# Patient Record
Sex: FEMALE | Race: WHITE | NOT HISPANIC OR LATINO | Employment: FULL TIME | URBAN - METROPOLITAN AREA
[De-identification: names, ages, dates, MRNs, and addresses within clinical notes are randomized per-mention and may not be internally consistent; named-entity substitution may affect disease eponyms.]

---

## 2017-05-12 ENCOUNTER — LAB (OUTPATIENT)
Dept: LAB | Facility: HOSPITAL | Age: 52
End: 2017-05-12

## 2017-05-12 ENCOUNTER — TELEPHONE (OUTPATIENT)
Dept: INTERNAL MEDICINE | Facility: CLINIC | Age: 52
End: 2017-05-12

## 2017-05-12 ENCOUNTER — OFFICE VISIT (OUTPATIENT)
Dept: INTERNAL MEDICINE | Facility: CLINIC | Age: 52
End: 2017-05-12

## 2017-05-12 VITALS
HEART RATE: 71 BPM | DIASTOLIC BLOOD PRESSURE: 82 MMHG | SYSTOLIC BLOOD PRESSURE: 114 MMHG | WEIGHT: 203 LBS | OXYGEN SATURATION: 98 % | TEMPERATURE: 98.1 F

## 2017-05-12 DIAGNOSIS — K52.9 GASTROENTERITIS: ICD-10-CM

## 2017-05-12 DIAGNOSIS — K62.5 RECTAL BLEEDING: ICD-10-CM

## 2017-05-12 DIAGNOSIS — K62.5 RECTAL BLEEDING: Primary | ICD-10-CM

## 2017-05-12 LAB
ALBUMIN SERPL-MCNC: 4.1 G/DL (ref 3.5–5.2)
ALBUMIN/GLOB SERPL: 1.2 G/DL
ALP SERPL-CCNC: 66 U/L (ref 39–117)
ALT SERPL W P-5'-P-CCNC: 35 U/L (ref 1–33)
ANION GAP SERPL CALCULATED.3IONS-SCNC: 12.6 MMOL/L
AST SERPL-CCNC: 34 U/L (ref 1–32)
BASOPHILS # BLD AUTO: 0.02 10*3/MM3 (ref 0–0.2)
BASOPHILS NFR BLD AUTO: 0.3 % (ref 0–1.5)
BILIRUB SERPL-MCNC: 0.3 MG/DL (ref 0.1–1.2)
BILIRUB UR QL STRIP: NEGATIVE
BUN BLD-MCNC: 18 MG/DL (ref 6–20)
BUN/CREAT SERPL: 26.1 (ref 7–25)
CALCIUM SPEC-SCNC: 9.2 MG/DL (ref 8.6–10.5)
CHLORIDE SERPL-SCNC: 105 MMOL/L (ref 98–107)
CLARITY UR: ABNORMAL
CO2 SERPL-SCNC: 25.4 MMOL/L (ref 22–29)
COLOR UR: YELLOW
CREAT BLD-MCNC: 0.69 MG/DL (ref 0.57–1)
DEPRECATED RDW RBC AUTO: 43.1 FL (ref 37–54)
EOSINOPHIL # BLD AUTO: 0.11 10*3/MM3 (ref 0–0.7)
EOSINOPHIL NFR BLD AUTO: 1.9 % (ref 0.3–6.2)
ERYTHROCYTE [DISTWIDTH] IN BLOOD BY AUTOMATED COUNT: 12.6 % (ref 11.7–13)
GFR SERPL CREATININE-BSD FRML MDRD: 89 ML/MIN/1.73
GLOBULIN UR ELPH-MCNC: 3.3 GM/DL
GLUCOSE BLD-MCNC: 97 MG/DL (ref 65–99)
GLUCOSE UR STRIP-MCNC: NEGATIVE MG/DL
HCT VFR BLD AUTO: 42.4 % (ref 35.6–45.5)
HEMOCCULT STL QL IA: POSITIVE
HGB BLD-MCNC: 14.5 G/DL (ref 11.9–15.5)
HGB UR QL STRIP.AUTO: NEGATIVE
IMM GRANULOCYTES # BLD: 0 10*3/MM3 (ref 0–0.03)
IMM GRANULOCYTES NFR BLD: 0 % (ref 0–0.5)
KETONES UR QL STRIP: NEGATIVE
LEUKOCYTE ESTERASE UR QL STRIP.AUTO: NEGATIVE
LYMPHOCYTES # BLD AUTO: 1.88 10*3/MM3 (ref 0.9–4.8)
LYMPHOCYTES NFR BLD AUTO: 32 % (ref 19.6–45.3)
MCH RBC QN AUTO: 32.2 PG (ref 26.9–32)
MCHC RBC AUTO-ENTMCNC: 34.2 G/DL (ref 32.4–36.3)
MCV RBC AUTO: 94.2 FL (ref 80.5–98.2)
MONOCYTES # BLD AUTO: 0.53 10*3/MM3 (ref 0.2–1.2)
MONOCYTES NFR BLD AUTO: 9 % (ref 5–12)
NEUTROPHILS # BLD AUTO: 3.33 10*3/MM3 (ref 1.9–8.1)
NEUTROPHILS NFR BLD AUTO: 56.8 % (ref 42.7–76)
NITRITE UR QL STRIP: NEGATIVE
PH UR STRIP.AUTO: 6 [PH] (ref 5–8)
PLATELET # BLD AUTO: 265 10*3/MM3 (ref 140–500)
PMV BLD AUTO: 10.7 FL (ref 6–12)
POTASSIUM BLD-SCNC: 4.3 MMOL/L (ref 3.5–5.2)
PROT SERPL-MCNC: 7.4 G/DL (ref 6–8.5)
PROT UR QL STRIP: NEGATIVE
RBC # BLD AUTO: 4.5 10*6/MM3 (ref 3.9–5.2)
SODIUM BLD-SCNC: 143 MMOL/L (ref 136–145)
SP GR UR STRIP: 1.02 (ref 1–1.03)
UROBILINOGEN UR QL STRIP: ABNORMAL
WBC NRBC COR # BLD: 5.87 10*3/MM3 (ref 4.5–10.7)

## 2017-05-12 PROCEDURE — 36415 COLL VENOUS BLD VENIPUNCTURE: CPT

## 2017-05-12 PROCEDURE — 99213 OFFICE O/P EST LOW 20 MIN: CPT | Performed by: NURSE PRACTITIONER

## 2017-05-12 PROCEDURE — 85025 COMPLETE CBC W/AUTO DIFF WBC: CPT

## 2017-05-12 PROCEDURE — 80053 COMPREHEN METABOLIC PANEL: CPT

## 2017-05-12 PROCEDURE — 81003 URINALYSIS AUTO W/O SCOPE: CPT

## 2017-05-12 PROCEDURE — 82274 ASSAY TEST FOR BLOOD FECAL: CPT | Performed by: NURSE PRACTITIONER

## 2017-05-12 RX ORDER — CEFDINIR 300 MG/1
300 CAPSULE ORAL 2 TIMES DAILY
Qty: 14 CAPSULE | Refills: 0 | Status: SHIPPED | OUTPATIENT
Start: 2017-05-12 | End: 2017-05-19

## 2017-05-12 RX ORDER — METRONIDAZOLE 500 MG/1
500 TABLET ORAL 2 TIMES DAILY
Qty: 14 TABLET | Refills: 0 | Status: SHIPPED | OUTPATIENT
Start: 2017-05-12 | End: 2017-05-19

## 2017-05-12 RX ORDER — ESTRADIOL 0.5 MG/1
0.5 TABLET ORAL DAILY
COMMUNITY
End: 2018-11-14 | Stop reason: SDUPTHER

## 2017-05-15 ENCOUNTER — OFFICE VISIT (OUTPATIENT)
Dept: INTERNAL MEDICINE | Facility: CLINIC | Age: 52
End: 2017-05-15

## 2017-05-15 VITALS — SYSTOLIC BLOOD PRESSURE: 114 MMHG | TEMPERATURE: 98.1 F | DIASTOLIC BLOOD PRESSURE: 82 MMHG | WEIGHT: 203 LBS

## 2017-05-15 DIAGNOSIS — R10.32 LEFT LOWER QUADRANT PAIN: ICD-10-CM

## 2017-05-15 DIAGNOSIS — K62.5 RECTAL BLEEDING: Primary | ICD-10-CM

## 2017-05-15 DIAGNOSIS — K52.9 GASTROENTERITIS: ICD-10-CM

## 2017-05-15 DIAGNOSIS — R11.0 NAUSEA: ICD-10-CM

## 2017-05-15 LAB — HEMOCCULT STL QL IA: POSITIVE

## 2017-05-15 PROCEDURE — 99213 OFFICE O/P EST LOW 20 MIN: CPT | Performed by: NURSE PRACTITIONER

## 2017-05-15 PROCEDURE — 82274 ASSAY TEST FOR BLOOD FECAL: CPT | Performed by: NURSE PRACTITIONER

## 2017-05-15 RX ORDER — ONDANSETRON 4 MG/1
4 TABLET, FILM COATED ORAL EVERY 8 HOURS PRN
Qty: 15 TABLET | Refills: 0 | Status: SHIPPED | OUTPATIENT
Start: 2017-05-15 | End: 2018-09-19

## 2017-09-19 ENCOUNTER — OFFICE VISIT (OUTPATIENT)
Dept: INTERNAL MEDICINE | Facility: CLINIC | Age: 52
End: 2017-09-19

## 2017-09-19 VITALS
HEIGHT: 65 IN | BODY MASS INDEX: 33.66 KG/M2 | WEIGHT: 202 LBS | SYSTOLIC BLOOD PRESSURE: 128 MMHG | DIASTOLIC BLOOD PRESSURE: 80 MMHG

## 2017-09-19 DIAGNOSIS — Z76.89 SLEEP CONCERN: ICD-10-CM

## 2017-09-19 DIAGNOSIS — G40.802 OTHER EPILEPSY WITHOUT STATUS EPILEPTICUS, NOT INTRACTABLE (HCC): Primary | ICD-10-CM

## 2017-09-19 LAB
ALBUMIN SERPL-MCNC: 4.7 G/DL (ref 3.5–5.2)
ALBUMIN/GLOB SERPL: 2 G/DL
ALP SERPL-CCNC: 63 U/L (ref 39–117)
ALT SERPL W P-5'-P-CCNC: 46 U/L (ref 1–33)
ANION GAP SERPL CALCULATED.3IONS-SCNC: 13.1 MMOL/L
AST SERPL-CCNC: 39 U/L (ref 1–32)
BASOPHILS # BLD AUTO: 0.03 10*3/MM3 (ref 0–0.2)
BASOPHILS NFR BLD AUTO: 0.4 % (ref 0–2)
BILIRUB SERPL-MCNC: 0.4 MG/DL (ref 0.1–1.2)
BUN BLD-MCNC: 18 MG/DL (ref 6–20)
BUN/CREAT SERPL: 24.7 (ref 7–25)
CALCIUM SPEC-SCNC: 9.5 MG/DL (ref 8.6–10.5)
CHLORIDE SERPL-SCNC: 102 MMOL/L (ref 98–107)
CO2 SERPL-SCNC: 24.9 MMOL/L (ref 22–29)
CREAT BLD-MCNC: 0.73 MG/DL (ref 0.57–1)
DEPRECATED RDW RBC AUTO: 40.1 FL (ref 37–54)
EOSINOPHIL # BLD AUTO: 0.12 10*3/MM3 (ref 0–0.7)
EOSINOPHIL NFR BLD AUTO: 1.8 % (ref 0–5)
ERYTHROCYTE [DISTWIDTH] IN BLOOD BY AUTOMATED COUNT: 12.1 % (ref 11.5–15)
GFR SERPL CREATININE-BSD FRML MDRD: 83 ML/MIN/1.73
GLOBULIN UR ELPH-MCNC: 2.4 GM/DL
GLUCOSE BLD-MCNC: 104 MG/DL (ref 65–99)
HCT VFR BLD AUTO: 42.7 % (ref 34.1–44.9)
HGB BLD-MCNC: 14.7 G/DL (ref 11.2–15.7)
LYMPHOCYTES # BLD AUTO: 1.85 10*3/MM3 (ref 0.8–7)
LYMPHOCYTES NFR BLD AUTO: 27.5 % (ref 10–60)
MCH RBC QN AUTO: 31.7 PG (ref 26–34)
MCHC RBC AUTO-ENTMCNC: 34.4 G/DL (ref 31–37)
MCV RBC AUTO: 92.2 FL (ref 80–100)
MONOCYTES # BLD AUTO: 0.75 10*3/MM3 (ref 0–1)
MONOCYTES NFR BLD AUTO: 11.1 % (ref 0–13)
NEUTROPHILS # BLD AUTO: 3.98 10*3/MM3 (ref 1–11)
NEUTROPHILS NFR BLD AUTO: 59.2 % (ref 30–85)
PLATELET # BLD AUTO: 244 10*3/MM3 (ref 150–450)
PMV BLD AUTO: 10.3 FL (ref 6–12)
POTASSIUM BLD-SCNC: 4.1 MMOL/L (ref 3.5–5.2)
PROT SERPL-MCNC: 7.1 G/DL (ref 6–8.5)
RBC # BLD AUTO: 4.63 10*6/MM3 (ref 3.93–5.22)
SODIUM BLD-SCNC: 140 MMOL/L (ref 136–145)
TSH SERPL DL<=0.05 MIU/L-ACNC: 3.02 MIU/ML (ref 0.27–4.2)
WBC NRBC COR # BLD: 6.73 10*3/MM3 (ref 5–10)

## 2017-09-19 PROCEDURE — 36415 COLL VENOUS BLD VENIPUNCTURE: CPT | Performed by: INTERNAL MEDICINE

## 2017-09-19 PROCEDURE — 84443 ASSAY THYROID STIM HORMONE: CPT | Performed by: INTERNAL MEDICINE

## 2017-09-19 PROCEDURE — 80053 COMPREHEN METABOLIC PANEL: CPT | Performed by: INTERNAL MEDICINE

## 2017-09-19 PROCEDURE — 85025 COMPLETE CBC W/AUTO DIFF WBC: CPT | Performed by: INTERNAL MEDICINE

## 2017-09-19 PROCEDURE — 99214 OFFICE O/P EST MOD 30 MIN: CPT | Performed by: INTERNAL MEDICINE

## 2017-09-19 RX ORDER — GABAPENTIN 300 MG/1
300 CAPSULE ORAL 2 TIMES DAILY
Qty: 60 CAPSULE | Refills: 5 | Status: SHIPPED | OUTPATIENT
Start: 2017-09-19 | End: 2018-03-19 | Stop reason: SDUPTHER

## 2017-09-19 NOTE — PROGRESS NOTES
Chief Complaint   Patient presents with   • Seizures     been having episodes of falls and seizures while sleeping, some confusion        Subjective   Imani Mazariegos is a 52 y.o. female     HPI: She comes in for evaluation of seizures, episodes of falling.    In 1999, she had an episode of garbled speech.  Her evaluation at that time raised the possibility of a parietal lobe seizure.  She was treated with Lyrica then gabapentin.  She stopped taking gabapentin about 3 years ago.    In the last couple of years, she has had episodes of falling down.  After the event in 1999, she says that her right leg just has not been the same as her left.  Sometimes it drags.  She thinks that this is why she falls.  One fall recently caused her to injure her right shoulder.  She says that she cracked her clavicle.  She states that she can fall just walking across the room.  While she is asleep, she has body jerks.  She says it feels like she's having a seizure.  She had 2 episodes last night.  She feels bad today.  At times, she says that she has periods of confusion.  Sometimes she forgets where she is going while driving.  She has to pull over, inspect her surroundings, look at buildings and she is able to recall where she is and where she is going.  She has not had loss of consciousness at all.  Her symptoms are worse if she is fatigued.  She believes they were controlled while she took gabapentin.        The following portions of the patient's history were reviewed and updated as appropriate: allergies, current medications, past social history, problem list, past surgical history    Review of Systems   Constitutional: Negative for appetite change and fever.   HENT: Positive for trouble swallowing (she sometimes feels as if food sticks in her mid chest.). Negative for sinus pressure and sore throat.    Eyes: Negative for visual disturbance.   Respiratory: Positive for shortness of breath (sometimes, with stairs.). Negative for  "cough and wheezing.    Cardiovascular: Positive for palpitations (at night). Negative for chest pain and leg swelling.   Gastrointestinal: Negative for diarrhea, nausea and vomiting.   Endocrine: Negative for cold intolerance and heat intolerance.   Neurological: Negative for dizziness, syncope and headaches.           Objective     /80  Ht 65\" (165.1 cm)  Wt 202 lb (91.6 kg)  BMI 33.61 kg/m2     Physical Exam   Constitutional: She is oriented to person, place, and time. She appears well-developed and well-nourished. No distress.   Neck: Normal carotid pulses present. Carotid bruit is not present.   Cardiovascular: Normal rate, regular rhythm, S1 normal, S2 normal and intact distal pulses.  Exam reveals no gallop and no friction rub.    No murmur heard.  Pulses:       Carotid pulses are 2+ on the right side, and 2+ on the left side.  Pulmonary/Chest: Effort normal and breath sounds normal. No respiratory distress. She has no wheezes. She has no rhonchi. She has no rales. Chest wall is not dull to percussion.   Musculoskeletal: She exhibits no edema.   Neurological: She is alert and oriented to person, place, and time. She has normal strength. No cranial nerve deficit or sensory deficit. She displays a negative Romberg sign. Coordination normal.   Reflex Scores:       Patellar reflexes are 1+ on the right side and 1+ on the left side.  She finds it difficult to walk on her toes.   Skin: Skin is warm and dry.   Nursing note and vitals reviewed.      Assessment/Plan   Problem List Items Addressed This Visit        Nervous and Auditory    Epilepsy without status epilepticus, not intractable - Primary    Relevant Medications    gabapentin (NEURONTIN) 300 MG capsule    Other Relevant Orders    MRI Brain With & Without Contrast    EEG    Comprehensive Metabolic Panel (Completed)    CBC & Differential (Completed)    TSH (Completed)    CBC Auto Differential (Completed)      Other Visit Diagnoses     Sleep concern    "     Relevant Orders    Ambulatory Referral to Sleep Medicine      She will resume gabapentin.  Controlled substance agreement form signed.

## 2017-10-12 ENCOUNTER — APPOINTMENT (OUTPATIENT)
Dept: WOMENS IMAGING | Facility: HOSPITAL | Age: 52
End: 2017-10-12

## 2017-10-12 PROCEDURE — G0202 SCR MAMMO BI INCL CAD: HCPCS | Performed by: RADIOLOGY

## 2017-10-12 PROCEDURE — 77063 BREAST TOMOSYNTHESIS BI: CPT | Performed by: RADIOLOGY

## 2017-10-12 PROCEDURE — 77067 SCR MAMMO BI INCL CAD: CPT | Performed by: RADIOLOGY

## 2017-10-16 ENCOUNTER — APPOINTMENT (OUTPATIENT)
Dept: MRI IMAGING | Facility: HOSPITAL | Age: 52
End: 2017-10-16
Attending: INTERNAL MEDICINE

## 2017-10-16 ENCOUNTER — APPOINTMENT (OUTPATIENT)
Dept: NEUROLOGY | Facility: HOSPITAL | Age: 52
End: 2017-10-16
Attending: INTERNAL MEDICINE

## 2017-11-03 ENCOUNTER — TELEPHONE (OUTPATIENT)
Dept: INTERNAL MEDICINE | Facility: CLINIC | Age: 52
End: 2017-11-03

## 2017-11-03 NOTE — TELEPHONE ENCOUNTER
----- Message from Rufina Mcnamara MA sent at 11/3/2017 10:03 AM EDT -----  Pt calling for Valium to have for MRI 11/11    [facility] [address] # [phone]

## 2017-11-13 ENCOUNTER — APPOINTMENT (OUTPATIENT)
Dept: MRI IMAGING | Facility: HOSPITAL | Age: 52
End: 2017-11-13
Attending: INTERNAL MEDICINE

## 2017-11-13 ENCOUNTER — APPOINTMENT (OUTPATIENT)
Dept: NEUROLOGY | Facility: HOSPITAL | Age: 52
End: 2017-11-13
Attending: INTERNAL MEDICINE

## 2017-11-29 ENCOUNTER — APPOINTMENT (OUTPATIENT)
Dept: SLEEP MEDICINE | Facility: HOSPITAL | Age: 52
End: 2017-11-29
Attending: INTERNAL MEDICINE

## 2018-03-13 RX ORDER — GABAPENTIN 300 MG/1
CAPSULE ORAL
Qty: 60 CAPSULE | OUTPATIENT
Start: 2018-03-13

## 2018-03-19 ENCOUNTER — OFFICE VISIT (OUTPATIENT)
Dept: INTERNAL MEDICINE | Facility: CLINIC | Age: 53
End: 2018-03-19

## 2018-03-19 VITALS
HEIGHT: 65 IN | SYSTOLIC BLOOD PRESSURE: 116 MMHG | WEIGHT: 191 LBS | DIASTOLIC BLOOD PRESSURE: 64 MMHG | BODY MASS INDEX: 31.82 KG/M2

## 2018-03-19 DIAGNOSIS — E78.5 HYPERLIPIDEMIA, UNSPECIFIED HYPERLIPIDEMIA TYPE: Primary | ICD-10-CM

## 2018-03-19 PROCEDURE — 99212 OFFICE O/P EST SF 10 MIN: CPT | Performed by: INTERNAL MEDICINE

## 2018-03-19 RX ORDER — GABAPENTIN 300 MG/1
300 CAPSULE ORAL 2 TIMES DAILY
Qty: 60 CAPSULE | Refills: 5 | Status: SHIPPED | OUTPATIENT
Start: 2018-03-19 | End: 2018-09-09 | Stop reason: SDUPTHER

## 2018-03-19 RX ORDER — GABAPENTIN 300 MG/1
300 CAPSULE ORAL 2 TIMES DAILY
Qty: 60 CAPSULE | Refills: 5 | Status: SHIPPED | OUTPATIENT
Start: 2018-03-19 | End: 2018-03-19 | Stop reason: SDUPTHER

## 2018-03-19 NOTE — PROGRESS NOTES
"Chief Complaint   Patient presents with   • Follow-up     follow up from sleep study    • Hyperlipidemia       Subjective   Imani Mazariegos is a 52 y.o. female.     She has had sleep study.  No MCKENZIE.  Weight loss recommended. Heartrate dropped during the test.  No drop in oxygen sats. Limb movement was noted.       Hyperlipidemia   Pertinent negatives include no chest pain or shortness of breath.    Hyperlipidemia:     Her most recent lipid panel was done on 10/22/2010.    Total cholesterol was 197, Triglycerides 156, HDL 45, .   Current treatment: Dietary modification and exericise.    She is following Weight Watchers. She has lost 13 lbs.              The following portions of the patient's history were reviewed and updated as appropriate: allergies, current medications, past family history, past medical history, past social history, past surgical history and problem list.    Review of Systems   Constitutional: Negative for appetite change.   HENT: Negative for nosebleeds.    Eyes: Negative for blurred vision and double vision.   Respiratory: Negative for cough and shortness of breath.    Cardiovascular: Negative for chest pain, palpitations and leg swelling.   Neurological: Negative for headaches.         Current Outpatient Prescriptions:   •  conjugated estrogens (PREMARIN) 0.625 MG/GM vaginal cream, Insert 1 g into the vagina Daily., Disp: , Rfl:   •  estradiol (ESTRACE) 0.5 MG tablet, Take 0.5 mg by mouth Daily., Disp: , Rfl:   •  gabapentin (NEURONTIN) 300 MG capsule, Take 1 capsule by mouth 2 (Two) Times a Day., Disp: 60 capsule, Rfl: 5  •  ondansetron (ZOFRAN) 4 MG tablet, Take 1 tablet by mouth Every 8 (Eight) Hours As Needed for Nausea or Vomiting., Disp: 15 tablet, Rfl: 0        Objective     Vitals:    03/19/18 1456   BP: 116/64   Weight: 86.6 kg (191 lb)   Height: 165.1 cm (65\")       Physical Exam   Constitutional: She is oriented to person, place, and time. She appears well-developed and " well-nourished. No distress.   Neck: Normal carotid pulses present. Carotid bruit is not present.   Cardiovascular: Regular rhythm, S1 normal and S2 normal.  Exam reveals no gallop and no friction rub.    No murmur heard.  Pulses:       Carotid pulses are 2+ on the right side, and 2+ on the left side.  Pulmonary/Chest: Effort normal and breath sounds normal. She has no wheezes. She has no rhonchi. She has no rales. Chest wall is not dull to percussion.   Musculoskeletal: She exhibits no edema.   Neurological: She is alert and oriented to person, place, and time.   Skin: Skin is warm and dry.         Assessment/Plan   Imani was seen today for follow-up and hyperlipidemia.    Diagnoses and all orders for this visit:    Hyperlipidemia, unspecified hyperlipidemia type    Other orders  -     Discontinue: gabapentin (NEURONTIN) 300 MG capsule; Take 1 capsule by mouth 2 (Two) Times a Day.  -     gabapentin (NEURONTIN) 300 MG capsule; Take 1 capsule by mouth 2 (Two) Times a Day.      She will continue prudent diet, regular exercise.

## 2018-07-30 ENCOUNTER — TELEPHONE (OUTPATIENT)
Dept: NEUROSURGERY | Facility: CLINIC | Age: 53
End: 2018-07-30

## 2018-07-30 NOTE — TELEPHONE ENCOUNTER
Called patient and asked her if she had received her new patient packet and she has. I reminded her that we need that returned within 3 business days of her appointment She verbalized understanding and will be putting it back in the mail to return to us as soon as possible. I did remind her that if we do not receive it we will have to cancel or reschedule her appointment.

## 2018-09-11 RX ORDER — GABAPENTIN 300 MG/1
CAPSULE ORAL
Qty: 60 CAPSULE | Refills: 1 | OUTPATIENT
Start: 2018-09-11 | End: 2018-11-05 | Stop reason: SDUPTHER

## 2018-09-19 ENCOUNTER — OFFICE VISIT (OUTPATIENT)
Dept: INTERNAL MEDICINE | Facility: CLINIC | Age: 53
End: 2018-09-19

## 2018-09-19 VITALS
WEIGHT: 181 LBS | SYSTOLIC BLOOD PRESSURE: 106 MMHG | DIASTOLIC BLOOD PRESSURE: 64 MMHG | HEIGHT: 65 IN | BODY MASS INDEX: 30.16 KG/M2

## 2018-09-19 DIAGNOSIS — Z11.59 SCREENING FOR VIRAL DISEASE: ICD-10-CM

## 2018-09-19 DIAGNOSIS — Z23 NEED FOR INFLUENZA VACCINATION: ICD-10-CM

## 2018-09-19 DIAGNOSIS — E78.5 HYPERLIPIDEMIA, UNSPECIFIED HYPERLIPIDEMIA TYPE: Primary | ICD-10-CM

## 2018-09-19 LAB
CHOLEST SERPL-MCNC: 207 MG/DL (ref 0–200)
HCV AB SER DONR QL: NORMAL
HDLC SERPL-MCNC: 57 MG/DL (ref 40–60)
LDLC SERPL CALC-MCNC: 131 MG/DL (ref 0–100)
LDLC/HDLC SERPL: 2.29 {RATIO}
TRIGL SERPL-MCNC: 97 MG/DL (ref 0–150)
VLDLC SERPL-MCNC: 19.4 MG/DL (ref 5–40)

## 2018-09-19 PROCEDURE — 80061 LIPID PANEL: CPT | Performed by: INTERNAL MEDICINE

## 2018-09-19 PROCEDURE — 99212 OFFICE O/P EST SF 10 MIN: CPT | Performed by: INTERNAL MEDICINE

## 2018-09-19 PROCEDURE — 90674 CCIIV4 VAC NO PRSV 0.5 ML IM: CPT | Performed by: INTERNAL MEDICINE

## 2018-09-19 PROCEDURE — G0008 ADMIN INFLUENZA VIRUS VAC: HCPCS | Performed by: INTERNAL MEDICINE

## 2018-09-19 PROCEDURE — 86803 HEPATITIS C AB TEST: CPT | Performed by: INTERNAL MEDICINE

## 2018-09-19 PROCEDURE — 36415 COLL VENOUS BLD VENIPUNCTURE: CPT | Performed by: INTERNAL MEDICINE

## 2018-09-19 NOTE — PROGRESS NOTES
"Chief Complaint   Patient presents with   • Hyperlipidemia     6 month follow up       Subjective   Imani Mazariegos is a 53 y.o. female.     Hyperlipidemia   This is a chronic problem. She has no history of diabetes or hypothyroidism. Factors aggravating her hyperlipidemia include estrogen. Pertinent negatives include no chest pain, leg pain, myalgias or shortness of breath. Current antihyperlipidemic treatment includes diet change and exercise. There are no compliance problems.     She is following Weight Watchers and has lost a significant amount of weight.    The following portions of the patient's history were reviewed and updated as appropriate: allergies, current medications, past family history, past medical history, past social history, past surgical history and problem list.    Review of Systems   Constitutional: Negative for appetite change.   HENT: Negative for nosebleeds.    Eyes: Negative for blurred vision and double vision.   Respiratory: Negative for cough and shortness of breath.    Cardiovascular: Negative for chest pain, palpitations and leg swelling.   Musculoskeletal: Negative for myalgias.         Current Outpatient Prescriptions:   •  conjugated estrogens (PREMARIN) 0.625 MG/GM vaginal cream, Insert 1 g into the vagina Daily., Disp: , Rfl:   •  estradiol (ESTRACE) 0.5 MG tablet, Take 0.5 mg by mouth Daily., Disp: , Rfl:   •  gabapentin (NEURONTIN) 300 MG capsule, TAKE ONE CAPSULE BY MOUTH TWICE A DAY, Disp: 60 capsule, Rfl: 1        Objective     /64   Ht 165.1 cm (65\")   Wt 82.1 kg (181 lb)   BMI 30.12 kg/m²     Physical Exam   Constitutional: She is oriented to person, place, and time. She appears well-developed and well-nourished. No distress.   Neck: Normal carotid pulses present. Carotid bruit is not present.   Cardiovascular: Regular rhythm, S1 normal and S2 normal.  Exam reveals no gallop and no friction rub.    No murmur heard.  Pulses:       Carotid pulses are 2+ on the right " side, and 2+ on the left side.  Pulmonary/Chest: Effort normal and breath sounds normal. She has no wheezes. She has no rhonchi. She has no rales. Chest wall is not dull to percussion.   Musculoskeletal: She exhibits no edema.   Neurological: She is alert and oriented to person, place, and time.   Skin: Skin is warm and dry.   Nursing note and vitals reviewed.        Assessment/Plan   Imani was seen today for hyperlipidemia.    Diagnoses and all orders for this visit:    Hyperlipidemia, unspecified hyperlipidemia type  -     Lipid Panel; Future  -     Lipid Panel    Need for influenza vaccination  -     Flucelvax Quad=>4Years (PFS)    Screening for viral disease  -     Hepatitis C Antibody; Future  -     Hepatitis C Antibody      She will check with her pharmacy about the new shingles vaccine.  She believes she is up-to-date on tetanus vaccine and will check.

## 2018-10-15 ENCOUNTER — APPOINTMENT (OUTPATIENT)
Dept: WOMENS IMAGING | Facility: HOSPITAL | Age: 53
End: 2018-10-15

## 2018-10-15 PROCEDURE — 77063 BREAST TOMOSYNTHESIS BI: CPT | Performed by: RADIOLOGY

## 2018-10-15 PROCEDURE — 77067 SCR MAMMO BI INCL CAD: CPT | Performed by: RADIOLOGY

## 2018-11-05 RX ORDER — GABAPENTIN 300 MG/1
300 CAPSULE ORAL 2 TIMES DAILY
Qty: 60 CAPSULE | Refills: 3 | OUTPATIENT
Start: 2018-11-05 | End: 2018-11-09 | Stop reason: SDUPTHER

## 2018-11-09 RX ORDER — GABAPENTIN 300 MG/1
CAPSULE ORAL
Qty: 60 CAPSULE | Refills: 1 | OUTPATIENT
Start: 2018-11-09 | End: 2019-01-02 | Stop reason: SDUPTHER

## 2018-11-12 RX ORDER — GABAPENTIN 300 MG/1
CAPSULE ORAL
Qty: 60 CAPSULE | Refills: 1 | OUTPATIENT
Start: 2018-11-12

## 2018-11-15 RX ORDER — ESTRADIOL 0.5 MG/1
TABLET ORAL
Qty: 30 TABLET | Refills: 0 | Status: SHIPPED | OUTPATIENT
Start: 2018-11-15 | End: 2019-01-03 | Stop reason: SDUPTHER

## 2019-01-02 RX ORDER — ESTRADIOL 0.5 MG/1
TABLET ORAL
Qty: 30 TABLET | Refills: 0 | Status: CANCELLED | OUTPATIENT
Start: 2019-01-02

## 2019-01-03 ENCOUNTER — TELEPHONE (OUTPATIENT)
Dept: INTERNAL MEDICINE | Facility: CLINIC | Age: 54
End: 2019-01-03

## 2019-01-03 RX ORDER — GABAPENTIN 300 MG/1
300 CAPSULE ORAL 2 TIMES DAILY
Qty: 60 CAPSULE | Refills: 1 | OUTPATIENT
Start: 2019-01-03 | End: 2019-03-26 | Stop reason: SDUPTHER

## 2019-01-03 RX ORDER — ESTRADIOL 0.5 MG/1
0.5 TABLET ORAL DAILY
Qty: 90 TABLET | Refills: 0 | Status: SHIPPED | OUTPATIENT
Start: 2019-01-03 | End: 2019-03-26 | Stop reason: SDUPTHER

## 2019-01-03 NOTE — TELEPHONE ENCOUNTER
Regarding: Prescription Question  ----- Message from [email or web address], Generic sent at 1/2/2019  7:34 PM EST -----    According to pharmacy I have no refills on my Estrace because I need to schedule yearly exam. The appt for my exam is in March. Please refill 90 days refill. I will be at my appt in March.  Please call me with questions   Thank you

## 2019-01-08 ENCOUNTER — TELEPHONE (OUTPATIENT)
Dept: INTERNAL MEDICINE | Facility: CLINIC | Age: 54
End: 2019-01-08

## 2019-01-08 NOTE — TELEPHONE ENCOUNTER
----- Message from Linda Gee sent at 1/8/2019 12:36 PM EST -----  Contact: Pharmacy  [facility] calling states the prescription for gabapentin (NEURONTIN) 300 MG capsule got called into the wrong pharmacy and they cant transfer it because its a controlled. Please advise    Pharmacy:[facility]/pharmacy #[id number] - [place], [place] - [address] - [phone]  - [phone] FX

## 2019-03-26 ENCOUNTER — OFFICE VISIT (OUTPATIENT)
Dept: INTERNAL MEDICINE | Facility: CLINIC | Age: 54
End: 2019-03-26

## 2019-03-26 VITALS
SYSTOLIC BLOOD PRESSURE: 112 MMHG | HEIGHT: 65 IN | DIASTOLIC BLOOD PRESSURE: 64 MMHG | BODY MASS INDEX: 31.82 KG/M2 | WEIGHT: 191 LBS

## 2019-03-26 DIAGNOSIS — Z12.72 VAGINAL PAP SMEAR: ICD-10-CM

## 2019-03-26 DIAGNOSIS — Z00.00 ANNUAL PHYSICAL EXAM: Primary | ICD-10-CM

## 2019-03-26 DIAGNOSIS — Z12.11 ENCOUNTER FOR HEMOCCULT SCREENING: ICD-10-CM

## 2019-03-26 DIAGNOSIS — Z23 NEED FOR TDAP VACCINATION: ICD-10-CM

## 2019-03-26 LAB
ALBUMIN SERPL-MCNC: 4.4 G/DL (ref 3.5–5.2)
ALBUMIN/GLOB SERPL: 1.6 G/DL
ALP SERPL-CCNC: 61 U/L (ref 39–117)
ALT SERPL W P-5'-P-CCNC: 24 U/L (ref 1–33)
ANION GAP SERPL CALCULATED.3IONS-SCNC: 10.5 MMOL/L
AST SERPL-CCNC: 21 U/L (ref 1–32)
BASOPHILS # BLD AUTO: 0.04 10*3/MM3 (ref 0–0.2)
BASOPHILS NFR BLD AUTO: 0.7 % (ref 0–1.5)
BILIRUB SERPL-MCNC: 0.4 MG/DL (ref 0.2–1.2)
BILIRUB UR QL STRIP: NEGATIVE
BUN BLD-MCNC: 20 MG/DL (ref 6–20)
BUN/CREAT SERPL: 37.7 (ref 7–25)
CALCIUM SPEC-SCNC: 9.6 MG/DL (ref 8.6–10.5)
CHLORIDE SERPL-SCNC: 105 MMOL/L (ref 98–107)
CHOLEST SERPL-MCNC: 195 MG/DL (ref 0–200)
CLARITY UR: CLEAR
CO2 SERPL-SCNC: 24.5 MMOL/L (ref 22–29)
COLOR UR: YELLOW
CREAT BLD-MCNC: 0.53 MG/DL (ref 0.57–1)
DEPRECATED RDW RBC AUTO: 42 FL (ref 37–54)
EOSINOPHIL # BLD AUTO: 0.09 10*3/MM3 (ref 0–0.4)
EOSINOPHIL NFR BLD AUTO: 1.6 % (ref 0.3–6.2)
ERYTHROCYTE [DISTWIDTH] IN BLOOD BY AUTOMATED COUNT: 12.6 % (ref 12.3–15.4)
GFR SERPL CREATININE-BSD FRML MDRD: 120 ML/MIN/1.73
GLOBULIN UR ELPH-MCNC: 2.7 GM/DL
GLUCOSE BLD-MCNC: 100 MG/DL (ref 65–99)
GLUCOSE UR STRIP-MCNC: NEGATIVE MG/DL
HCT VFR BLD AUTO: 43.7 % (ref 34–46.6)
HDLC SERPL-MCNC: 62 MG/DL (ref 40–60)
HEMOCCULT STL QL IA: NEGATIVE
HGB BLD-MCNC: 14.7 G/DL (ref 12–15.9)
HGB UR QL STRIP.AUTO: NEGATIVE
KETONES UR QL STRIP: NEGATIVE
LDLC SERPL CALC-MCNC: 114 MG/DL (ref 0–100)
LDLC/HDLC SERPL: 1.83 {RATIO}
LEUKOCYTE ESTERASE UR QL STRIP.AUTO: NEGATIVE
LYMPHOCYTES # BLD AUTO: 1.74 10*3/MM3 (ref 0.7–3.1)
LYMPHOCYTES NFR BLD AUTO: 30.6 % (ref 19.6–45.3)
MCH RBC QN AUTO: 31.3 PG (ref 26.6–33)
MCHC RBC AUTO-ENTMCNC: 33.6 G/DL (ref 31.5–35.7)
MCV RBC AUTO: 93 FL (ref 79–97)
MONOCYTES # BLD AUTO: 0.56 10*3/MM3 (ref 0.1–0.9)
MONOCYTES NFR BLD AUTO: 9.9 % (ref 5–12)
NEUTROPHILS # BLD AUTO: 3.25 10*3/MM3 (ref 1.4–7)
NEUTROPHILS NFR BLD AUTO: 57.2 % (ref 42.7–76)
NITRITE UR QL STRIP: NEGATIVE
PH UR STRIP.AUTO: 6 [PH] (ref 5–8)
PLATELET # BLD AUTO: 256 10*3/MM3 (ref 140–450)
PMV BLD AUTO: 10.4 FL (ref 6–12)
POTASSIUM BLD-SCNC: 4.9 MMOL/L (ref 3.5–5.2)
PROT SERPL-MCNC: 7.1 G/DL (ref 6–8.5)
PROT UR QL STRIP: NEGATIVE
RBC # BLD AUTO: 4.7 10*6/MM3 (ref 3.77–5.28)
SODIUM BLD-SCNC: 140 MMOL/L (ref 136–145)
SP GR UR STRIP: 1.02 (ref 1–1.03)
TRIGL SERPL-MCNC: 97 MG/DL (ref 0–150)
TSH SERPL DL<=0.05 MIU/L-ACNC: 2.33 MIU/ML (ref 0.27–4.2)
UROBILINOGEN UR QL STRIP: NORMAL
VLDLC SERPL-MCNC: 19.4 MG/DL (ref 5–40)
WBC NRBC COR # BLD: 5.68 10*3/MM3 (ref 3.4–10.8)

## 2019-03-26 PROCEDURE — 36415 COLL VENOUS BLD VENIPUNCTURE: CPT | Performed by: INTERNAL MEDICINE

## 2019-03-26 PROCEDURE — 85025 COMPLETE CBC W/AUTO DIFF WBC: CPT | Performed by: INTERNAL MEDICINE

## 2019-03-26 PROCEDURE — 81003 URINALYSIS AUTO W/O SCOPE: CPT | Performed by: INTERNAL MEDICINE

## 2019-03-26 PROCEDURE — 90471 IMMUNIZATION ADMIN: CPT | Performed by: INTERNAL MEDICINE

## 2019-03-26 PROCEDURE — 84443 ASSAY THYROID STIM HORMONE: CPT | Performed by: INTERNAL MEDICINE

## 2019-03-26 PROCEDURE — 80061 LIPID PANEL: CPT | Performed by: INTERNAL MEDICINE

## 2019-03-26 PROCEDURE — 90715 TDAP VACCINE 7 YRS/> IM: CPT | Performed by: INTERNAL MEDICINE

## 2019-03-26 PROCEDURE — 93000 ELECTROCARDIOGRAM COMPLETE: CPT | Performed by: INTERNAL MEDICINE

## 2019-03-26 PROCEDURE — 82274 ASSAY TEST FOR BLOOD FECAL: CPT | Performed by: INTERNAL MEDICINE

## 2019-03-26 PROCEDURE — 80053 COMPREHEN METABOLIC PANEL: CPT | Performed by: INTERNAL MEDICINE

## 2019-03-26 PROCEDURE — 99396 PREV VISIT EST AGE 40-64: CPT | Performed by: INTERNAL MEDICINE

## 2019-03-26 RX ORDER — ESTRADIOL 0.5 MG/1
0.5 TABLET ORAL DAILY
Qty: 90 TABLET | Refills: 1 | Status: SHIPPED | OUTPATIENT
Start: 2019-03-26 | End: 2019-09-24 | Stop reason: SDUPTHER

## 2019-03-26 RX ORDER — GABAPENTIN 300 MG/1
300 CAPSULE ORAL 2 TIMES DAILY
Qty: 60 CAPSULE | Refills: 5 | Status: SHIPPED | OUTPATIENT
Start: 2019-03-26 | End: 2019-09-10 | Stop reason: SDUPTHER

## 2019-03-26 NOTE — PROGRESS NOTES
Chief Complaint   Patient presents with   • Annual Exam     physical with pap   • Gynecologic Exam       Subjective   Imani Mazariegos is a 53 y.o. female.     History of Present Illness     She comes in for her annual exam.  She generally feels healthy.  Her appetite is good.  She is trying to follow a prudent diet.  She was following weight watchers.  She lost some weight with that.  However, there has been some recent stress and she is no longer following weight watchers.  She is to walk regularly for exercise.  She has not been doing that recently.  She is sleeping okay.  Her energy is good.  Stress is related to her father developing dementia.  They have had to place him in assisted living.  Her father-in-law  recently.  She is up-to-date on dental exams and eye exams.  She is up-to-date on mammogram.        The following portions of the patient's history were reviewed and updated as appropriate: allergies, current medications, past family history, past medical history, past social history, past surgical history and problem list.    Review of Systems   Constitutional: Negative for appetite change, chills, fatigue, fever, unexpected weight gain and unexpected weight loss.   HENT: Negative for congestion, sinus pressure, sore throat and trouble swallowing.    Eyes: Negative for blurred vision and double vision.   Respiratory: Negative for cough and shortness of breath.    Cardiovascular: Negative for chest pain, palpitations and leg swelling.   Gastrointestinal: Negative for abdominal pain, constipation, diarrhea, nausea, vomiting and indigestion.   Endocrine: Negative for cold intolerance, heat intolerance, polydipsia and polyuria.   Genitourinary: Negative for breast discharge, dysuria, frequency and breast lump (Fibrocystic breast changes).   Musculoskeletal: Negative for arthralgias and back pain.   Skin: Negative for rash.   Neurological: Negative for dizziness and headache.   Hematological: Negative for  "adenopathy. Does not bruise/bleed easily.   Psychiatric/Behavioral: Negative for sleep disturbance and depressed mood. The patient is not nervous/anxious.          Current Outpatient Medications:   •  conjugated estrogens (PREMARIN) 0.625 MG/GM vaginal cream, Insert 0.5 mgm intravaginally twice a week, Disp: 30 g, Rfl: 5  •  estradiol (ESTRACE) 0.5 MG tablet, Take 1 tablet by mouth Daily., Disp: 90 tablet, Rfl: 1  •  gabapentin (NEURONTIN) 300 MG capsule, Take 1 capsule by mouth 2 (Two) Times a Day., Disp: 60 capsule, Rfl: 5        Objective     /64 (BP Location: Right arm, Patient Position: Sitting, Cuff Size: Adult)   Ht 165.1 cm (65\")   Wt 86.6 kg (191 lb)   BMI 31.78 kg/m²       Physical Exam   Constitutional: She is oriented to person, place, and time. She appears well-developed and well-nourished. No distress.   HENT:   Right Ear: Tympanic membrane and ear canal normal.   Left Ear: Tympanic membrane and ear canal normal.   Nose: Right sinus exhibits no maxillary sinus tenderness and no frontal sinus tenderness. Left sinus exhibits no maxillary sinus tenderness and no frontal sinus tenderness.   Mouth/Throat: Oropharynx is clear and moist.   Eyes: Conjunctivae and EOM are normal. Pupils are equal, round, and reactive to light. No scleral icterus.   Neck: Normal range of motion. Neck supple. Normal carotid pulses present. Carotid bruit is not present. No tracheal deviation present. No thyromegaly present.   Cardiovascular: Regular rhythm, S1 normal, S2 normal and intact distal pulses. Exam reveals no gallop and no friction rub.   No murmur heard.  Pulses:       Carotid pulses are 2+ on the right side, and 2+ on the left side.  Pulmonary/Chest: Effort normal and breath sounds normal. She has no wheezes. She has no rhonchi. She has no rales. Chest wall is not dull to percussion. Right breast exhibits no inverted nipple, no mass, no nipple discharge, no skin change and no tenderness. Left breast exhibits " no inverted nipple, no mass, no nipple discharge, no skin change and no tenderness.   Abdominal: Soft. Normal appearance and bowel sounds are normal. She exhibits no abdominal bruit. There is no hepatosplenomegaly. There is no tenderness. There is no rebound and no guarding. Hernia confirmed negative in the right inguinal area and confirmed negative in the left inguinal area.   Genitourinary: Rectum normal and vagina normal. Rectal exam shows guaiac negative stool. No labial fusion. There is no rash, tenderness, lesion, injury or Bartholin's cyst on the right labia. There is no rash, tenderness, lesion, injury or Bartholin's cyst on the left labia. Uterus is absent.   Cervix is absent. Right adnexum displays no mass, no tenderness and no fullness. Left adnexum displays no mass, no tenderness and no fullness.   Musculoskeletal: She exhibits no edema.   Lymphadenopathy:     She has no cervical adenopathy.        Right: No inguinal adenopathy present.        Left: No inguinal adenopathy present.   Neurological: She is alert and oriented to person, place, and time. No cranial nerve deficit. Coordination normal.   Skin: Skin is warm and dry.   Psychiatric: She has a normal mood and affect.   Nursing note and vitals reviewed.      ECG 12 Lead  Date/Time: 3/26/2019 9:45 AM  Performed by: Kelley Coon  Authorized by: Imani Dumont MD   Previous ECG: no previous ECG available  Rhythm: sinus bradycardia  Rate: bradycardic  BPM: 40  Conduction: conduction normal  ST Segments: ST segments normal  T Waves: T waves normal  QRS axis: vertical.    Clinical impression: non-specific ECG            Assessment/Plan   Imani was seen today for annual exam and gynecologic exam.    Diagnoses and all orders for this visit:    Annual physical exam  -     Comprehensive Metabolic Panel; Future  -     CBC & Differential; Future  -     Urinalysis With Microscopic If Indicated (No Culture) - Urine, Clean Catch; Future  -     Lipid Panel;  Future  -     TSH Rfx On Abnormal To Free T4; Future    Encounter for Hemoccult screening  -     POC FECAL OCCULT BLOOD BY IMMUNOASSAY    Need for Tdap vaccination  -     Tdap Vaccine Greater Than or Equal To 6yo IM    Vaginal Pap smear  -     Pap IG, HPV-hr; Future    Other orders  -     conjugated estrogens (PREMARIN) 0.625 MG/GM vaginal cream; Insert 0.5 mgm intravaginally twice a week  -     estradiol (ESTRACE) 0.5 MG tablet; Take 1 tablet by mouth Daily.  -     gabapentin (NEURONTIN) 300 MG capsule; Take 1 capsule by mouth 2 (Two) Times a Day.      Encouraged prudent diet, regular exercise.  Advised using both Premarin cream and Estrace tablets duplicated therapy.  She states that she has been doing this for about 5 years per her gynecologist.

## 2019-03-28 LAB
CHROM ANALY OVERALL INTERP-IMP: NORMAL
CONV .: NORMAL
CONV PERFORMED BY:: NORMAL
DX ICD CODE: NORMAL
HIV 1 & 2 AB SER-IMP: NORMAL
HPV I/H RISK 1 DNA CVX QL PROBE+SIG AMP: NEGATIVE
REF LAB TEST METHOD: NORMAL
STAT OF ADQ CVX/VAG CYTO-IMP: NORMAL

## 2019-06-20 ENCOUNTER — OFFICE VISIT (OUTPATIENT)
Dept: CARDIOLOGY | Facility: CLINIC | Age: 54
End: 2019-06-20

## 2019-06-20 ENCOUNTER — HOSPITAL ENCOUNTER (OUTPATIENT)
Dept: CARDIOLOGY | Facility: HOSPITAL | Age: 54
Discharge: HOME | End: 2019-06-20
Admitting: INTERNAL MEDICINE

## 2019-06-20 VITALS
HEIGHT: 66 IN | SYSTOLIC BLOOD PRESSURE: 142 MMHG | WEIGHT: 196.6 LBS | HEART RATE: 55 BPM | OXYGEN SATURATION: 98 % | DIASTOLIC BLOOD PRESSURE: 92 MMHG | BODY MASS INDEX: 31.6 KG/M2

## 2019-06-20 DIAGNOSIS — R06.09 DYSPNEA ON EXERTION: ICD-10-CM

## 2019-06-20 DIAGNOSIS — R06.09 DYSPNEA ON EXERTION: Primary | ICD-10-CM

## 2019-06-20 LAB
BH CV STRESS BP STAGE 1: NORMAL
BH CV STRESS BP STAGE 2: NORMAL
BH CV STRESS BP STAGE 3: NORMAL
BH CV STRESS DURATION MIN STAGE 1: 3
BH CV STRESS DURATION MIN STAGE 2: 3
BH CV STRESS DURATION MIN STAGE 3: 3
BH CV STRESS DURATION SEC STAGE 1: 0
BH CV STRESS DURATION SEC STAGE 2: 0
BH CV STRESS DURATION SEC STAGE 3: 0
BH CV STRESS GRADE STAGE 1: 10
BH CV STRESS GRADE STAGE 2: 12
BH CV STRESS GRADE STAGE 3: 14
BH CV STRESS HR STAGE 1: 98
BH CV STRESS HR STAGE 2: 129
BH CV STRESS HR STAGE 3: 152
BH CV STRESS METS STAGE 1: 5
BH CV STRESS METS STAGE 2: 7.5
BH CV STRESS METS STAGE 3: 10
BH CV STRESS PROTOCOL 1: NORMAL
BH CV STRESS RECOVERY BP: NORMAL MMHG
BH CV STRESS RECOVERY HR: 84 BPM
BH CV STRESS SPEED STAGE 1: 1.7
BH CV STRESS SPEED STAGE 2: 2.5
BH CV STRESS SPEED STAGE 3: 3.4
BH CV STRESS STAGE 1: 1
BH CV STRESS STAGE 2: 2
BH CV STRESS STAGE 3: 3
MAXIMAL PREDICTED HEART RATE: 164 BPM
PERCENT MAX PREDICTED HR: 92.68 %
STRESS BASELINE BP: NORMAL MMHG
STRESS BASELINE HR: 61 BPM
STRESS PERCENT HR: 109 %
STRESS POST ESTIMATED WORKLOAD: 10 METS
STRESS POST EXERCISE DUR MIN: 9 MIN
STRESS POST EXERCISE DUR SEC: 0 SEC
STRESS POST PEAK BP: NORMAL MMHG
STRESS POST PEAK HR: 152 BPM
STRESS TARGET HR: 139 BPM

## 2019-06-20 PROCEDURE — 93000 ELECTROCARDIOGRAM COMPLETE: CPT | Performed by: INTERNAL MEDICINE

## 2019-06-20 PROCEDURE — 93018 CV STRESS TEST I&R ONLY: CPT | Performed by: INTERNAL MEDICINE

## 2019-06-20 PROCEDURE — 93016 CV STRESS TEST SUPVJ ONLY: CPT | Performed by: INTERNAL MEDICINE

## 2019-06-20 PROCEDURE — 99203 OFFICE O/P NEW LOW 30 MIN: CPT | Performed by: INTERNAL MEDICINE

## 2019-06-20 PROCEDURE — 93017 CV STRESS TEST TRACING ONLY: CPT

## 2019-06-20 NOTE — PROGRESS NOTES
Date of Office Visit: 2019  Encounter Provider: Thanh Lorenzana MD  Place of Service: [facility] CARDIOLOGY  Patient Name: Imani Mazariegos  :1965  Imani Dumont MD    Chief Complaint   Patient presents with   • Establish Care     SOA, elev. HR, nausea on exertion and family hx of SSS     History of Present Illness    The patient is a 54-year-old white female without prior cardiac history who enters the office today essentially for an evaluation because of symptoms that have occurred during her physical activity at the gym.    The patient started exercising approximately 2 months ago.  During her vigorous exercise sometimes she will feel short of breath and also noted significant increase in her heart rate.  Yesterday she felt like she was going to lose consciousness but never did.    The patient realizes that she is significantly overweight.  She has not exercised much up until 2 months ago.  She does not report any problems with blood pressure, diabetes or hyperlipidemia.  Her family history is significant for coronary disease.  She does not smoke.    Past Medical History:   Diagnosis Date   • Abdominal pain, LLQ    • Abdominal pain, LUQ    • Back pain, lumbosacral    • Bradycardia    • Epilepsy without status epilepticus, not intractable (CMS/HCC)    • Hyperlipidemia    • Multiple falls    • Seizures (CMS/HCC)    • SOB (shortness of breath)    • Symptomatic states associated with artificial menopause          Past Surgical History:   Procedure Laterality Date   • APPENDECTOMY     •  SECTION     • HYSTERECTOMY      SAMMI, BSO           Current Outpatient Medications:   •  estradiol (ESTRACE) 0.5 MG tablet, Take 1 tablet by mouth Daily., Disp: 90 tablet, Rfl: 1  •  gabapentin (NEURONTIN) 300 MG capsule, Take 1 capsule by mouth 2 (Two) Times a Day., Disp: 60 capsule, Rfl: 5      Social History     Socioeconomic History   • Marital status:       "Spouse name: Not on file   • Number of children: 1   • Years of education: BSN   • Highest education level: Not on file   Occupational History   • Occupation: RN   Tobacco Use   • Smoking status: Never Smoker   • Smokeless tobacco: Never Used   • Tobacco comment: caffeine use   Substance and Sexual Activity   • Alcohol use: No   • Drug use: No   • Sexual activity: Defer   Social History Narrative    LIVES WITH SPOUSE         Review of Systems   HENT: Negative.    Eyes: Negative.    Cardiovascular: Positive for dyspnea on exertion.   Respiratory: Negative.    Endocrine: Negative.    Skin: Negative.    Musculoskeletal: Negative.    Gastrointestinal: Negative.    Neurological: Negative.    Psychiatric/Behavioral: Negative.        Procedures      ECG 12 Lead  Date/Time: 6/20/2019 11:10 AM  Performed by: Thahn Lorenzana MD  Authorized by: Thanh Lorenzana MD   Comparison: not compared with previous ECG   Rhythm: sinus rhythm  Rate: normal  Conduction: conduction normal  ST Segments: ST segments normal  QRS axis: normal                  Objective:    /92 (BP Location: Left arm, Patient Position: Sitting, Cuff Size: Adult)   Pulse 55   Ht 167.6 cm (66\")   Wt 89.2 kg (196 lb 9.6 oz)   SpO2 98%   BMI 31.73 kg/m²         Physical Exam   Constitutional: She is oriented to person, place, and time. She appears well-developed and well-nourished.   Overweight   HENT:   Head: Normocephalic.   Eyes: Pupils are equal, round, and reactive to light.   Neck: Normal range of motion. No JVD present. Carotid bruit is not present. No thyromegaly present.   Cardiovascular: Normal rate, regular rhythm, S1 normal, S2 normal, normal heart sounds and intact distal pulses. Exam reveals no gallop and no friction rub.   No murmur heard.  Pulmonary/Chest: Effort normal and breath sounds normal.   Abdominal: Soft. Bowel sounds are normal.   Musculoskeletal: She exhibits no edema.   Neurological: She is alert and oriented to " person, place, and time.   Skin: Skin is warm, dry and intact. No erythema.   Psychiatric: She has a normal mood and affect.   Vitals reviewed.          Assessment:       Diagnosis Plan   1. Dyspnea on exertion  Treadmill Stress Test     The patient exercised on the treadmill for 9 minutes.  She had normal hemodynamic response to exercise.  There were no EKG changes noted.  At this point I do not think there is any significant concern.  I think the patient may have to decrease her exertion until she builds up her tolerance.  Overall I think she has a deconditioned state but no evidence of underlying coronary disease       Plan:       I appreciate the opportunity to see this patient in consultation.

## 2019-09-11 RX ORDER — GABAPENTIN 300 MG/1
CAPSULE ORAL
Qty: 180 CAPSULE | Refills: 0 | OUTPATIENT
Start: 2019-09-11 | End: 2019-09-27 | Stop reason: SDUPTHER

## 2019-09-24 RX ORDER — ESTRADIOL 0.5 MG/1
TABLET ORAL
Qty: 90 TABLET | Refills: 1 | Status: SHIPPED | OUTPATIENT
Start: 2019-09-24 | End: 2019-09-27 | Stop reason: SDUPTHER

## 2019-09-27 ENCOUNTER — OFFICE VISIT (OUTPATIENT)
Dept: INTERNAL MEDICINE | Facility: CLINIC | Age: 54
End: 2019-09-27

## 2019-09-27 VITALS
HEART RATE: 53 BPM | HEIGHT: 66 IN | SYSTOLIC BLOOD PRESSURE: 124 MMHG | OXYGEN SATURATION: 98 % | WEIGHT: 196 LBS | DIASTOLIC BLOOD PRESSURE: 80 MMHG | BODY MASS INDEX: 31.5 KG/M2

## 2019-09-27 DIAGNOSIS — G40.802 OTHER EPILEPSY WITHOUT STATUS EPILEPTICUS, NOT INTRACTABLE (HCC): Primary | ICD-10-CM

## 2019-09-27 DIAGNOSIS — E78.5 HYPERLIPIDEMIA, UNSPECIFIED HYPERLIPIDEMIA TYPE: ICD-10-CM

## 2019-09-27 PROCEDURE — 99213 OFFICE O/P EST LOW 20 MIN: CPT | Performed by: INTERNAL MEDICINE

## 2019-09-27 RX ORDER — ESTRADIOL 0.5 MG/1
0.5 TABLET ORAL DAILY
Qty: 90 TABLET | Refills: 1 | Status: SHIPPED | OUTPATIENT
Start: 2019-09-27 | End: 2020-03-25 | Stop reason: SDUPTHER

## 2019-09-27 RX ORDER — GABAPENTIN 300 MG/1
300 CAPSULE ORAL 2 TIMES DAILY
Qty: 180 CAPSULE | Refills: 1 | Status: SHIPPED | OUTPATIENT
Start: 2019-09-27 | End: 2020-06-01

## 2019-09-27 NOTE — PROGRESS NOTES
"Chief Complaint   Patient presents with   • Hyperlipidemia     6 month follow up       Subjective   Imani Mazariegos is a 54 y.o. female.     History of Present Illness     She continues to take gabapentin for epilepsy.  She has tried to reduce dose, but seizures come back.  She wonders if she is having migraine variant instead of seizure activity.  In the past, EEGs have been negative.  However, she does remember having seizures as a child.  She has noticed that if she forgets to take 1 of her doses of gabapentin that she will have aura-like sensation.  She describes this as \"fading\".    She has mildly elevated LDL cholesterol.  HDL is good.  She is following prudent diet.  She tries to exercise regularly.    The following portions of the patient's history were reviewed and updated as appropriate: allergies, current medications, past family history, past medical history, past social history, past surgical history and problem list.    Review of Systems   Constitutional: Negative for appetite change.   HENT: Negative for nosebleeds.    Eyes: Negative for blurred vision and double vision.   Respiratory: Negative for cough and shortness of breath.    Cardiovascular: Negative for chest pain, palpitations and leg swelling.         Current Outpatient Medications:   •  estradiol (ESTRACE) 0.5 MG tablet, Take 1 tablet by mouth Daily., Disp: 90 tablet, Rfl: 1  •  gabapentin (NEURONTIN) 300 MG capsule, Take 1 capsule by mouth 2 (Two) Times a Day., Disp: 180 capsule, Rfl: 1        Objective     /80   Pulse 53   Ht 167.6 cm (66\")   Wt 88.9 kg (196 lb)   SpO2 98%   BMI 31.64 kg/m²     Physical Exam   Constitutional: She is oriented to person, place, and time. She appears well-developed and well-nourished. No distress.   Neck: Normal carotid pulses present. Carotid bruit is not present.   Cardiovascular: Regular rhythm, S1 normal and S2 normal. Exam reveals no gallop and no friction rub.   No murmur heard.  Pulses:       " Carotid pulses are 2+ on the right side, and 2+ on the left side.  Pulmonary/Chest: Effort normal and breath sounds normal. She has no wheezes. She has no rhonchi. She has no rales. Chest wall is not dull to percussion.   Musculoskeletal: She exhibits no edema.   Neurological: She is alert and oriented to person, place, and time.   Skin: Skin is warm and dry.   Nursing note and vitals reviewed.        Assessment/Plan   Imani was seen today for hyperlipidemia.    Diagnoses and all orders for this visit:    Other epilepsy without status epilepticus, not intractable (CMS/HCC)    Hyperlipidemia, unspecified hyperlipidemia type    Other orders  -     gabapentin (NEURONTIN) 300 MG capsule; Take 1 capsule by mouth 2 (Two) Times a Day.  -     estradiol (ESTRACE) 0.5 MG tablet; Take 1 tablet by mouth Daily.      She will continue gabapentin for history of seizures.  Advised her that this also helps to reduce frequency of migraines.  Encouraged prudent diet and regular exercise for hyperlipidemia.

## 2019-10-25 ENCOUNTER — APPOINTMENT (OUTPATIENT)
Dept: WOMENS IMAGING | Facility: HOSPITAL | Age: 54
End: 2019-10-25

## 2019-10-25 PROCEDURE — 77063 BREAST TOMOSYNTHESIS BI: CPT | Performed by: RADIOLOGY

## 2019-10-25 PROCEDURE — 77067 SCR MAMMO BI INCL CAD: CPT | Performed by: RADIOLOGY

## 2020-02-07 ENCOUNTER — TELEPHONE (OUTPATIENT)
Dept: INTERNAL MEDICINE | Facility: CLINIC | Age: 55
End: 2020-02-07

## 2020-02-07 ENCOUNTER — OFFICE VISIT (OUTPATIENT)
Dept: INTERNAL MEDICINE | Facility: CLINIC | Age: 55
End: 2020-02-07

## 2020-02-07 VITALS
HEART RATE: 66 BPM | OXYGEN SATURATION: 98 % | TEMPERATURE: 98 F | SYSTOLIC BLOOD PRESSURE: 144 MMHG | HEIGHT: 66 IN | BODY MASS INDEX: 31.69 KG/M2 | RESPIRATION RATE: 16 BRPM | WEIGHT: 197.2 LBS | DIASTOLIC BLOOD PRESSURE: 88 MMHG

## 2020-02-07 DIAGNOSIS — R10.13 EPIGASTRIC PAIN: ICD-10-CM

## 2020-02-07 DIAGNOSIS — R53.83 FATIGUE, UNSPECIFIED TYPE: Primary | ICD-10-CM

## 2020-02-07 DIAGNOSIS — R14.2 BELCHING: ICD-10-CM

## 2020-02-07 DIAGNOSIS — R11.0 NAUSEA: ICD-10-CM

## 2020-02-07 PROCEDURE — 99213 OFFICE O/P EST LOW 20 MIN: CPT | Performed by: NURSE PRACTITIONER

## 2020-02-07 RX ORDER — ONDANSETRON 4 MG/1
4 TABLET, FILM COATED ORAL EVERY 8 HOURS PRN
Qty: 30 TABLET | Refills: 0 | Status: SHIPPED | OUTPATIENT
Start: 2020-02-07 | End: 2020-08-03

## 2020-02-07 RX ORDER — PANTOPRAZOLE SODIUM 40 MG/1
40 TABLET, DELAYED RELEASE ORAL DAILY
Qty: 30 TABLET | Refills: 1 | Status: SHIPPED | OUTPATIENT
Start: 2020-02-07 | End: 2020-03-02

## 2020-02-07 NOTE — PROGRESS NOTES
"Subjective   Imani Mazariegos is a 54 y.o. female.   CC: Fatigue, epigastric discomfort, sour belching    Patient presents for evaluation of fatigue and epigastric discomfort/belching.  This is a 54-year-old female patient of Dr. Dumont.  She has a history of excessive ibuprofen use but not in several months.  She reports that 2 weeks ago she took 1 ibuprofen and has had symptoms of epigastric discomfort, belching and reflux ever since.  She has also been very fatigued.  She has been nauseated off and on but no vomiting.  She endorses epigastric pain that at times radiates to the other upper areas of the abdomen.  She has not had any stool abnormalities and denies any blood in the stool.  He endorses subjective fevers at night but has not measured her temperature.  Generally she has felt fatigued and weak for the past 2 weeks.  She had some sinus congestion last week but reports that this has \"mostly cleared up.\"  She denies chest pain.  She denies development of any other new issues today.       The following portions of the patient's history were reviewed and updated as appropriate: allergies, current medications, past family history, past medical history, past social history, past surgical history and problem list.    Review of Systems   Constitutional: Positive for fatigue. Negative for activity change, chills, fever, unexpected weight gain and unexpected weight loss.   HENT: Negative for congestion, hearing loss, postnasal drip, sinus pressure, sneezing, sore throat, swollen glands and tinnitus.    Eyes: Negative for photophobia, pain and visual disturbance.   Respiratory: Negative for cough, chest tightness, shortness of breath and wheezing.    Cardiovascular: Negative for chest pain, palpitations and leg swelling.   Gastrointestinal: Positive for nausea, GERD and indigestion. Negative for abdominal distention, abdominal pain, constipation, diarrhea and vomiting.   Endocrine: Negative for polydipsia, polyphagia and " "polyuria.   Genitourinary: Negative for dysuria, frequency, hematuria and urgency.   Neurological: Negative for dizziness, weakness, numbness and headache.   All other systems reviewed and are negative.      Objective    /88 (BP Location: Right arm, Patient Position: Sitting, Cuff Size: Adult)   Pulse 66   Temp 98 °F (36.7 °C) (Oral)   Resp 16   Ht 167.6 cm (66\")   Wt 89.4 kg (197 lb 3.2 oz)   SpO2 98%   BMI 31.83 kg/m²     Physical Exam   Constitutional: She is oriented to person, place, and time. She appears well-developed and well-nourished. No distress.   HENT:   Head: Normocephalic and atraumatic.   Eyes: Pupils are equal, round, and reactive to light. EOM are normal.   Neck: Normal range of motion. Neck supple.   Cardiovascular: Normal rate, regular rhythm, normal heart sounds and intact distal pulses. Exam reveals no gallop and no friction rub.   No murmur heard.  Pulmonary/Chest: Effort normal and breath sounds normal.   Lungs are CTA bilaterally   Abdominal: Soft. Normal appearance and bowel sounds are normal. She exhibits no distension and no mass. There is no hepatosplenomegaly. There is tenderness in the epigastric area. There is no rebound, no guarding, no CVA tenderness, no tenderness at McBurney's point and negative Hamilton's sign. No hernia.   Musculoskeletal: Normal range of motion.   Neurological: She is alert and oriented to person, place, and time.   Skin: Skin is warm and dry. Capillary refill takes less than 2 seconds. She is not diaphoretic.   Psychiatric: She has a normal mood and affect. Her behavior is normal. Judgment and thought content normal.   Nursing note and vitals reviewed.    Current outpatient and discharge medications have been reconciled for the patient.  Reviewed by: CASSIE Hester      Assessment/Plan   Imani was seen today for nasal congestion, nausea and fatigue.    Diagnoses and all orders for this visit:    Fatigue, unspecified type  -     CBC & " Differential  -     Comprehensive metabolic panel  -     Vitamin B12  -     Vitamin D 25 hydroxy  -     TSH  -     T4, Free  -     Urinalysis With Culture If Indicated - Urine, Clean Catch    Epigastric pain  -     ondansetron (ZOFRAN) 4 MG tablet; Take 1 tablet by mouth Every 8 (Eight) Hours As Needed for Nausea or Vomiting.  -     pantoprazole (PROTONIX) 40 MG EC tablet; Take 1 tablet by mouth Daily.    Belching  -     ondansetron (ZOFRAN) 4 MG tablet; Take 1 tablet by mouth Every 8 (Eight) Hours As Needed for Nausea or Vomiting.  -     pantoprazole (PROTONIX) 40 MG EC tablet; Take 1 tablet by mouth Daily.    Nausea  -     ondansetron (ZOFRAN) 4 MG tablet; Take 1 tablet by mouth Every 8 (Eight) Hours As Needed for Nausea or Vomiting.  -     pantoprazole (PROTONIX) 40 MG EC tablet; Take 1 tablet by mouth Daily.      -Epigastric discomfort, belching, nausea: Very likely uncontrolled GERD versus gastritis.  Avoid all NSAIDs and will provide Zofran for nausea and pantoprazole for acid suppression.  If symptoms persist or worsen despite PPI addition, we will consult gastroenterology and possible H. pylori testing.    -Fatigue: We will investigate with labs including CBC, CMP, B12, vitamin D, TSH, free T4 and urinalysis.    -We will contact patient with results of her labs and any further recommendations.  Follow-up PRN and routinely with PCP, Dr. Dumont.

## 2020-02-08 ENCOUNTER — HOSPITAL ENCOUNTER (EMERGENCY)
Facility: HOSPITAL | Age: 55
Discharge: HOME | End: 2020-02-08
Attending: EMERGENCY MEDICINE | Admitting: EMERGENCY MEDICINE

## 2020-02-08 ENCOUNTER — APPOINTMENT (OUTPATIENT)
Dept: CT IMAGING | Facility: HOSPITAL | Age: 55
End: 2020-02-08

## 2020-02-08 ENCOUNTER — APPOINTMENT (OUTPATIENT)
Dept: GENERAL RADIOLOGY | Facility: HOSPITAL | Age: 55
End: 2020-02-08

## 2020-02-08 VITALS
RESPIRATION RATE: 15 BRPM | HEIGHT: 66 IN | WEIGHT: 194 LBS | OXYGEN SATURATION: 99 % | TEMPERATURE: 97.3 F | SYSTOLIC BLOOD PRESSURE: 124 MMHG | DIASTOLIC BLOOD PRESSURE: 71 MMHG | HEART RATE: 48 BPM | BODY MASS INDEX: 31.18 KG/M2

## 2020-02-08 DIAGNOSIS — S39.012A LUMBAR STRAIN, INITIAL ENCOUNTER: Primary | ICD-10-CM

## 2020-02-08 LAB
25(OH)D3+25(OH)D2 SERPL-MCNC: 14.6 NG/ML (ref 30–100)
ALBUMIN SERPL-MCNC: 4.4 G/DL (ref 3.5–5.2)
ALBUMIN/GLOB SERPL: 2.2 G/DL
ALP SERPL-CCNC: 73 U/L (ref 39–117)
ALT SERPL-CCNC: 22 U/L (ref 1–33)
ANION GAP SERPL CALCULATED.3IONS-SCNC: 11.5 MMOL/L (ref 5–15)
APPEARANCE UR: CLEAR
AST SERPL-CCNC: 26 U/L (ref 1–32)
BACTERIA #/AREA URNS HPF: ABNORMAL /HPF
BASOPHILS # BLD AUTO: 0.03 10*3/MM3 (ref 0–0.2)
BASOPHILS # BLD AUTO: 0.04 10*3/MM3 (ref 0–0.2)
BASOPHILS NFR BLD AUTO: 0.5 % (ref 0–1.5)
BASOPHILS NFR BLD AUTO: 0.7 % (ref 0–1.5)
BILIRUB SERPL-MCNC: 0.2 MG/DL (ref 0.2–1.2)
BILIRUB UR QL STRIP: NEGATIVE
BILIRUB UR QL STRIP: NEGATIVE
BUN BLD-MCNC: 18 MG/DL (ref 6–20)
BUN SERPL-MCNC: 21 MG/DL (ref 6–20)
BUN/CREAT SERPL: 21.4 (ref 7–25)
BUN/CREAT SERPL: 23.9 (ref 7–25)
CALCIUM SERPL-MCNC: 9.1 MG/DL (ref 8.6–10.5)
CALCIUM SPEC-SCNC: 8.9 MG/DL (ref 8.6–10.5)
CHLORIDE SERPL-SCNC: 102 MMOL/L (ref 98–107)
CHLORIDE SERPL-SCNC: 106 MMOL/L (ref 98–107)
CLARITY UR: CLEAR
CO2 SERPL-SCNC: 23.5 MMOL/L (ref 22–29)
CO2 SERPL-SCNC: 25.2 MMOL/L (ref 22–29)
COLOR UR: YELLOW
COLOR UR: YELLOW
CREAT BLD-MCNC: 0.84 MG/DL (ref 0.57–1)
CREAT SERPL-MCNC: 0.88 MG/DL (ref 0.57–1)
CRYSTALS URNS MICRO: ABNORMAL
DEPRECATED RDW RBC AUTO: 42.2 FL (ref 37–54)
EOSINOPHIL # BLD AUTO: 0.07 10*3/MM3 (ref 0–0.4)
EOSINOPHIL # BLD AUTO: 0.12 10*3/MM3 (ref 0–0.4)
EOSINOPHIL NFR BLD AUTO: 1.1 % (ref 0.3–6.2)
EOSINOPHIL NFR BLD AUTO: 2 % (ref 0.3–6.2)
EPI CELLS #/AREA URNS HPF: ABNORMAL /HPF (ref 0–10)
ERYTHROCYTE [DISTWIDTH] IN BLOOD BY AUTOMATED COUNT: 12 % (ref 12.3–15.4)
ERYTHROCYTE [DISTWIDTH] IN BLOOD BY AUTOMATED COUNT: 12.4 % (ref 12.3–15.4)
FLUAV AG NPH QL: NEGATIVE
FLUBV AG NPH QL IA: NEGATIVE
GFR SERPL CREATININE-BSD FRML MDRD: 70 ML/MIN/1.73
GLOBULIN SER CALC-MCNC: 2 GM/DL
GLUCOSE BLD-MCNC: 100 MG/DL (ref 65–99)
GLUCOSE SERPL-MCNC: 83 MG/DL (ref 65–99)
GLUCOSE UR QL: NEGATIVE
GLUCOSE UR STRIP-MCNC: NEGATIVE MG/DL
HCT VFR BLD AUTO: 44 % (ref 34–46.6)
HCT VFR BLD AUTO: 44.2 % (ref 34–46.6)
HETEROPH AB SER QL LA: NEGATIVE
HGB BLD-MCNC: 14.5 G/DL (ref 12–15.9)
HGB BLD-MCNC: 14.6 G/DL (ref 12–15.9)
HGB UR QL STRIP.AUTO: NEGATIVE
HGB UR QL STRIP: NEGATIVE
IMM GRANULOCYTES # BLD AUTO: 0.01 10*3/MM3 (ref 0–0.05)
IMM GRANULOCYTES # BLD AUTO: 0.01 10*3/MM3 (ref 0–0.05)
IMM GRANULOCYTES NFR BLD AUTO: 0.2 % (ref 0–0.5)
IMM GRANULOCYTES NFR BLD AUTO: 0.2 % (ref 0–0.5)
KETONES UR QL STRIP: NEGATIVE
KETONES UR QL STRIP: NEGATIVE
LEUKOCYTE ESTERASE UR QL STRIP.AUTO: NEGATIVE
LEUKOCYTE ESTERASE UR QL STRIP: NEGATIVE
LYMPHOCYTES # BLD AUTO: 1.66 10*3/MM3 (ref 0.7–3.1)
LYMPHOCYTES # BLD AUTO: 1.73 10*3/MM3 (ref 0.7–3.1)
LYMPHOCYTES NFR BLD AUTO: 27 % (ref 19.6–45.3)
LYMPHOCYTES NFR BLD AUTO: 28.6 % (ref 19.6–45.3)
MCH RBC QN AUTO: 30.5 PG (ref 26.6–33)
MCH RBC QN AUTO: 30.8 PG (ref 26.6–33)
MCHC RBC AUTO-ENTMCNC: 32.8 G/DL (ref 31.5–35.7)
MCHC RBC AUTO-ENTMCNC: 33.2 G/DL (ref 31.5–35.7)
MCV RBC AUTO: 92.8 FL (ref 79–97)
MCV RBC AUTO: 93.1 FL (ref 79–97)
MICRO URNS: NORMAL
MICRO URNS: NORMAL
MONOCYTES # BLD AUTO: 0.64 10*3/MM3 (ref 0.1–0.9)
MONOCYTES # BLD AUTO: 0.81 10*3/MM3 (ref 0.1–0.9)
MONOCYTES NFR BLD AUTO: 10.4 % (ref 5–12)
MONOCYTES NFR BLD AUTO: 13.4 % (ref 5–12)
MUCOUS THREADS URNS QL MICRO: PRESENT /HPF
NEUTROPHILS # BLD AUTO: 3.34 10*3/MM3 (ref 1.7–7)
NEUTROPHILS # BLD AUTO: 3.72 10*3/MM3 (ref 1.7–7)
NEUTROPHILS NFR BLD AUTO: 55.3 % (ref 42.7–76)
NEUTROPHILS NFR BLD AUTO: 60.6 % (ref 42.7–76)
NITRITE UR QL STRIP: NEGATIVE
NITRITE UR QL STRIP: NEGATIVE
NRBC BLD AUTO-RTO: 0 /100 WBC (ref 0–0.2)
NRBC BLD AUTO-RTO: 0 /100 WBC (ref 0–0.2)
PH UR STRIP.AUTO: 6 [PH] (ref 5–8)
PH UR STRIP: 6 [PH] (ref 5–7.5)
PLATELET # BLD AUTO: 276 10*3/MM3 (ref 140–450)
PLATELET # BLD AUTO: 287 10*3/MM3 (ref 140–450)
PMV BLD AUTO: 9.9 FL (ref 6–12)
POTASSIUM BLD-SCNC: 4.3 MMOL/L (ref 3.5–5.2)
POTASSIUM SERPL-SCNC: 4.4 MMOL/L (ref 3.5–5.2)
PROT SERPL-MCNC: 6.4 G/DL (ref 6–8.5)
PROT UR QL STRIP: NEGATIVE
PROT UR QL STRIP: NEGATIVE
RBC # BLD AUTO: 4.74 10*6/MM3 (ref 3.77–5.28)
RBC # BLD AUTO: 4.75 10*6/MM3 (ref 3.77–5.28)
RBC #/AREA URNS HPF: ABNORMAL /HPF (ref 0–2)
SODIUM BLD-SCNC: 141 MMOL/L (ref 136–145)
SODIUM SERPL-SCNC: 140 MMOL/L (ref 136–145)
SP GR UR STRIP: 1.01 (ref 1–1.03)
SP GR UR: 1.02 (ref 1–1.03)
T4 FREE SERPL-MCNC: 1.04 NG/DL (ref 0.93–1.7)
TROPONIN T SERPL-MCNC: <0.01 NG/ML (ref 0–0.03)
TSH SERPL DL<=0.005 MIU/L-ACNC: 3.36 UIU/ML (ref 0.27–4.2)
UNIDENT CRYS URNS QL MICRO: PRESENT /LPF
URINALYSIS REFLEX: NORMAL
UROBILINOGEN UR QL STRIP: NORMAL
UROBILINOGEN UR STRIP-MCNC: 0.2 MG/DL (ref 0.2–1)
VIT B12 SERPL-MCNC: 499 PG/ML (ref 211–946)
WBC # BLD AUTO: 6.14 10*3/MM3 (ref 3.4–10.8)
WBC #/AREA URNS HPF: ABNORMAL /HPF (ref 0–5)
WBC NRBC COR # BLD: 6.04 10*3/MM3 (ref 3.4–10.8)

## 2020-02-08 PROCEDURE — 87804 INFLUENZA ASSAY W/OPTIC: CPT | Performed by: NURSE PRACTITIONER

## 2020-02-08 PROCEDURE — 86308 HETEROPHILE ANTIBODY SCREEN: CPT | Performed by: NURSE PRACTITIONER

## 2020-02-08 PROCEDURE — 96374 THER/PROPH/DIAG INJ IV PUSH: CPT

## 2020-02-08 PROCEDURE — 96375 TX/PRO/DX INJ NEW DRUG ADDON: CPT

## 2020-02-08 PROCEDURE — 96372 THER/PROPH/DIAG INJ SC/IM: CPT

## 2020-02-08 PROCEDURE — 71046 X-RAY EXAM CHEST 2 VIEWS: CPT

## 2020-02-08 PROCEDURE — 85025 COMPLETE CBC W/AUTO DIFF WBC: CPT | Performed by: NURSE PRACTITIONER

## 2020-02-08 PROCEDURE — 80048 BASIC METABOLIC PNL TOTAL CA: CPT | Performed by: NURSE PRACTITIONER

## 2020-02-08 PROCEDURE — 74176 CT ABD & PELVIS W/O CONTRAST: CPT

## 2020-02-08 PROCEDURE — 93005 ELECTROCARDIOGRAM TRACING: CPT | Performed by: NURSE PRACTITIONER

## 2020-02-08 PROCEDURE — 25010000002 KETOROLAC TROMETHAMINE PER 15 MG: Performed by: NURSE PRACTITIONER

## 2020-02-08 PROCEDURE — 84484 ASSAY OF TROPONIN QUANT: CPT | Performed by: NURSE PRACTITIONER

## 2020-02-08 PROCEDURE — 25010000002 TRIAMCINOLONE PER 10 MG: Performed by: NURSE PRACTITIONER

## 2020-02-08 PROCEDURE — 25010000002 ONDANSETRON PER 1 MG: Performed by: NURSE PRACTITIONER

## 2020-02-08 PROCEDURE — 93010 ELECTROCARDIOGRAM REPORT: CPT | Performed by: INTERNAL MEDICINE

## 2020-02-08 PROCEDURE — 99284 EMERGENCY DEPT VISIT MOD MDM: CPT

## 2020-02-08 PROCEDURE — 81003 URINALYSIS AUTO W/O SCOPE: CPT | Performed by: NURSE PRACTITIONER

## 2020-02-08 RX ORDER — ONDANSETRON 2 MG/ML
4 INJECTION INTRAMUSCULAR; INTRAVENOUS ONCE
Status: COMPLETED | OUTPATIENT
Start: 2020-02-08 | End: 2020-02-08

## 2020-02-08 RX ORDER — SODIUM CHLORIDE 0.9 % (FLUSH) 0.9 %
10 SYRINGE (ML) INJECTION AS NEEDED
Status: DISCONTINUED | OUTPATIENT
Start: 2020-02-08 | End: 2020-02-08 | Stop reason: HOSPADM

## 2020-02-08 RX ORDER — PREDNISONE 20 MG/1
40 TABLET ORAL DAILY
Qty: 10 TABLET | Refills: 0 | Status: SHIPPED | OUTPATIENT
Start: 2020-02-08 | End: 2020-02-13

## 2020-02-08 RX ORDER — DICLOFENAC SODIUM 75 MG/1
75 TABLET, DELAYED RELEASE ORAL 2 TIMES DAILY
Qty: 30 TABLET | Refills: 0 | Status: SHIPPED | OUTPATIENT
Start: 2020-02-08 | End: 2020-08-03

## 2020-02-08 RX ORDER — HYDROCODONE BITARTRATE AND ACETAMINOPHEN 7.5; 325 MG/1; MG/1
1 TABLET ORAL ONCE
Status: COMPLETED | OUTPATIENT
Start: 2020-02-08 | End: 2020-02-08

## 2020-02-08 RX ORDER — KETOROLAC TROMETHAMINE 15 MG/ML
15 INJECTION, SOLUTION INTRAMUSCULAR; INTRAVENOUS ONCE
Status: COMPLETED | OUTPATIENT
Start: 2020-02-08 | End: 2020-02-08

## 2020-02-08 RX ORDER — TRIAMCINOLONE ACETONIDE 40 MG/ML
40 INJECTION, SUSPENSION INTRA-ARTICULAR; INTRAMUSCULAR ONCE
Status: COMPLETED | OUTPATIENT
Start: 2020-02-08 | End: 2020-02-08

## 2020-02-08 RX ADMIN — TRIAMCINOLONE ACETONIDE 40 MG: 40 INJECTION, SUSPENSION INTRA-ARTICULAR; INTRAMUSCULAR at 15:21

## 2020-02-08 RX ADMIN — KETOROLAC TROMETHAMINE 15 MG: 15 INJECTION, SOLUTION INTRAMUSCULAR; INTRAVENOUS at 15:18

## 2020-02-08 RX ADMIN — HYDROCODONE BITARTRATE AND ACETAMINOPHEN 1 TABLET: 7.5; 325 TABLET ORAL at 13:43

## 2020-02-08 RX ADMIN — ONDANSETRON 4 MG: 2 INJECTION INTRAMUSCULAR; INTRAVENOUS at 13:37

## 2020-02-08 NOTE — ED PROVIDER NOTES
MD ATTESTATION NOTE    The ANGLE and I have discussed this patient's history, physical exam, and treatment plan.  I have reviewed the documentation and personally had a face to face interaction with the patient. I affirm the ANGLE documentation and agree with their findings, treatment, and plan.  The attached note describes my personal findings.      Imani Mazariegos is a 54 y.o. female with no chronic medical problems who presents with 2 weeks of multiple complaints.  Patient states all symptoms began at the same time.  Patient was having runny nose and congestion that has since resolved.  Patient has been having intermittent nausea over the 2 weeks, no emesis.  Patient also complains of worsened low back pain.  Patient has a history of sciatica that she has had for approximately 6 to 7 years.  Patient complaining of dull aching back pain on the left side, with occasional radiation to her right hip or buttock.  Patient denies any radiation of pain further down the leg.  Patient denies any incontinence of bowel or bladder, perineal anesthesia, lower extremity weakness or numbness.  Patient does endorse generalized weakness but no focal weakness.  Patient also complains of generalized fatigue.  No vomiting or diarrhea.  Patient does report tactile and subjective fevers occasionally.  No unexplained rashes.      On exam:  General: No acute distress.  Head: Normal cephalic, atraumatic  ENT: Extraocular motion intact, pupils equal and round reactive to light, moist mucous membranes  Neck: Supple, trachea midline  Cardiac, regular rate and rhythm, no murmur  Lungs: Clear to auscultation bilaterally  Abdomen: Soft, nontender, no rebound tenderness/guarding/rigidity  Back: Lumbar spine: No step-offs or deformities, no midline tenderness to palpation, left paraspinal tenderness.  No CVA tenderness.  Bilateral lower extremities: Negative straight leg raise.  5 out of 5 strength.  Sensation intact light touch.  No clonus.  Negative  Babinski sign.  Extremities: Moves all extremities well, no peripheral edema  Skin: Warm, dry    Medical Decision Making:  ED Course as of Feb 09 1611   Sat Feb 08, 2020   1306 Discussed with patient that I will order CT scan to rule out stone due to her having calcium oxylate crystals  noted in urine.  I will also chest CXR to evaluate for pneumonia since she had viral URI 1 week ago.  I also explained that I will check EKG and troponin due to her severe fatigue.  I reviewed her labs that were collected yesterday @ her PCP office visit. They are mostly unremarkable except for low vitamin D.  However, this level is not severely low.  I will let her PCP address replacement.     [EP]   1350 Spoke with Dr. Mathur, Radiologist.  He states she has multiple tiny, nonobstructing stones but  no obstruction.     [EP]   8846 Updated patient on course of work-up at this point.  Labs continue to be unremarkable as is urine.  Chest x-ray is normal as is EKG.  She has a history of the bradycardia she sees Dr. Lorenzana for this and they are monitoring it.  She is not having syncope or lightheadedness due to this.  I have encouraged her to follow-up with Dr. Lorenzana regarding the bradycardia however I feel that if she is able to exercise 3-5 times a week that she is doing okay at this point.  And there is no need for admission.  She also tells me she feels that it this is definitely her sciatica and I will discharge her on prednisone and anti-inflammatories    [EP]      ED Course User Index  [EP] Anjali Price APRN       Diagnosis  Final diagnoses:   Lumbar strain, initial encounter        Hunter Abraham MD  02/09/20 1611

## 2020-02-08 NOTE — ED PROVIDER NOTES
EMERGENCY DEPARTMENT ENCOUNTER    Room Number:  24/24  Date seen:  2/8/2020  Time seen: 1:06 PM  PCP: Imani Dumont MD  Historian: patient    HPI:  Chief complaint:low back pain, severe fatigue  Context:Imani Mazariegos is a 54 y.o. female who presents to the ED with c/o 2 weeks of LBP that is described as moderate and she has pain with sitting.  She is not taking any over-the-counter medications for this and it is persistent.  She also has no energy and nausea for 1 week.  She denies any urinary or fecal incontinence, falls, foot drag or problems ambulating. She works out 3-5 days a week but denies that she knowingly hurt her back.  She saw her primary care yesterday and had labs and urine collected.  She was started on Protonix and given Zofran for her nausea but states that it did not really help this morning.  These are available in epic and I reviewed them.  She denies chest pain, shortness of breath but states that this morning when she was on the toilet her knees buckled and she had a hard time standing up.  This is what prompted her family to make her come today for evaluation. She discusses having problems in past with sciatica, but this does not feel like sciatica to her.  She had an epidural 5 years ago for this but really has not had any problems since then.      MEDICAL RECORD REVIEW      ALLERGIES  Ciprofloxacin    PAST MEDICAL HISTORY  Active Ambulatory Problems     Diagnosis Date Noted   • Epilepsy without status epilepticus, not intractable (CMS/HCC)    • Symptomatic states associated with artificial menopause    • Abdominal pain, LLQ    • Abdominal pain, LUQ    • Back pain, lumbosacral    • Hyperlipidemia      Resolved Ambulatory Problems     Diagnosis Date Noted   • No Resolved Ambulatory Problems     Past Medical History:   Diagnosis Date   • Bradycardia    • Multiple falls    • Seizures (CMS/HCC)    • SOB (shortness of breath)        PAST SURGICAL HISTORY  Past Surgical History:   Procedure  Laterality Date   • APPENDECTOMY     •  SECTION     • HYSTERECTOMY      SAMMI, BSO       FAMILY HISTORY  Family History   Problem Relation Age of Onset   • Sick sinus syndrome Mother    • Heart failure Mother    • Pernicious anemia Mother    • Hepatitis Mother         Hep C   • Other Mother         Peripheral neuropathy   • Sick sinus syndrome Father         pacemaker   • Diabetes Maternal Grandmother    • Diabetes Paternal Grandmother    • No Known Problems Brother        SOCIAL HISTORY  Social History     Socioeconomic History   • Marital status:      Spouse name: Not on file   • Number of children: 1   • Years of education: BSN   • Highest education level: Not on file   Occupational History   • Occupation: RN   Tobacco Use   • Smoking status: Never Smoker   • Smokeless tobacco: Never Used   • Tobacco comment: caffeine use   Substance and Sexual Activity   • Alcohol use: No   • Drug use: No   • Sexual activity: Defer   Social History Narrative    LIVES WITH SPOUSE       REVIEW OF SYSTEMS  Review of Systems    All systems reviewed and negative except for those discussed in HPI.   PHYSICAL EXAM    ED Triage Vitals   Temp Heart Rate Resp BP SpO2   20 1231 20 1231 20 1231 20 1248 20 1231   97.3 °F (36.3 °C) 51 16 152/89 98 %      Temp src Heart Rate Source Patient Position BP Location FiO2 (%)   20 1231 20 1231 20 1248 20 1248 --   Tympanic Monitor Lying Right arm      Physical Exam    I have reviewed the triage vital signs and nursing notes.      GENERAL: appears slightly uncomfortable with position changes, not distressed  HENT: nares patent, mm moist, no oropharyngeal erythema, TM's normal, canal in left ear slightly irritated  EYES: no scleral icterus  NECK: no ROM limitations  CV: regular rhythm, regular rate, no MRG  RESPIRATORY: normal effort, CTAB  ABDOMEN: soft, rounded, no RUQ tenderness, no guarding or rebound  :  deferred  MUSCULOSKELETAL: no deformity  NEURO: alert, moves all extremities, follows commands  SKIN: warm, dry      PROGRESS, DATA ANALYSIS, CONSULTS AND MEDICAL DECISION MAKING  All labs have been independently reviewed by me.  All radiology studies have been reviewed by me and discussed with radiologist dictating the report.  EKG's independently viewed and interpreted by me unless stated otherwise. Discussion below represents my analysis of pertinent findings related to patient's condition, differential diagnosis, treatment plan and final disposition.     ED Course as of Feb 08 1724   Sat Feb 08, 2020   1306 Discussed with patient that I will order CT scan to rule out stone due to her having calcium oxylate crystals  noted in urine.  I will also chest CXR to evaluate for pneumonia since she had viral URI 1 week ago.  I also explained that I will check EKG and troponin due to her severe fatigue.  I reviewed her labs that were collected yesterday @ her PCP office visit. They are mostly unremarkable except for low vitamin D.  However, this level is not severely low.  I will let her PCP address replacement.     [EP]   5930 Spoke with Dr. Mathur, Radiologist.  He states she has multiple tiny, nonobstructing stones but  no obstruction.     [EP]   1516 Updated patient on course of work-up at this point.  Labs continue to be unremarkable as is urine.  Chest x-ray is normal as is EKG.  She has a history of the bradycardia she sees Dr. Lorenzana for this and they are monitoring it.  She is not having syncope or lightheadedness due to this.  I have encouraged her to follow-up with Dr. Lorenzana regarding the bradycardia however I feel that if she is able to exercise 3-5 times a week that she is doing okay at this point.  And there is no need for admission.  She also tells me she feels that it this is definitely her sciatica and I will discharge her on prednisone and anti-inflammatories    [EP]      ED Course User  "Index  [EP] Anjali Price, APRN       1318: Reviewed pt's history and workup with Dr. Abraham.  After a bedside evaluation, Dr. Abraham agrees with the plan of care.    1516: The patient's history, physical exam, and lab findings were discussed with the physician, who also performed a face to face history and physical exam.  I discussed all results and noted any abnormalities with patient.  Discussed absoute need to recheck abnormalities with their family physician.  I answered any of the patient's questions.  Discussed plan for discharge, as there is no emergent indication for admission.  Pt is agreeable and understands need for follow up and repeat testing.  Pt is aware that discharge does not mean that nothing is wrong but it indicates no emergency is present and they must continue care with their family physician.  Pt is discharged with instructions to follow up with primary care doctor to have their blood pressure rechecked.     Disposition vitals:  /71   Pulse (!) 48   Temp 97.3 °F (36.3 °C) (Tympanic)   Resp 15   Ht 167.6 cm (66\")   Wt 88 kg (194 lb)   SpO2 99%   BMI 31.31 kg/m²       DIAGNOSIS  Final diagnoses:   Lumbar strain, initial encounter       FOLLOW UP   Imani Dumont MD  [address] [address]  [phone]    Schedule an appointment as soon as possible for a visit in 1 week           Anjali Price, APRN  02/08/20 1724    "

## 2020-02-10 NOTE — PROGRESS NOTES
Please notify patient that blood count looks stable with normal hemoglobin level and no elevation in white blood cell count. Metabolic panel looks stable as well. B12 is normal but vitamin D is quite low. Please send in 50,000 units weekly of vitamin D x 8 weeks and order repeat vitamin D level for two months. Thyroid labs are normal.

## 2020-02-11 ENCOUNTER — TELEPHONE (OUTPATIENT)
Dept: INTERNAL MEDICINE | Facility: CLINIC | Age: 55
End: 2020-02-11

## 2020-02-11 RX ORDER — ERGOCALCIFEROL 1.25 MG/1
50000 CAPSULE ORAL WEEKLY
Qty: 12 CAPSULE | Refills: 1 | Status: SHIPPED | OUTPATIENT
Start: 2020-02-11 | End: 2020-08-03

## 2020-02-11 NOTE — TELEPHONE ENCOUNTER
----- Message from CASSIE Hester sent at 2/10/2020 10:25 AM EST -----  Please notify patient that blood count looks stable with normal hemoglobin level and no elevation in white blood cell count. Metabolic panel looks stable as well. B12 is normal but vitamin D is quite low. Please send in 50,000 units weekly of vitamin   D x 8 weeks and order repeat vitamin D level for two months. Thyroid labs are normal.

## 2020-02-29 DIAGNOSIS — R10.13 EPIGASTRIC PAIN: ICD-10-CM

## 2020-02-29 DIAGNOSIS — R11.0 NAUSEA: ICD-10-CM

## 2020-02-29 DIAGNOSIS — R14.2 BELCHING: ICD-10-CM

## 2020-03-02 RX ORDER — PANTOPRAZOLE SODIUM 40 MG/1
TABLET, DELAYED RELEASE ORAL
Qty: 30 TABLET | Refills: 1 | Status: SHIPPED | OUTPATIENT
Start: 2020-03-02 | End: 2020-08-03

## 2020-03-25 ENCOUNTER — TELEPHONE (OUTPATIENT)
Dept: INTERNAL MEDICINE | Facility: CLINIC | Age: 55
End: 2020-03-25

## 2020-03-25 RX ORDER — ESTRADIOL 0.5 MG/1
0.5 TABLET ORAL DAILY
Qty: 90 TABLET | Refills: 0 | Status: SHIPPED | OUTPATIENT
Start: 2020-03-25 | End: 2020-06-23

## 2020-03-25 NOTE — TELEPHONE ENCOUNTER
----- Message from Imani Mazariegos sent at 3/25/2020  1:16 PM EDT -----  Regarding: Prescription Question  Contact: [phone]  My insurance is changing pharmacies. I have to use [facility] in [facility] [phone].   I am almost out of my Estradiol. Please call a 3 months supply in.  Thank you so much

## 2020-06-01 DIAGNOSIS — G40.802 OTHER EPILEPSY WITHOUT STATUS EPILEPTICUS, NOT INTRACTABLE (HCC): Primary | ICD-10-CM

## 2020-06-01 RX ORDER — GABAPENTIN 300 MG/1
CAPSULE ORAL
Qty: 120 CAPSULE | Refills: 0 | Status: SHIPPED | OUTPATIENT
Start: 2020-06-01 | End: 2020-07-30

## 2020-06-23 RX ORDER — ESTRADIOL 0.5 MG/1
0.5 TABLET ORAL DAILY
Qty: 90 TABLET | Refills: 0 | Status: SHIPPED | OUTPATIENT
Start: 2020-06-23 | End: 2020-09-21

## 2020-07-29 DIAGNOSIS — G40.802 OTHER EPILEPSY WITHOUT STATUS EPILEPTICUS, NOT INTRACTABLE (HCC): ICD-10-CM

## 2020-07-30 RX ORDER — GABAPENTIN 300 MG/1
CAPSULE ORAL
Qty: 120 CAPSULE | Refills: 0 | Status: SHIPPED | OUTPATIENT
Start: 2020-07-30 | End: 2020-12-17

## 2020-08-03 ENCOUNTER — OFFICE VISIT (OUTPATIENT)
Dept: INTERNAL MEDICINE | Facility: CLINIC | Age: 55
End: 2020-08-03

## 2020-08-03 VITALS
WEIGHT: 200 LBS | OXYGEN SATURATION: 98 % | HEIGHT: 66 IN | HEART RATE: 55 BPM | DIASTOLIC BLOOD PRESSURE: 74 MMHG | SYSTOLIC BLOOD PRESSURE: 116 MMHG | BODY MASS INDEX: 32.14 KG/M2

## 2020-08-03 DIAGNOSIS — R25.1 TREMOR, UNSPECIFIED: Primary | ICD-10-CM

## 2020-08-03 PROCEDURE — 99212 OFFICE O/P EST SF 10 MIN: CPT | Performed by: INTERNAL MEDICINE

## 2020-08-03 NOTE — PROGRESS NOTES
"Chief Complaint   Patient presents with   • Follow-up     discuss somethings   • Tremors       Subjective   Imani Mazariegos is a 55 y.o. female.     History of Present Illness     She has been told by her employer that she needs a Tdap vaccine.  She is wondering about stopping Neurontin.  She has been taking this for internal tremors.  She took an extra dose a couple of times recently.  Therefore, she ran out before she could refill it.  She has now been off of it for about 3 days.  She does not think the internal tremors are any worse.  She always feels jumpy.  There is been no change in that.  She wakes up during the night with a shaky feeling in her body.  This has not changed.  She cut back on caffeine a long time ago on the chance that was contributing.      The following portions of the patient's history were reviewed and updated as appropriate: allergies, current medications, past family history, past medical history, past social history, past surgical history and problem list.    Review of Systems   Constitutional: Negative for appetite change and fatigue.   Cardiovascular: Negative for palpitations.         Current Outpatient Medications:   •  estradiol (ESTRACE) 0.5 MG tablet, TAKE 1 TABLET BY MOUTH DAILY, Disp: 90 tablet, Rfl: 0  •  gabapentin (NEURONTIN) 300 MG capsule, TAKE ONE CAPSULE BY MOUTH TWICE DAILY, Disp: 120 capsule, Rfl: 0        Objective     /74   Pulse 55   Ht 167.6 cm (66\")   Wt 90.7 kg (200 lb)   SpO2 98%   BMI 32.28 kg/m²     Physical Exam   Constitutional: She is oriented to person, place, and time. She appears well-developed and well-nourished. No distress.   Neck: Normal carotid pulses present. Carotid bruit is not present.   Cardiovascular: Regular rhythm, S1 normal and S2 normal. Exam reveals no gallop and no friction rub.   No murmur heard.  Pulses:       Carotid pulses are 2+ on the right side, and 2+ on the left side.  Pulmonary/Chest: Effort normal and breath sounds " normal. She has no wheezes. She has no rhonchi. She has no rales. Chest wall is not dull to percussion.   Musculoskeletal: She exhibits no edema.   Neurological: She is alert and oriented to person, place, and time.   Skin: Skin is warm and dry.   Nursing note and vitals reviewed.        Assessment/Plan   Imani was seen today for follow-up and tremors.    Diagnoses and all orders for this visit:    Tremor, unspecified    Advised she could stop Neurontin.  Advised her we administered Tdap in March 2019.           Answers for HPI/ROS submitted by the patient on 8/2/2020   What is the primary reason for your visit?: Physical

## 2020-09-21 RX ORDER — ESTRADIOL 0.5 MG/1
0.5 TABLET ORAL DAILY
Qty: 90 TABLET | Refills: 0 | Status: SHIPPED | OUTPATIENT
Start: 2020-09-21 | End: 2020-12-21

## 2020-11-10 ENCOUNTER — APPOINTMENT (OUTPATIENT)
Dept: WOMENS IMAGING | Facility: HOSPITAL | Age: 55
End: 2020-11-10

## 2020-11-10 PROCEDURE — 77067 SCR MAMMO BI INCL CAD: CPT | Performed by: RADIOLOGY

## 2020-11-10 PROCEDURE — 77063 BREAST TOMOSYNTHESIS BI: CPT | Performed by: RADIOLOGY

## 2020-11-16 DIAGNOSIS — R92.8 ABNORMAL MAMMOGRAM OF BOTH BREASTS: Primary | ICD-10-CM

## 2020-12-17 ENCOUNTER — APPOINTMENT (OUTPATIENT)
Dept: WOMENS IMAGING | Facility: HOSPITAL | Age: 55
End: 2020-12-17

## 2020-12-17 ENCOUNTER — TELEMEDICINE (OUTPATIENT)
Dept: INTERNAL MEDICINE | Facility: CLINIC | Age: 55
End: 2020-12-17

## 2020-12-17 DIAGNOSIS — R11.0 NAUSEA: ICD-10-CM

## 2020-12-17 DIAGNOSIS — K57.92 DIVERTICULITIS: Primary | ICD-10-CM

## 2020-12-17 PROCEDURE — 77066 DX MAMMO INCL CAD BI: CPT | Performed by: RADIOLOGY

## 2020-12-17 PROCEDURE — G0279 TOMOSYNTHESIS, MAMMO: HCPCS | Performed by: RADIOLOGY

## 2020-12-17 PROCEDURE — 76641 ULTRASOUND BREAST COMPLETE: CPT | Performed by: RADIOLOGY

## 2020-12-17 PROCEDURE — 77062 BREAST TOMOSYNTHESIS BI: CPT | Performed by: RADIOLOGY

## 2020-12-17 PROCEDURE — 99213 OFFICE O/P EST LOW 20 MIN: CPT | Performed by: NURSE PRACTITIONER

## 2020-12-17 RX ORDER — ONDANSETRON 4 MG/1
4 TABLET, ORALLY DISINTEGRATING ORAL EVERY 8 HOURS PRN
Qty: 20 TABLET | Refills: 0 | Status: SHIPPED | OUTPATIENT
Start: 2020-12-17 | End: 2021-10-13

## 2020-12-17 RX ORDER — SULFAMETHOXAZOLE AND TRIMETHOPRIM 800; 160 MG/1; MG/1
1 TABLET ORAL 2 TIMES DAILY
Qty: 14 TABLET | Refills: 0 | Status: SHIPPED | OUTPATIENT
Start: 2020-12-17 | End: 2020-12-24

## 2020-12-17 RX ORDER — METRONIDAZOLE 500 MG/1
500 TABLET ORAL 3 TIMES DAILY
Qty: 21 TABLET | Refills: 0 | Status: SHIPPED | OUTPATIENT
Start: 2020-12-17 | End: 2020-12-24

## 2020-12-17 NOTE — PROGRESS NOTES
Subjective     Imani Mazariegos is a 55 y.o. female.         She presents for MyChart video visit for nausea, constipation, abdominal cramping after eating, decreased appetite for about the last week.  Patient has a past medical history of diverticulitis and symptoms are usually present this exact same way.  She also reports that she has had low-grade fevers off and on for the last week of about 99 °F.  She is also having mild chills.  Denies cough, shortness of breath, loss of taste or smell.         The following portions of the patient's history were reviewed and updated as appropriate: allergies, current medications, past social history and problem list.    Review of Systems   Constitutional: Positive for fatigue. Negative for fever.   Respiratory: Negative for cough and shortness of breath.    Cardiovascular: Negative for chest pain.   Gastrointestinal: Positive for constipation and nausea. Negative for abdominal pain (abdominal cramping-generalized), blood in stool and diarrhea.   Neurological: Negative for dizziness.       Objective     There were no vitals taken for this visit.    Physical Exam  Constitutional:       General: She is not in acute distress.     Appearance: Normal appearance. She is not ill-appearing, toxic-appearing or diaphoretic.   Pulmonary:      Effort: Pulmonary effort is normal. No respiratory distress.   Skin:     General: Skin is dry.   Neurological:      Mental Status: She is alert and oriented to person, place, and time.   Psychiatric:         Mood and Affect: Mood normal.         Thought Content: Thought content normal.         Judgment: Judgment normal.         Assessment/Plan     Diagnoses and all orders for this visit:    1. Diverticulitis (Primary)  -     metroNIDAZOLE (Flagyl) 500 MG tablet; Take 1 tablet by mouth 3 (Three) Times a Day for 7 days.  Dispense: 21 tablet; Refill: 0  -     sulfamethoxazole-trimethoprim (BACTRIM DS,SEPTRA DS) 800-160 MG per tablet; Take 1 tablet by  mouth 2 (Two) Times a Day for 7 days.  Dispense: 14 tablet; Refill: 0    2. Nausea  -     ondansetron ODT (Zofran ODT) 4 MG disintegrating tablet; Place 1 tablet on the tongue Every 8 (Eight) Hours As Needed for Nausea or Vomiting.  Dispense: 20 tablet; Refill: 0    Follow bland diet.     Contact office for worsening of sx.

## 2020-12-21 RX ORDER — ESTRADIOL 0.5 MG/1
0.5 TABLET ORAL DAILY
Qty: 90 TABLET | Refills: 0 | Status: SHIPPED | OUTPATIENT
Start: 2020-12-21 | End: 2021-03-30 | Stop reason: SDUPTHER

## 2021-01-25 NOTE — PROGRESS NOTES
"Chief Complaint   Patient presents with   • Nausea   Diverticulitis          History of Present Illness  Patient was treated for diverticulitis in December 2020. Complains of nausea after meals.  Also with GERD and occasional constipation.          Result Review :      CT Abdomen Pelvis Stone Protocol (02/08/2020 13:27)  CBC & Differential (02/08/2020 13:10)  Comprehensive metabolic panel (02/07/2020 12:39)      Vital Signs:   /74   Pulse 96   Temp 97.6 °F (36.4 °C) (Temporal)   Resp 16   Ht 167.6 cm (66\")   Wt 90.4 kg (199 lb 6.4 oz)   SpO2 98%   BMI 32.18 kg/m²     Body mass index is 32.18 kg/m².     Physical Exam  Vitals signs reviewed.   Constitutional:       Appearance: Normal appearance.   Abdominal:      General: Bowel sounds are normal. There is no distension.      Palpations: Abdomen is soft. Abdomen is not rigid. There is no mass or pulsatile mass.      Tenderness: There is abdominal tenderness in the epigastric area. There is no guarding or rebound.             Assessment and Plan      Diagnoses and all orders for this visit:    1. Nausea (Primary)  Comments:  new and undiagnosed problem    2. Diverticulitis    3. Epigastric pain  Comments:  new and undiagnosed problem    4. Gastroesophageal reflux disease, unspecified whether esophagitis present  Comments:  No trouble swallowing.         Brief Summary  Will proceed with EGD to evaluate new nausea and worsening GERD and proceed with colonoscopy to follow up after diverticulitis.            Follow Up   No follow-ups on file.     There are no Patient Instructions on file for this visit.      "

## 2021-01-26 ENCOUNTER — PREP FOR SURGERY (OUTPATIENT)
Dept: SURGERY | Facility: SURGERY CENTER | Age: 56
End: 2021-01-26

## 2021-01-26 ENCOUNTER — OFFICE VISIT (OUTPATIENT)
Dept: GASTROENTEROLOGY | Facility: CLINIC | Age: 56
End: 2021-01-26

## 2021-01-26 ENCOUNTER — PREP FOR SURGERY (OUTPATIENT)
Dept: OTHER | Facility: HOSPITAL | Age: 56
End: 2021-01-26

## 2021-01-26 VITALS
RESPIRATION RATE: 16 BRPM | BODY MASS INDEX: 32.05 KG/M2 | OXYGEN SATURATION: 98 % | SYSTOLIC BLOOD PRESSURE: 116 MMHG | WEIGHT: 199.4 LBS | TEMPERATURE: 97.6 F | HEART RATE: 96 BPM | DIASTOLIC BLOOD PRESSURE: 74 MMHG | HEIGHT: 66 IN

## 2021-01-26 DIAGNOSIS — K57.92 DIVERTICULITIS: ICD-10-CM

## 2021-01-26 DIAGNOSIS — R11.0 NAUSEA: Primary | ICD-10-CM

## 2021-01-26 DIAGNOSIS — K21.9 GASTROESOPHAGEAL REFLUX DISEASE, UNSPECIFIED WHETHER ESOPHAGITIS PRESENT: Chronic | ICD-10-CM

## 2021-01-26 DIAGNOSIS — R10.13 EPIGASTRIC PAIN: ICD-10-CM

## 2021-01-26 PROCEDURE — 99204 OFFICE O/P NEW MOD 45 MIN: CPT | Performed by: INTERNAL MEDICINE

## 2021-02-04 ENCOUNTER — TELEPHONE (OUTPATIENT)
Dept: GASTROENTEROLOGY | Facility: CLINIC | Age: 56
End: 2021-02-04

## 2021-02-04 NOTE — TELEPHONE ENCOUNTER
Patient states that she is nauseated, belching, and bloating. Stomach pain in left upper area, with radiating pain to her back.  She states that when she gets up, she doesn't have any energy. She wants to know if she can move up her EGD & CLS.   Please advise.

## 2021-02-05 ENCOUNTER — LAB (OUTPATIENT)
Dept: LAB | Facility: SURGERY CENTER | Age: 56
End: 2021-02-05

## 2021-02-05 ENCOUNTER — TRANSCRIBE ORDERS (OUTPATIENT)
Dept: SURGERY | Facility: SURGERY CENTER | Age: 56
End: 2021-02-05

## 2021-02-05 DIAGNOSIS — Z01.818 OTHER SPECIFIED PRE-OPERATIVE EXAMINATION: Primary | ICD-10-CM

## 2021-02-05 DIAGNOSIS — Z01.818 OTHER SPECIFIED PRE-OPERATIVE EXAMINATION: ICD-10-CM

## 2021-02-05 LAB — SARS-COV-2 ORF1AB RESP QL NAA+PROBE: NOT DETECTED

## 2021-02-05 PROCEDURE — C9803 HOPD COVID-19 SPEC COLLECT: HCPCS

## 2021-02-05 PROCEDURE — U0004 COV-19 TEST NON-CDC HGH THRU: HCPCS | Performed by: SURGERY

## 2021-02-05 NOTE — PRE-PROCEDURE INSTRUCTIONS
Education provided the Patient on the following:    - Nothing to Eat or Drink after MN the night before the procedure  -Your required COVID Test is Scheduled on 2/5/21 Between the Hours of 4720-3894  -You will only be notified if your COVID test Result is POSITIVE  -The importance of reducing your number of contacts by self quarantining after you COVID test until the date of your Colonoscopy/ EGD    - Avoid red/purple fluids while completing their bowel prep as ordered by physician  -Contact Gastrointerologist office for any questions about specific details regarding colon prep    -You will need to have someone drive you home after your colonoscopy/egd and remain with you for 24 hours after the procedure  - The date of your Surgery, your ride is welcome to either remain in our parking lot or within 10-15 minutes of [facility]  -Please wear warm socks when you arrive for your egd/colonoscopy  -Remove all jewelry and leave any valuables before arriving the day of your procedure (all will have to be removed before leaving preop)  -You will need to arrive at 1030  on 2/8/21 for your egd/colonoscopy    -Feel free to contact us at: [phone] with any additional questions/concerns          Has the patient ever tested Positive COVID?

## 2021-02-08 ENCOUNTER — HOSPITAL ENCOUNTER (OUTPATIENT)
Facility: SURGERY CENTER | Age: 56
Setting detail: HOSPITAL OUTPATIENT SURGERY
Discharge: HOME | End: 2021-02-08
Attending: INTERNAL MEDICINE | Admitting: INTERNAL MEDICINE

## 2021-02-08 ENCOUNTER — ANESTHESIA EVENT (OUTPATIENT)
Dept: SURGERY | Facility: SURGERY CENTER | Age: 56
End: 2021-02-08

## 2021-02-08 ENCOUNTER — LAB (OUTPATIENT)
Dept: LAB | Facility: HOSPITAL | Age: 56
End: 2021-02-08

## 2021-02-08 ENCOUNTER — ANESTHESIA (OUTPATIENT)
Dept: SURGERY | Facility: SURGERY CENTER | Age: 56
End: 2021-02-08

## 2021-02-08 VITALS
SYSTOLIC BLOOD PRESSURE: 110 MMHG | BODY MASS INDEX: 31.18 KG/M2 | TEMPERATURE: 98 F | OXYGEN SATURATION: 98 % | RESPIRATION RATE: 16 BRPM | WEIGHT: 194 LBS | HEART RATE: 48 BPM | DIASTOLIC BLOOD PRESSURE: 81 MMHG | HEIGHT: 66 IN

## 2021-02-08 DIAGNOSIS — R11.0 NAUSEA: ICD-10-CM

## 2021-02-08 DIAGNOSIS — K57.12: ICD-10-CM

## 2021-02-08 PROCEDURE — 88305 TISSUE EXAM BY PATHOLOGIST: CPT | Performed by: INTERNAL MEDICINE

## 2021-02-08 PROCEDURE — 43239 EGD BIOPSY SINGLE/MULTIPLE: CPT | Performed by: INTERNAL MEDICINE

## 2021-02-08 PROCEDURE — 45385 COLONOSCOPY W/LESION REMOVAL: CPT | Performed by: INTERNAL MEDICINE

## 2021-02-08 PROCEDURE — 25010000002 ONDANSETRON PER 1 MG: Performed by: ANESTHESIOLOGY

## 2021-02-08 PROCEDURE — 25010000002 PROPOFOL 10 MG/ML EMULSION: Performed by: ANESTHESIOLOGY

## 2021-02-08 RX ORDER — LIDOCAINE HYDROCHLORIDE 20 MG/ML
INJECTION, SOLUTION INFILTRATION; PERINEURAL AS NEEDED
Status: DISCONTINUED | OUTPATIENT
Start: 2021-02-08 | End: 2021-02-08 | Stop reason: SURG

## 2021-02-08 RX ORDER — MIDAZOLAM HYDROCHLORIDE 1 MG/ML
2 INJECTION INTRAMUSCULAR; INTRAVENOUS
Status: DISCONTINUED | OUTPATIENT
Start: 2021-02-08 | End: 2021-02-08 | Stop reason: HOSPADM

## 2021-02-08 RX ORDER — SODIUM CHLORIDE 0.9 % (FLUSH) 0.9 %
10 SYRINGE (ML) INJECTION EVERY 12 HOURS SCHEDULED
Status: DISCONTINUED | OUTPATIENT
Start: 2021-02-08 | End: 2021-02-08 | Stop reason: HOSPADM

## 2021-02-08 RX ORDER — SODIUM CHLORIDE, SODIUM LACTATE, POTASSIUM CHLORIDE, CALCIUM CHLORIDE 600; 310; 30; 20 MG/100ML; MG/100ML; MG/100ML; MG/100ML
30 INJECTION, SOLUTION INTRAVENOUS CONTINUOUS PRN
Status: DISCONTINUED | OUTPATIENT
Start: 2021-02-08 | End: 2021-02-08 | Stop reason: HOSPADM

## 2021-02-08 RX ORDER — FENTANYL CITRATE 50 UG/ML
25 INJECTION, SOLUTION INTRAMUSCULAR; INTRAVENOUS
Status: DISCONTINUED | OUTPATIENT
Start: 2021-02-08 | End: 2021-02-08 | Stop reason: HOSPADM

## 2021-02-08 RX ORDER — SODIUM CHLORIDE 0.9 % (FLUSH) 0.9 %
3 SYRINGE (ML) INJECTION EVERY 12 HOURS SCHEDULED
Status: DISCONTINUED | OUTPATIENT
Start: 2021-02-08 | End: 2021-02-08 | Stop reason: HOSPADM

## 2021-02-08 RX ORDER — HYDRALAZINE HYDROCHLORIDE 20 MG/ML
10 INJECTION INTRAMUSCULAR; INTRAVENOUS
Status: DISCONTINUED | OUTPATIENT
Start: 2021-02-08 | End: 2021-02-08 | Stop reason: HOSPADM

## 2021-02-08 RX ORDER — SODIUM CHLORIDE, SODIUM LACTATE, POTASSIUM CHLORIDE, CALCIUM CHLORIDE 600; 310; 30; 20 MG/100ML; MG/100ML; MG/100ML; MG/100ML
9 INJECTION, SOLUTION INTRAVENOUS CONTINUOUS PRN
Status: DISCONTINUED | OUTPATIENT
Start: 2021-02-08 | End: 2021-02-08 | Stop reason: HOSPADM

## 2021-02-08 RX ORDER — SODIUM CHLORIDE 0.9 % (FLUSH) 0.9 %
10 SYRINGE (ML) INJECTION AS NEEDED
Status: DISCONTINUED | OUTPATIENT
Start: 2021-02-08 | End: 2021-02-08 | Stop reason: HOSPADM

## 2021-02-08 RX ORDER — ONDANSETRON 2 MG/ML
4 INJECTION INTRAMUSCULAR; INTRAVENOUS ONCE AS NEEDED
Status: DISCONTINUED | OUTPATIENT
Start: 2021-02-08 | End: 2021-02-08 | Stop reason: HOSPADM

## 2021-02-08 RX ORDER — ONDANSETRON 2 MG/ML
INJECTION INTRAMUSCULAR; INTRAVENOUS AS NEEDED
Status: DISCONTINUED | OUTPATIENT
Start: 2021-02-08 | End: 2021-02-08 | Stop reason: SURG

## 2021-02-08 RX ORDER — PROPOFOL 10 MG/ML
VIAL (ML) INTRAVENOUS AS NEEDED
Status: DISCONTINUED | OUTPATIENT
Start: 2021-02-08 | End: 2021-02-08 | Stop reason: SURG

## 2021-02-08 RX ORDER — MAGNESIUM HYDROXIDE 1200 MG/15ML
LIQUID ORAL AS NEEDED
Status: DISCONTINUED | OUTPATIENT
Start: 2021-02-08 | End: 2021-02-08 | Stop reason: HOSPADM

## 2021-02-08 RX ORDER — LABETALOL HYDROCHLORIDE 5 MG/ML
5 INJECTION, SOLUTION INTRAVENOUS
Status: DISCONTINUED | OUTPATIENT
Start: 2021-02-08 | End: 2021-02-08 | Stop reason: HOSPADM

## 2021-02-08 RX ADMIN — PROPOFOL 150 MG: 10 INJECTION, EMULSION INTRAVENOUS at 12:09

## 2021-02-08 RX ADMIN — ONDANSETRON 4 MG: 2 SOLUTION INTRAMUSCULAR; INTRAVENOUS at 12:15

## 2021-02-08 RX ADMIN — SODIUM CHLORIDE, POTASSIUM CHLORIDE, SODIUM LACTATE AND CALCIUM CHLORIDE 9 ML/HR: 600; 310; 30; 20 INJECTION, SOLUTION INTRAVENOUS at 10:54

## 2021-02-08 RX ADMIN — PROPOFOL 200 MCG/KG/MIN: 10 INJECTION, EMULSION INTRAVENOUS at 12:09

## 2021-02-08 RX ADMIN — LIDOCAINE HYDROCHLORIDE 60 MG: 20 INJECTION, SOLUTION INFILTRATION; PERINEURAL at 12:09

## 2021-02-08 NOTE — ANESTHESIA POSTPROCEDURE EVALUATION
"Patient: Imani Mazariegos    Procedure Summary     Date: 02/08/21 Room / Location: [facility]    Anesthesia Start: 1206 Anesthesia Stop: 1238    Procedures:       ESOPHAGOGASTRODUODENOSCOPY w/ biopsy (N/A )      COLONOSCOPY w/ polypectomy (N/A ) Diagnosis:       Nausea      Diverticulitis of small intestine      (Nausea [R11.0])      (Diverticulitis of small intestine [K57.12])    Surgeon: Kilo Macedo MD Provider: Yunior Bello MD    Anesthesia Type: MAC ASA Status: 3          Anesthesia Type: MAC    Vitals  No vitals data found for the desired time range.          Post Anesthesia Care and Evaluation    Patient location during evaluation: bedside  Patient participation: complete - patient participated  Level of consciousness: sleepy but conscious  Pain management: adequate  Airway patency: patent  Anesthetic complications: No anesthetic complications    Cardiovascular status: acceptable  Respiratory status: acceptable  Hydration status: acceptable    Comments: */78 (BP Location: Left arm, Patient Position: Lying)   Pulse 56   Temp 37.2 °C (98.9 °F) (Temporal)   Resp 16   Ht 167.6 cm (66\")   Wt 88 kg (194 lb)   SpO2 96%   BMI 31.31 kg/m²         "

## 2021-02-08 NOTE — ANESTHESIA PREPROCEDURE EVALUATION
Anesthesia Evaluation     history of anesthetic complications (questionable seizure-like activity on emergence):  NPO Solid Status: > 8 hours  NPO Liquid Status: > 2 hours           Airway   Mallampati: I  Neck ROM: full  no difficulty expected  Dental - normal exam     Pulmonary - normal exam   (+) shortness of breath,   (-) COPD, asthma, sleep apnea, not a smoker    PE comment: nonlabored  Cardiovascular - normal exam    Rhythm: regular  Rate: normal    (+) dysrhythmias Bradycardia, hyperlipidemia,   (-) hypertension, valvular problems/murmurs, past MI, CAD, angina      Neuro/Psych  (+) seizures (partial seizures--absence type usually; last ~1yr ago),     (-) TIA, CVA  GI/Hepatic/Renal/Endo    (+) obesity,     (-) GERD, liver disease, no renal disease, diabetes, no thyroid disorder    Musculoskeletal     (+) back pain, chronic pain,   Abdominal    Substance History      OB/GYN          Other                      Anesthesia Plan    ASA 3     MAC       Anesthetic plan, all risks, benefits, and alternatives have been provided, discussed and informed consent has been obtained with: patient.

## 2021-02-09 LAB
CYTO UR: NORMAL
LAB AP CASE REPORT: NORMAL
LAB AP CLINICAL INFORMATION: NORMAL
PATH REPORT.FINAL DX SPEC: NORMAL
PATH REPORT.GROSS SPEC: NORMAL

## 2021-02-22 ENCOUNTER — HOSPITAL ENCOUNTER (EMERGENCY)
Facility: HOSPITAL | Age: 56
Discharge: HOME | End: 2021-02-22
Attending: EMERGENCY MEDICINE | Admitting: EMERGENCY MEDICINE

## 2021-02-22 ENCOUNTER — APPOINTMENT (OUTPATIENT)
Dept: CT IMAGING | Facility: HOSPITAL | Age: 56
End: 2021-02-22

## 2021-02-22 ENCOUNTER — TELEPHONE (OUTPATIENT)
Dept: INTERNAL MEDICINE | Facility: CLINIC | Age: 56
End: 2021-02-22

## 2021-02-22 VITALS
WEIGHT: 198 LBS | HEIGHT: 66 IN | TEMPERATURE: 97.5 F | SYSTOLIC BLOOD PRESSURE: 126 MMHG | DIASTOLIC BLOOD PRESSURE: 84 MMHG | OXYGEN SATURATION: 97 % | RESPIRATION RATE: 16 BRPM | HEART RATE: 60 BPM | BODY MASS INDEX: 31.82 KG/M2

## 2021-02-22 DIAGNOSIS — R25.1 TREMORS OF NERVOUS SYSTEM: ICD-10-CM

## 2021-02-22 DIAGNOSIS — R51.9 SLEEP RELATED HEADACHES: ICD-10-CM

## 2021-02-22 DIAGNOSIS — I10 ESSENTIAL HYPERTENSION: Primary | ICD-10-CM

## 2021-02-22 LAB
ALBUMIN SERPL-MCNC: 4.3 G/DL (ref 3.5–5.2)
ALBUMIN/GLOB SERPL: 1.7 G/DL
ALP SERPL-CCNC: 65 U/L (ref 39–117)
ALT SERPL W P-5'-P-CCNC: 27 U/L (ref 1–33)
ANION GAP SERPL CALCULATED.3IONS-SCNC: 9.3 MMOL/L (ref 5–15)
AST SERPL-CCNC: 29 U/L (ref 1–32)
BASOPHILS # BLD AUTO: 0.03 10*3/MM3 (ref 0–0.2)
BASOPHILS NFR BLD AUTO: 0.5 % (ref 0–1.5)
BILIRUB SERPL-MCNC: 0.3 MG/DL (ref 0–1.2)
BUN SERPL-MCNC: 15 MG/DL (ref 6–20)
BUN/CREAT SERPL: 17.6 (ref 7–25)
CALCIUM SPEC-SCNC: 9.1 MG/DL (ref 8.6–10.5)
CHLORIDE SERPL-SCNC: 107 MMOL/L (ref 98–107)
CO2 SERPL-SCNC: 23.7 MMOL/L (ref 22–29)
CREAT SERPL-MCNC: 0.85 MG/DL (ref 0.57–1)
DEPRECATED RDW RBC AUTO: 43.9 FL (ref 37–54)
EOSINOPHIL # BLD AUTO: 0.07 10*3/MM3 (ref 0–0.4)
EOSINOPHIL NFR BLD AUTO: 1.2 % (ref 0.3–6.2)
ERYTHROCYTE [DISTWIDTH] IN BLOOD BY AUTOMATED COUNT: 12.6 % (ref 12.3–15.4)
GFR SERPL CREATININE-BSD FRML MDRD: 69 ML/MIN/1.73
GLOBULIN UR ELPH-MCNC: 2.5 GM/DL
GLUCOSE SERPL-MCNC: 101 MG/DL (ref 65–99)
HCT VFR BLD AUTO: 46.1 % (ref 34–46.6)
HGB BLD-MCNC: 15.2 G/DL (ref 12–15.9)
HOLD SPECIMEN: NORMAL
HOLD SPECIMEN: NORMAL
IMM GRANULOCYTES # BLD AUTO: 0.02 10*3/MM3 (ref 0–0.05)
IMM GRANULOCYTES NFR BLD AUTO: 0.3 % (ref 0–0.5)
LYMPHOCYTES # BLD AUTO: 1.69 10*3/MM3 (ref 0.7–3.1)
LYMPHOCYTES NFR BLD AUTO: 28.5 % (ref 19.6–45.3)
MCH RBC QN AUTO: 31.3 PG (ref 26.6–33)
MCHC RBC AUTO-ENTMCNC: 33 G/DL (ref 31.5–35.7)
MCV RBC AUTO: 95.1 FL (ref 79–97)
MONOCYTES # BLD AUTO: 0.7 10*3/MM3 (ref 0.1–0.9)
MONOCYTES NFR BLD AUTO: 11.8 % (ref 5–12)
NEUTROPHILS NFR BLD AUTO: 3.41 10*3/MM3 (ref 1.7–7)
NEUTROPHILS NFR BLD AUTO: 57.7 % (ref 42.7–76)
NRBC BLD AUTO-RTO: 0 /100 WBC (ref 0–0.2)
PLATELET # BLD AUTO: 240 10*3/MM3 (ref 140–450)
PMV BLD AUTO: 9.9 FL (ref 6–12)
POTASSIUM SERPL-SCNC: 4.2 MMOL/L (ref 3.5–5.2)
PROT SERPL-MCNC: 6.8 G/DL (ref 6–8.5)
RBC # BLD AUTO: 4.85 10*6/MM3 (ref 3.77–5.28)
SODIUM SERPL-SCNC: 140 MMOL/L (ref 136–145)
WBC # BLD AUTO: 5.92 10*3/MM3 (ref 3.4–10.8)
WHOLE BLOOD HOLD SPECIMEN: NORMAL
WHOLE BLOOD HOLD SPECIMEN: NORMAL

## 2021-02-22 PROCEDURE — 99283 EMERGENCY DEPT VISIT LOW MDM: CPT

## 2021-02-22 PROCEDURE — 85025 COMPLETE CBC W/AUTO DIFF WBC: CPT | Performed by: EMERGENCY MEDICINE

## 2021-02-22 PROCEDURE — 80053 COMPREHEN METABOLIC PANEL: CPT | Performed by: EMERGENCY MEDICINE

## 2021-02-22 PROCEDURE — 70450 CT HEAD/BRAIN W/O DYE: CPT

## 2021-02-22 NOTE — ED NOTES
"Pt called PCP about having headaches and \"tremor like\" symptoms in her head since October. States \"tremor started in my head and it goes down my body and wakes me up out of my sleep.\"     Job, CHAZ Leonardo  02/22/21 1826    "

## 2021-02-22 NOTE — ED PROVIDER NOTES
" EMERGENCY DEPARTMENT ENCOUNTER    CHIEF COMPLAINT  Chief Complaint: Tremors  History given by: Patient  History limited by: Nothing  Room Number: 23/23  PMD: Imani Dumont MD  Gastroenterologist t: Dr. Hi Meehan    HPI:  Pt is a 55 y.o. female presents complaining of 5-month history of \"tremors in her brain\" with intermittent left frontal headaches, sharp in nature lasting 10 seconds both of which occur at night, waking her in the small hours of the morning.  Patient reports nausea ongoing for several months, generalized fatigue that is worse after these episodes of shaking.  Patient reports trouble concentrating but denies new visual, speech disturbance, unilateral weakness or numbness.  Patient reports she has some chronic incontinence for years but that is unchanged recently.  Patient reports she woke yesterday morning with the tremor-like feeling in her brain and her right arm continued shaking for approximately 2 hours after waking.    Patient denies cough, fever, chest pain, abdominal pain, vomiting, changes in her urinary or bowel habits, swelling of extremities  Patient reports the symptoms became worse after discontinuing Neurontin which she had been on for years as agreed with her primary care provider who reports the patient was taking for  \"intention tremor\"    Duration: 5 months  Associated Symptoms: Intermittent headaches, shaking of her right upper extremity, nausea, generalized fatigue  Aggravating Factors: Unknown  Alleviating Factors: Unknown  Treatment before arrival: Regular meds    PAST MEDICAL HISTORY  Active Ambulatory Problems     Diagnosis Date Noted   • Symptomatic states associated with artificial menopause    • Abdominal pain, LLQ    • Abdominal pain, LUQ    • Back pain, lumbosacral    • Hyperlipidemia      Resolved Ambulatory Problems     Diagnosis Date Noted   • Epilepsy without status epilepticus, not intractable (CMS/[facility])      Past Medical History:   Diagnosis Date   • Bradycardia "    • Diverticulitis 2020   • Multiple falls    • Seizures (CMS/HCC)    • SOB (shortness of breath)        PAST SURGICAL HISTORY  Past Surgical History:   Procedure Laterality Date   • APPENDECTOMY     •  SECTION     • COLONOSCOPY      1 polyp, benign    • COLONOSCOPY N/A 2021    Procedure: COLONOSCOPY w/ polypectomy;  Surgeon: Kilo Macedo MD;  Location: [facility] MAIN OR;  Service: Gastroenterology;  Laterality: N/A;   • ENDOSCOPY N/A 2021    Procedure: ESOPHAGOGASTRODUODENOSCOPY w/ biopsy;  Surgeon: Kilo Macedo MD;  Location: [facility] MAIN OR;  Service: Gastroenterology;  Laterality: N/A;   • HYSTERECTOMY      SAMMI, BSO       FAMILY HISTORY  Family History   Problem Relation Age of Onset   • Sick sinus syndrome Mother    • Heart failure Mother    • Pernicious anemia Mother    • Hepatitis Mother         Hep C   • Other Mother         Peripheral neuropathy   • Colon polyps Mother    • Diverticulitis Mother    • Sick sinus syndrome Father         pacemaker   • Diabetes Maternal Grandmother    • Diabetes Paternal Grandmother    • No Known Problems Brother    • Stomach cancer Maternal Grandfather    • Colon cancer Neg Hx        SOCIAL HISTORY  Social History     Socioeconomic History   • Marital status:      Spouse name: Not on file   • Number of children: 1   • Years of education: BSN   • Highest education level: Not on file   Occupational History   • Occupation: RN   Tobacco Use   • Smoking status: Never Smoker   • Smokeless tobacco: Never Used   • Tobacco comment: caffeine use   Substance and Sexual Activity   • Alcohol use: No   • Drug use: No   • Sexual activity: Defer   Social History Narrative    LIVES WITH SPOUSE       ALLERGIES  Ciprofloxacin hcl    REVIEW OF SYSTEMS  Review of Systems   Constitutional: Positive for fatigue. Negative for chills and fever.   HENT: Negative for rhinorrhea and sore throat.    Eyes: Negative for visual disturbance.  "  Respiratory: Negative for cough and shortness of breath.    Cardiovascular: Negative for chest pain, palpitations and leg swelling.   Gastrointestinal: Positive for nausea. Negative for abdominal pain, diarrhea and vomiting.   Endocrine: Negative.    Genitourinary: Negative for decreased urine volume, dysuria and frequency.   Musculoskeletal: Negative for neck pain.   Skin: Negative for rash.   Neurological: Positive for tremors ( \"In my brain\" at night waking her from sleep at approximately 2 or 3 AM for the past 5 months ) and headaches ( Left frontal sharp pain lasting 10 seconds intermittent for 2 weeks). Negative for syncope.   Psychiatric/Behavioral: Positive for decreased concentration.   All other systems reviewed and are negative.      PHYSICAL EXAM  ED Triage Vitals [02/22/21 1406]   Temp Heart Rate Resp BP SpO2   96.6 °F (35.9 °C) 98 16 (!) 156/111 98 %      Temp src Heart Rate Source Patient Position BP Location FiO2 (%)   -- -- -- -- --       Physical Exam   Constitutional: She is oriented to person, place, and time.  Non-toxic appearance. She appears distressed (mild).   HENT:   Head: Normocephalic and atraumatic.   Eyes: EOM are normal.   Neck: Normal range of motion. Neck supple.   Cardiovascular: Normal rate, regular rhythm, normal heart sounds and intact distal pulses.   No murmur heard.  Pulses:       Posterior tibial pulses are 2+ on the right side and 2+ on the left side.   Pulmonary/Chest: Effort normal and breath sounds normal. No respiratory distress.   Abdominal: Soft. Bowel sounds are normal. There is no abdominal tenderness. There is no rebound and no guarding.   Musculoskeletal: Normal range of motion.         General: No edema.   Neurological: She is alert and oriented to person, place, and time.   Skin: Skin is warm and dry.   Psychiatric: Affect normal.   Nursing note and vitals reviewed.      LAB RESULTS  Lab Results (last 24 hours)     Procedure Component Value Units Date/Time    " CBC & Differential [[id number]]  (Normal) Collected: 02/22/21 1537    Specimen: Blood Updated: 02/22/21 1550    Narrative:      The following orders were created for panel order CBC & Differential.  Procedure                               Abnormality         Status                     ---------                               -----------         ------                     CBC Auto Differential[[id number]]        Normal              Final result                 Please view results for these tests on the individual orders.    Comprehensive Metabolic Panel [[id number]]  (Abnormal) Collected: 02/22/21 1537    Specimen: Blood Updated: 02/22/21 1612     Glucose 101 mg/dL      BUN 15 mg/dL      Creatinine 0.85 mg/dL      Sodium 140 mmol/L      Potassium 4.2 mmol/L      Comment: Slight hemolysis detected by analyzer. Results may be affected.        Chloride 107 mmol/L      CO2 23.7 mmol/L      Calcium 9.1 mg/dL      Total Protein 6.8 g/dL      Albumin 4.30 g/dL      ALT (SGPT) 27 U/L      AST (SGOT) 29 U/L      Comment: Slight hemolysis detected by analyzer. Results may be affected.        Alkaline Phosphatase 65 U/L      Total Bilirubin 0.3 mg/dL      eGFR Non African Amer 69 mL/min/1.73      Globulin 2.5 gm/dL      A/G Ratio 1.7 g/dL      BUN/Creatinine Ratio 17.6     Anion Gap 9.3 mmol/L     Narrative:      GFR Normal >60  Chronic Kidney Disease <60  Kidney Failure <15      CBC Auto Differential [[id number]]  (Normal) Collected: 02/22/21 1537    Specimen: Blood Updated: 02/22/21 1550     WBC 5.92 10*3/mm3      RBC 4.85 10*6/mm3      Hemoglobin 15.2 g/dL      Hematocrit 46.1 %      MCV 95.1 fL      MCH 31.3 pg      MCHC 33.0 g/dL      RDW 12.6 %      RDW-SD 43.9 fl      MPV 9.9 fL      Platelets 240 10*3/mm3      Neutrophil % 57.7 %      Lymphocyte % 28.5 %      Monocyte % 11.8 %      Eosinophil % 1.2 %      Basophil % 0.5 %      Immature Grans % 0.3 %      Neutrophils, Absolute 3.41 10*3/mm3      Lymphocytes, Absolute 1.69  10*3/mm3      Monocytes, Absolute 0.70 10*3/mm3      Eosinophils, Absolute 0.07 10*3/mm3      Basophils, Absolute 0.03 10*3/mm3      Immature Grans, Absolute 0.02 10*3/mm3      nRBC 0.0 /100 WBC           I ordered the above labs and reviewed the results    RADIOLOGY  CT Head Without Contrast   Final Result   No acute intracranial abnormality. If there is clinical concern for   acute infarction, this would be better assessed with MRI.       Discussed with Dr. Marie at 5:14 PM.       This report was finalized on 2/22/2021 5:14 PM by Dr. Anjali Felipe M.D.               I ordered the above noted radiological studies. Interpreted by radiologist. Viewed by me in PACS.       PROCEDURES  Procedures      PROGRESS AND CONSULTS  ED Course as of Feb 22 2051   Mon Feb 22, 2021   1730 Discussed with Dr. Felipe, radiology who reports patient CT head unremarkable for acute disease    [TO]      ED Course User Index  [TO] Nica Marie MD       Patient voices understanding of imaging and labs unremarkable in the ER and voices understanding of need to follow closely with Dr. Dumont and neurology specialist as needed for recheck and further testing, treatment as needed.    MEDICAL DECISION MAKING  Results were reviewed/discussed with the patient and they were also made aware of online access. Pt also made aware that some labs, such as cultures, will not be resulted during ER visit and follow up with PMD is necessary.       MDM       DIAGNOSIS  Final diagnoses:   Essential hypertension   Sleep related headaches   Tremors of nervous system       DISPOSITION  DISCHARGE    Patient discharged in stable condition.    Reviewed implications of results, diagnosis, meds, responsibility to follow up, warning signs and symptoms of possible worsening, potential complications and reasons to return to ER, including altered mental status, unilateral weakness or numbness, new visual or speech disturbance or other concerns.    Patient/Family voiced  understanding of above instructions.    Discussed plan for discharge, as there is no emergent indication for admission. Patient referred to primary care provider for BP management due to today's BP. Pt/family is agreeable and understands need for follow up and repeat testing.  Pt is aware that discharge does not mean that nothing is wrong but it indicates no emergency is present that requires admission and they must continue care with follow-up as given below or physician of their choice.     FOLLOW-UP  Kandace Gates MD  [address] [facility] [facility] [facility] [address]  [place] [address] [phone]    Schedule an appointment as soon as possible for a visit in 1 week  EVEN IF WELL    Imani Dumont MD  [address] [facility] [address]  [place] [facility] [id number] [phone]    Schedule an appointment as soon as possible for a visit in 3 days  EVEN IF WELL         Medication List      No changes were made to your prescriptions during this visit.           Latest Documented Vital Signs:  As of 20:51 EST  BP- 126/84 HR- 60 Temp- 97.5 °F (36.4 °C) (Tympanic) O2 sat- 97%    --  Patient was wearing facemask when I entered the room and throughout our encounter. Full protective equipment was worn throughout this patient encounter including a face mask, eye protection and gloves. Hand hygiene was performed before donning protective equipment and after removal when leaving the room.      Nica Marie MD  02/22/21 2051

## 2021-02-22 NOTE — DISCHARGE INSTRUCTIONS
You are advised to follow closely with Dr. Dumont in 2-3 days and Dr. Gates or neurologic specialist of your choice in 1 to 2 weeks for evaluation of persistent symptoms for recheck, final results of lab work and imaging testing, and further testing/treatment as needed.    Low-sodium diet.  Hydrate well-drink at least 8 glasses of water daily.  Meds as previously directed.  Please consider discussing with Dr. Dumont benefits versus risks of using Neurontin once again for alleviation of your symptoms.    Do not drive, operate machinery, work at heights while having episodes of uncontrolled shaking    Please return to the emergency department immediately with chest pain different than usual for you, altered mental status shortness of air, abdominal pain, persistent vomiting/fever, blood in emesis or stool, lightheadedness/fainting, problems with speech, one sided weakness/numbness, new incontinence, problems with vision,  or for worsening of symptoms or other concerns.     129

## 2021-02-23 ENCOUNTER — TELEPHONE (OUTPATIENT)
Dept: GASTROENTEROLOGY | Facility: CLINIC | Age: 56
End: 2021-02-23

## 2021-02-23 NOTE — TELEPHONE ENCOUNTER
Patient recently had a CLS and was dx with GERD with Esophagitis and was told to start OTC Prilosec and continue Zofran . Patient stated she noticed one of the side effects could be headaches. Patient is very nausea, is having experiencing headaches oon a daily ,belching , and slight abdominal cramping . Patient just doesn't feel well , has a f/u appt. 3/10/2021 with you but would like to know what to do till than ? Please advise     Ms. Mazariegos [phone]

## 2021-02-24 NOTE — TELEPHONE ENCOUNTER
Please offer her a sooner appointment if she would like. She can stop the prilosec if it is causing headaches.

## 2021-02-24 NOTE — PATIENT INSTRUCTIONS
For GERD, follow antireflux precautions.  Recommend avoiding eating within 3 to 4 hours of bedtime.  Avoid foods that can trigger symptoms which may include citrus fruits, spicy foods, tomatoes and red sauces, chocolate, coffee/tea, caffeinated or carbonated beverages, alcohol.    For diverticulosis, follow a high-fiber diet.  Consider starting a daily fiber supplement, such as Metamucil or Citrucel, available over-the-counter.    For surveillance of colon polyps, colonoscopy recommended at 5-year interval, due February 2026.    For GERD, follow antireflux precautions.  Recommend avoiding eating within 3 to 4 hours of bedtime.  Avoid foods that can trigger symptoms which may include citrus fruits, spicy foods, tomatoes and red sauces, chocolate, coffee/tea, caffeinated or carbonated beverages, alcohol.    If symptoms do not improve over the next 4 weeks, please contact the office and we will schedule gastric emptying study as discussed for further evaluation of nausea.

## 2021-02-24 NOTE — PROGRESS NOTES
"Chief Complaint   Patient presents with   • GI Problem           History of Present Illness  Patient is a 55-year-old female who presents today for follow-up.     She was seen in the office January 2021 for diverticulitis, GERD, and nausea.    EGD and colonoscopy were performed February 2021.  EGD with esophagitis.  Colonoscopy with 2 adenomatous polyps, severe diverticulosis in the sigmoid and descending colon with evidence of an impacted diverticulum.  Duodenal biopsies were unremarkable, gastric biopsies negative for H. pylori.  GE junction biopsies negative for intestinal metaplasia.    Patient presents today for follow-up.  She reports she has experienced some persistent nausea.  It is present on a daily basis.  It waxes and wanes.  It is worse when she eats.  It can be associated with abdominal bloating.  She denies early satiety.  She is also noted increased belching.  She had been taking Prilosec however it seemed to cause headache and seem to worsen her nausea, she stopped taking it but questions if there is anything else she can take.    She is also been experiencing alternating bowel habits.  Alternating between constipation and loose stools.  She tried a stool softener.  She has had no further issues with diverticulitis.  She denies any abdominal pain.    Review of Systems   Constitutional: Negative for unexpected weight change.   Gastrointestinal: Negative for abdominal pain and vomiting.         Result Review :       Tissue Pathology Exam (02/08/2021 12:12)   COLONOSCOPY (02/08/2021 12:06)   UPPER GI ENDOSCOPY (02/08/2021 12:06)   Office Visit with Kilo Macedo MD (01/26/2021)       Vital Signs:   /70   Pulse (!) 49   Temp 97.7 °F (36.5 °C)   Ht 167.6 cm (65.98\")   Wt 89.8 kg (198 lb)   SpO2 100%   BMI 31.97 kg/m²     Body mass index is 31.97 kg/m².     Physical Exam  Vitals signs reviewed.   Constitutional:       General: She is not in acute distress.     Appearance: She is " well-developed.   HENT:      Head: Normocephalic and atraumatic.   Pulmonary:      Effort: Pulmonary effort is normal. No respiratory distress.   Skin:     General: Skin is dry.      Coloration: Skin is not pale.   Neurological:      Mental Status: She is alert and oriented to person, place, and time.   Psychiatric:         Thought Content: Thought content normal.           Assessment and Plan    Diagnoses and all orders for this visit:    1. Gastroesophageal reflux disease with esophagitis without hemorrhage (Primary)    2. Diverticulosis    3. History of colon polyps    4. Nausea    Other orders  -     famotidine (PEPCID) 40 MG tablet; Take 1 tablet by mouth Daily.  Dispense: 30 tablet; Refill: 5         Discussion  EGD and colonoscopy findings reviewed at today's office visit.  We will plan on colonoscopy for surveillance at 5-year interval due to her history of polyps.  Reviewed findings of esophagitis.  As Prilosec caused side effects, will begin trial of H2RA with treatment with Pepcid on a daily basis.    Reviewed recommendation for increasing fiber and starting fiber supplement secondary to diverticulosis to prevent future episodes of diverticulitis as well as to help with her irregular bowel habits.    If symptoms do not improve over the next 4 weeks with the addition of daily acid reducer and fiber, she was instructed to contact the office and we will schedule a gastric emptying study to evaluate for gastroparesis.          Follow Up   No follow-ups on file.    Patient Instructions   For GERD, follow antireflux precautions.  Recommend avoiding eating within 3 to 4 hours of bedtime.  Avoid foods that can trigger symptoms which may include citrus fruits, spicy foods, tomatoes and red sauces, chocolate, coffee/tea, caffeinated or carbonated beverages, alcohol.    For diverticulosis, follow a high-fiber diet.  Consider starting a daily fiber supplement, such as Metamucil or Citrucel, available  over-the-counter.    For surveillance of colon polyps, colonoscopy recommended at 5-year interval, due February 2026.    For GERD, follow antireflux precautions.  Recommend avoiding eating within 3 to 4 hours of bedtime.  Avoid foods that can trigger symptoms which may include citrus fruits, spicy foods, tomatoes and red sauces, chocolate, coffee/tea, caffeinated or carbonated beverages, alcohol.    If symptoms do not improve over the next 4 weeks, please contact the office and we will schedule gastric emptying study as discussed for further evaluation of nausea.

## 2021-02-25 ENCOUNTER — OFFICE VISIT (OUTPATIENT)
Dept: GASTROENTEROLOGY | Facility: CLINIC | Age: 56
End: 2021-02-25

## 2021-02-25 VITALS
HEIGHT: 66 IN | DIASTOLIC BLOOD PRESSURE: 70 MMHG | HEART RATE: 49 BPM | WEIGHT: 198 LBS | BODY MASS INDEX: 31.82 KG/M2 | SYSTOLIC BLOOD PRESSURE: 120 MMHG | OXYGEN SATURATION: 100 % | TEMPERATURE: 97.7 F

## 2021-02-25 DIAGNOSIS — Z86.010 HISTORY OF COLON POLYPS: ICD-10-CM

## 2021-02-25 DIAGNOSIS — R11.0 NAUSEA: ICD-10-CM

## 2021-02-25 DIAGNOSIS — K57.90 DIVERTICULOSIS: ICD-10-CM

## 2021-02-25 DIAGNOSIS — K21.00 GASTROESOPHAGEAL REFLUX DISEASE WITH ESOPHAGITIS WITHOUT HEMORRHAGE: Primary | ICD-10-CM

## 2021-02-25 PROCEDURE — 99214 OFFICE O/P EST MOD 30 MIN: CPT | Performed by: NURSE PRACTITIONER

## 2021-02-25 RX ORDER — FAMOTIDINE 40 MG/1
40 TABLET, FILM COATED ORAL DAILY
Qty: 30 TABLET | Refills: 5 | Status: SHIPPED | OUTPATIENT
Start: 2021-02-25 | End: 2021-08-17

## 2021-03-08 ENCOUNTER — OFFICE VISIT (OUTPATIENT)
Dept: INTERNAL MEDICINE | Facility: CLINIC | Age: 56
End: 2021-03-08

## 2021-03-08 VITALS
BODY MASS INDEX: 31.98 KG/M2 | TEMPERATURE: 98.4 F | SYSTOLIC BLOOD PRESSURE: 122 MMHG | DIASTOLIC BLOOD PRESSURE: 80 MMHG | OXYGEN SATURATION: 97 % | HEART RATE: 68 BPM | WEIGHT: 199 LBS | HEIGHT: 66 IN

## 2021-03-08 DIAGNOSIS — R51.9 NONINTRACTABLE HEADACHE, UNSPECIFIED CHRONICITY PATTERN, UNSPECIFIED HEADACHE TYPE: Primary | ICD-10-CM

## 2021-03-08 DIAGNOSIS — K21.9 GASTROESOPHAGEAL REFLUX DISEASE, UNSPECIFIED WHETHER ESOPHAGITIS PRESENT: ICD-10-CM

## 2021-03-08 PROCEDURE — 99213 OFFICE O/P EST LOW 20 MIN: CPT | Performed by: INTERNAL MEDICINE

## 2021-03-08 NOTE — PROGRESS NOTES
"Chief Complaint   Patient presents with   • Hypertension       Subjective   Imani Mazariegos is a 55 y.o. female.     History of Present Illness     She was recently evaluated in ER for headache.  Found to have elevated blood pressure. She believes the headache was related to taking prilosec which she had recently started for GERD.  She is now taking famotidine, not having headaches.      The following portions of the patient's history were reviewed and updated as appropriate: allergies, current medications, past family history, past medical history, past social history, past surgical history and problem list.    Review of Systems   Constitutional: Negative for appetite change.   HENT: Negative for nosebleeds.    Eyes: Negative for blurred vision and double vision.   Respiratory: Negative for cough and shortness of breath.    Cardiovascular: Negative for chest pain, palpitations and leg swelling.           Current Outpatient Medications:   •  estradiol (ESTRACE) 0.5 MG tablet, TAKE 1 TABLET BY MOUTH DAILY, Disp: 90 tablet, Rfl: 0  •  famotidine (PEPCID) 40 MG tablet, Take 1 tablet by mouth Daily., Disp: 30 tablet, Rfl: 5  •  ondansetron ODT (Zofran ODT) 4 MG disintegrating tablet, Place 1 tablet on the tongue Every 8 (Eight) Hours As Needed for Nausea or Vomiting., Disp: 20 tablet, Rfl: 0  •  Psyllium (METAMUCIL PO), Take  by mouth Daily., Disp: , Rfl:   •  VITAMIN D PO, , Disp: , Rfl:         Objective     /80   Pulse 68   Temp 98.4 °F (36.9 °C)   Ht 167.6 cm (65.98\")   Wt 90.3 kg (199 lb)   SpO2 97%   BMI 32.14 kg/m²     Physical Exam  Vitals and nursing note reviewed.   Constitutional:       General: She is not in acute distress.     Appearance: She is well-developed.   Neck:      Vascular: Normal carotid pulses. No carotid bruit.   Cardiovascular:      Rate and Rhythm: Regular rhythm.      Pulses:           Carotid pulses are 2+ on the right side and 2+ on the left side.     Heart sounds: S1 normal and " S2 normal. No murmur. No friction rub. No gallop.    Pulmonary:      Effort: Pulmonary effort is normal.      Breath sounds: Normal breath sounds. No wheezing, rhonchi or rales.   Skin:     General: Skin is warm and dry.   Neurological:      Mental Status: She is alert and oriented to person, place, and time.           Assessment/Plan   Diagnoses and all orders for this visit:    1. Nonintractable headache, unspecified chronicity pattern, unspecified headache type (Primary)    2. Gastroesophageal reflux disease, unspecified whether esophagitis present      Headache may have been related to prilosec.  Systolic BP borderline elevated at 122 today. She will continue famotidine for GERD. Return for CPE, fasting lab in 6 months 30 min.

## 2021-03-24 ENCOUNTER — BULK ORDERING (OUTPATIENT)
Dept: CASE MANAGEMENT | Facility: OTHER | Age: 56
End: 2021-03-24

## 2021-03-24 DIAGNOSIS — Z23 IMMUNIZATION DUE: ICD-10-CM

## 2021-03-30 RX ORDER — ESTRADIOL 0.5 MG/1
0.5 TABLET ORAL DAILY
Qty: 90 TABLET | Refills: 1 | Status: SHIPPED | OUTPATIENT
Start: 2021-03-30 | End: 2022-10-05 | Stop reason: SDUPTHER

## 2021-06-02 NOTE — PROGRESS NOTES
"Chief Complaint   Patient presents with   • Abdominal Pain         History of Present Illness  Patient is a 56-year-old female who presents today for follow-up.  She has a history of GERD, esophagitis, colon polyps, diverticulitis.    Patient presents today with concerns about recurrent diverticulitis.  She reports last week she noted a change in her bowel habits.  She became somewhat constipated and her stool was small in caliber.  Yesterday she developed severe left lower quadrant abdominal pain and had a fever of 101.  She reports this feels similar to her prior episodes of diverticulitis however the pain is much worse than it has been in the past.  This is her third episode of diverticulitis.    She denies any blood or mucus in the stools and denies any nausea or vomiting.    Review of Systems   Constitutional: Positive for fever.   Gastrointestinal: Positive for abdominal pain. Negative for blood in stool.        Result Review :       Tissue Pathology Exam (02/08/2021 12:12)   COLONOSCOPY (02/08/2021 12:06)   UPPER GI ENDOSCOPY (02/08/2021 12:06)   Office Visit with Mandie Carlton APRN (02/25/2021)   CT Abdomen Pelvis Stone Protocol (02/08/2020 13:27)      Vital Signs:   /74   Pulse 55   Temp 97.5 °F (36.4 °C) (Temporal)   Resp 16   Ht 167.6 cm (66\")   Wt 89.9 kg (198 lb 1.6 oz)   SpO2 99%   BMI 31.97 kg/m²     Body mass index is 31.97 kg/m².     Physical Exam  Vitals reviewed.   Constitutional:       General: She is not in acute distress.     Appearance: She is well-developed.   HENT:      Head: Normocephalic and atraumatic.   Pulmonary:      Effort: Pulmonary effort is normal. No respiratory distress.   Abdominal:      General: Abdomen is flat. Bowel sounds are normal.      Palpations: Abdomen is soft.      Tenderness: There is abdominal tenderness in the left lower quadrant. There is guarding and rebound.   Skin:     General: Skin is dry.      Coloration: Skin is not pale.   Neurological:     "  Mental Status: She is alert and oriented to person, place, and time.   Psychiatric:         Thought Content: Thought content normal.           Assessment and Plan    Diagnoses and all orders for this visit:    1. Left lower quadrant abdominal pain (Primary)  -     Cancel: CT Abdomen Pelvis With Contrast; Future  -     CT Abdomen Pelvis With Contrast; Future    2. Fever, unspecified fever cause  -     Cancel: CT Abdomen Pelvis With Contrast; Future  -     CT Abdomen Pelvis With Contrast; Future    3. Diverticulitis  -     Cancel: CT Abdomen Pelvis With Contrast; Future  -     CT Abdomen Pelvis With Contrast; Future    Other orders  -     amoxicillin-clavulanate (Augmentin) 875-125 MG per tablet; Take 1 tablet by mouth 2 (Two) Times a Day for 10 days.  Dispense: 20 tablet; Refill: 0         Discussion  Patient presents today with symptoms consistent with recurrent episode of diverticulitis.  Abdominal exam with severe tenderness to left lower quadrant.  We will initiate antibiotic therapy with Augmentin and schedule CT scan to rule out any evidence of complications from the diverticulitis.  Dependent upon clinical course, if patient continues to experience recurrent diverticulitis we may need to consider referral to surgeon to discuss possible resection.          Follow Up   Return if symptoms worsen or fail to improve.    Patient Instructions   Schedule CT scan for further evaluation.    For diverticulitis, start Augmentin as prescribed.  Prescription sent to pharmacy.

## 2021-06-03 ENCOUNTER — OFFICE VISIT (OUTPATIENT)
Dept: GASTROENTEROLOGY | Facility: CLINIC | Age: 56
End: 2021-06-03

## 2021-06-03 ENCOUNTER — HOSPITAL ENCOUNTER (OUTPATIENT)
Dept: CT IMAGING | Facility: HOSPITAL | Age: 56
Discharge: HOME | End: 2021-06-03
Admitting: NURSE PRACTITIONER

## 2021-06-03 VITALS
WEIGHT: 198.1 LBS | SYSTOLIC BLOOD PRESSURE: 118 MMHG | RESPIRATION RATE: 16 BRPM | HEART RATE: 55 BPM | HEIGHT: 66 IN | TEMPERATURE: 97.5 F | OXYGEN SATURATION: 99 % | BODY MASS INDEX: 31.84 KG/M2 | DIASTOLIC BLOOD PRESSURE: 74 MMHG

## 2021-06-03 DIAGNOSIS — R10.32 LEFT LOWER QUADRANT ABDOMINAL PAIN: ICD-10-CM

## 2021-06-03 DIAGNOSIS — R10.32 LEFT LOWER QUADRANT ABDOMINAL PAIN: Primary | ICD-10-CM

## 2021-06-03 DIAGNOSIS — K57.92 DIVERTICULITIS: ICD-10-CM

## 2021-06-03 DIAGNOSIS — R50.9 FEVER, UNSPECIFIED FEVER CAUSE: ICD-10-CM

## 2021-06-03 PROCEDURE — 25010000002 IOPAMIDOL 61 % SOLUTION: Performed by: NURSE PRACTITIONER

## 2021-06-03 PROCEDURE — 0 DIATRIZOATE MEGLUMINE & SODIUM PER 1 ML: Performed by: NURSE PRACTITIONER

## 2021-06-03 PROCEDURE — 99214 OFFICE O/P EST MOD 30 MIN: CPT | Performed by: NURSE PRACTITIONER

## 2021-06-03 PROCEDURE — 74177 CT ABD & PELVIS W/CONTRAST: CPT

## 2021-06-03 RX ORDER — AMOXICILLIN AND CLAVULANATE POTASSIUM 875; 125 MG/1; MG/1
1 TABLET, FILM COATED ORAL 2 TIMES DAILY
Qty: 20 TABLET | Refills: 0 | Status: SHIPPED | OUTPATIENT
Start: 2021-06-03 | End: 2021-06-13

## 2021-06-03 RX ADMIN — DIATRIZOATE MEGLUMINE AND DIATRIZOATE SODIUM 30 ML: 660; 100 LIQUID ORAL; RECTAL at 08:40

## 2021-06-03 RX ADMIN — IOPAMIDOL 85 ML: 612 INJECTION, SOLUTION INTRAVENOUS at 09:46

## 2021-06-03 NOTE — PATIENT INSTRUCTIONS
Schedule CT scan for further evaluation.    For diverticulitis, start Augmentin as prescribed.  Prescription sent to pharmacy.

## 2021-08-17 RX ORDER — FAMOTIDINE 40 MG/1
40 TABLET, FILM COATED ORAL DAILY
Qty: 30 TABLET | Refills: 5 | Status: SHIPPED | OUTPATIENT
Start: 2021-08-17 | End: 2022-01-20

## 2021-08-30 ENCOUNTER — E-VISIT (OUTPATIENT)
Dept: FAMILY MEDICINE CLINIC | Facility: TELEHEALTH | Age: 56
End: 2021-08-30

## 2021-08-30 DIAGNOSIS — H66.002 ACUTE SUPPURATIVE OTITIS MEDIA OF LEFT EAR WITHOUT SPONTANEOUS RUPTURE OF TYMPANIC MEMBRANE, RECURRENCE NOT SPECIFIED: Primary | ICD-10-CM

## 2021-08-30 PROCEDURE — 99422 OL DIG E/M SVC 11-20 MIN: CPT | Performed by: NURSE PRACTITIONER

## 2021-08-31 ENCOUNTER — PATIENT MESSAGE (OUTPATIENT)
Dept: FAMILY MEDICINE CLINIC | Facility: TELEHEALTH | Age: 56
End: 2021-08-31

## 2021-08-31 RX ORDER — CEFDINIR 300 MG/1
300 CAPSULE ORAL 2 TIMES DAILY
Qty: 20 CAPSULE | Refills: 0 | Status: SHIPPED | OUTPATIENT
Start: 2021-08-31 | End: 2021-09-10

## 2021-08-31 RX ORDER — TAMSULOSIN HYDROCHLORIDE 0.4 MG/1
1 CAPSULE ORAL DAILY
COMMUNITY
Start: 2021-07-06

## 2021-08-31 RX ORDER — FLUTICASONE PROPIONATE 50 MCG
2 SPRAY, SUSPENSION (ML) NASAL DAILY
Qty: 18.2 ML | Refills: 0 | Status: SHIPPED | OUTPATIENT
Start: 2021-08-31 | End: 2021-10-13

## 2021-08-31 NOTE — PATIENT INSTRUCTIONS
Otitis Media, Adult    Otitis media occurs when there is inflammation and fluid in the middle ear space with signs and symptoms of an acute infection. The middle ear is a part of the ear that contains bones for hearing as well as air that helps send sounds to the brain. When infected fluid builds up in this space, it causes pressure and results in symptoms of acute otitis media. The eustachian tube connects the middle ear to the back of the nose (nasopharynx) and normally allows air into the middle ear space. If the eustachian tube becomes blocked, fluid can build up and become infected.  What are the causes?  This condition is caused by a blockage in the eustachian tube. This can be caused by an object like mucus, or by swelling (edema) of the tube. Problems that can cause a blockage include:  · A cold or other upper respiratory infection.  · Allergies.  · An irritant, such as tobacco smoke.  · Enlarged adenoids. The adenoids are areas of soft tissue located high in the back of the throat, behind the nose and the roof of the mouth. They are part of the body's defense system (immune system).  · A mass in the nasopharynx.  · Damage to the ear caused by pressure changes (barotrauma).  What are the signs or symptoms?  Symptoms of this condition include:  · Ear pain.  · Fever.  · Decreased hearing.  · Tiredness (lethargy).  · Fluid leaking from the ear, if the eardrum is ruptured or has burst.  · Ringing in the ear.  How is this diagnosed?    This condition is diagnosed with a physical exam. During the exam, your health care provider will use an instrument called an otoscope to look in your ear and check for redness, swelling, and fluid. He or she will also ask about your symptoms.  Your health care provider may also order tests, such as:  · A pneumatic otoscopy. This is a test to check the movement of the eardrum. It is done by squeezing a small amount of air into the ear.  · A tympanogram is a test that shows how well  the eardrum moves in response to air pressure in the ear canal. It provides a graph for your health care provider to review.  How is this treated?  This condition can go away on its own within 3-5 days. But if the condition is caused by a bacterial infection and does not go away on its own, or if it keeps coming back, your health care provider may:  · Prescribe antibiotic medicine to treat the infection.  · Prescribe or recommend medicines to control pain.  Follow these instructions at home:  · Take over-the-counter and prescription medicines only as told by your health care provider.  · If you were prescribed an antibiotic medicine, take it as told by your health care provider. Do not stop taking the antibiotic even if you start to feel better.  · Keep all follow-up visits as told by your health care provider. This is important.  Contact a health care provider if:  · You have bleeding from your nose.  · There is a lump on your neck.  · You are not feeling better in 5 days.  · You feel worse instead of better.  Get help right away if:  · You have severe pain that is not controlled with medicine.  · You have swelling, redness, or pain around your ear.  · You have stiffness in your neck.  · A part of your face is not moving (paralyzed).  · The bone behind your ear (mastoid) is tender when you touch it.  · You develop a severe headache.  Summary  · Otitis media is redness, soreness, and swelling of the middle ear, usually resulting in pain.  · This condition can go away on its own within 3-5 days.  · If the problem does not go away in 3-5 days, your health care provider may prescribe or recommend medicines to treat the infection or your symptoms.  · If you were prescribed an antibiotic medicine, take it as told by your health care provider.  · Follow all instructions you were given by your health care provider.  This information is not intended to replace advice given to you by your health care provider. Make sure you  discuss any questions you have with your health care provider.  Document Revised: 11/19/2020 Document Reviewed: 11/19/2020  Elsevier Patient Education © 2021 [employer]

## 2021-08-31 NOTE — PROGRESS NOTES
Imani Mazariegos    1965  [id number]    I have reviewed the e-Visit questionnaire and patient's answers, my assessment and plan are as follows:      HPI  Imani Mazariegos is a 56 y.o. with a 1-4 day history of left ear pain, facial pain and pressure, nasal congestion.  Her left ear pain is radiating into her left jaw.  She has had similar symptoms in the past which resolved with antibiotic treatment.    Review of Systems - Negative except symptoms listed in HPI      Diagnoses and all orders for this visit:    1. Acute suppurative otitis media of left ear without spontaneous rupture of tympanic membrane, recurrence not specified (Primary)  -     cefdinir (OMNICEF) 300 MG capsule; Take 1 capsule by mouth 2 (Two) Times a Day for 10 days.  Dispense: 20 capsule; Refill: 0  -     fluticasone (FLONASE) 50 MCG/ACT nasal spray; 2 sprays into the nostril(s) as directed by provider Daily.  Dispense: 18.2 mL; Refill: 0    --Cefdinir is an antibiotic for bacterial infections, take as prescribed  --Flonase is a steroid nasal spray to decrease inflammation in nasal passages, sinuses and eustachian tubes of the ears, use as prescribed  --increase fluid intake; ibuprofen for ear pain  --if no improvement in 5-7 days, will need follow-up for further evaluation    Any medications prescribed have been sent electronically to   [id number] - [place], [place] - [address] [address] [address] [address] [address] [address] - [phone]  - [phone]   [address] [facility] [address]  [facility] [address]  Phone: [phone] Fax: [phone]      Time Documentation  Counseled patient  Counseling topics: diagnosis, treatment options and return instructions  Total encounter time: counseling time more than 50% of visit: 20 minutes        CASSIE James  08/31/21  09:51 EDT

## 2021-09-28 ENCOUNTER — TELEPHONE (OUTPATIENT)
Dept: GASTROENTEROLOGY | Facility: CLINIC | Age: 56
End: 2021-09-28

## 2021-09-28 ENCOUNTER — PATIENT MESSAGE (OUTPATIENT)
Dept: GASTROENTEROLOGY | Facility: CLINIC | Age: 56
End: 2021-09-28

## 2021-09-28 NOTE — TELEPHONE ENCOUNTER
FYI:  KANG PANTOJA on office VM stating that she sent you an email regarding a diverticulitis flare.  I attempted to call patient but no answer.

## 2021-09-29 RX ORDER — AMOXICILLIN AND CLAVULANATE POTASSIUM 875; 125 MG/1; MG/1
1 TABLET, FILM COATED ORAL 2 TIMES DAILY
Qty: 20 TABLET | Refills: 0 | Status: SHIPPED | OUTPATIENT
Start: 2021-09-29 | End: 2021-10-09

## 2021-10-05 NOTE — PROGRESS NOTES
"Chief Complaint   Patient presents with   • Diverticulitis         History of Present Illness  Patient is a 56-year-old female who presents today for follow-up.  She has a history of GERD, esophagitis, colon polyps, and diverticulitis.  She contacted the office recently with concerns about recurrent diverticulitis symptoms and was started on Augmentin.    Patient presents today with concerns about persistent diverticulitis.  She has had 6 episodes over the last 3 years and 3 episodes thus far this year.  She finished a course of Augmentin around 3 or 4 days ago but did not feel as though her symptoms fully resolved.  She reports she has had ongoing symptoms over the last 4 months and does not feel as though the episodes are clearing with antibiotic therapy.  She will see some improvement with antibiotics but then have recurrent symptoms.    She denies any fever.  She is generally having a daily bowel movement.  Denies any blood in the stool or mucus in the stool.  She continues to report left lower quadrant pain that radiates to her back and down her leg.  She has had some nausea associated with this but no vomiting.    She had previously been treated with metronidazole and Bactrim which she did not tolerate, prior treatment with ciprofloxacin caused a headache.    Review of Systems   Constitutional: Positive for fatigue. Negative for fever.        Result Review :       CT Abdomen Pelvis With Contrast (06/03/2021 09:57)   Office Visit with Mandie Carlton APRN (06/03/2021)   Tissue Pathology Exam (02/08/2021 12:12)   COLONOSCOPY (02/08/2021 12:06)   UPPER GI ENDOSCOPY (02/08/2021 12:06)   Office Visit with Mandie Carlton APRN (02/25/2021)       Vital Signs:   /72   Pulse 64   Temp 97.3 °F (36.3 °C) (Temporal)   Resp 16   Ht 167.6 cm (66\")   Wt 90.9 kg (200 lb 6.4 oz)   SpO2 98%   BMI 32.35 kg/m²     Body mass index is 32.35 kg/m².     Physical Exam  Vitals reviewed.   Constitutional:       General: " She is not in acute distress.     Appearance: She is well-developed.   HENT:      Head: Normocephalic and atraumatic.   Pulmonary:      Effort: Pulmonary effort is normal. No respiratory distress.   Abdominal:      General: Abdomen is flat. Bowel sounds are normal. There is no distension.      Palpations: Abdomen is soft.      Tenderness: There is abdominal tenderness in the left lower quadrant.   Skin:     General: Skin is dry.      Coloration: Skin is not pale.   Neurological:      Mental Status: She is alert and oriented to person, place, and time.   Psychiatric:         Thought Content: Thought content normal.           Assessment and Plan    Diagnoses and all orders for this visit:    1. Diverticulitis (Primary)  -     Ambulatory Referral to General Surgery    Other orders  -     metroNIDAZOLE (FLAGYL) 500 MG tablet; Take 1 tablet by mouth 3 (Three) Times a Day for 10 days. No alcohol while taking this medication  Dispense: 30 tablet; Refill: 0  -     levoFLOXacin (Levaquin) 750 MG tablet; Take 1 tablet by mouth Daily.  Dispense: 10 tablet; Refill: 0         Discussion  Patient presents today for follow-up of diverticulitis with persistent symptoms after antibiotic therapy.  She has had 3 episodes thus far this year and does not feel as though the symptoms fully resolved in between her last 2 episodes.  We will provide treatment with levofloxacin and metronidazole to attempt to resolve the symptoms however due to recurrent diverticulitis and failure to improve with antibiotic therapy, also recommended surgical consultation to discuss possible sigmoid resection.  We will arrange for consultation.  If patient experiences worsening pain or develops a fever she was instructed notify the office and at that point we will obtain updated CT scan.          Follow Up   Return if symptoms worsen or fail to improve.    Patient Instructions   For diverticulitis, complete course of levofloxacin and metronidazole as  prescribed.  Prescription sent to pharmacy.    For recurrent diverticulitis, we will refer for surgical consultation.    Call for any new or worsening symptoms or failure to improve.

## 2021-10-13 ENCOUNTER — OFFICE VISIT (OUTPATIENT)
Dept: GASTROENTEROLOGY | Facility: CLINIC | Age: 56
End: 2021-10-13

## 2021-10-13 VITALS
WEIGHT: 200.4 LBS | BODY MASS INDEX: 32.21 KG/M2 | OXYGEN SATURATION: 98 % | HEART RATE: 64 BPM | HEIGHT: 66 IN | DIASTOLIC BLOOD PRESSURE: 72 MMHG | RESPIRATION RATE: 16 BRPM | TEMPERATURE: 97.3 F | SYSTOLIC BLOOD PRESSURE: 118 MMHG

## 2021-10-13 DIAGNOSIS — K57.92 DIVERTICULITIS: Primary | ICD-10-CM

## 2021-10-13 PROBLEM — R56.9 SEIZURE (HCC): Status: ACTIVE | Noted: 2021-10-13

## 2021-10-13 PROCEDURE — 99214 OFFICE O/P EST MOD 30 MIN: CPT | Performed by: NURSE PRACTITIONER

## 2021-10-13 RX ORDER — CONJUGATED ESTROGENS 0.62 MG/G
0.5 CREAM VAGINAL
COMMUNITY
Start: 2021-08-25 | End: 2021-11-15 | Stop reason: ALTCHOICE

## 2021-10-13 RX ORDER — METRONIDAZOLE 500 MG/1
500 TABLET ORAL 3 TIMES DAILY
Qty: 30 TABLET | Refills: 0 | OUTPATIENT
Start: 2021-10-13 | End: 2021-10-18

## 2021-10-13 RX ORDER — LEVOFLOXACIN 750 MG/1
750 TABLET ORAL DAILY
Qty: 10 TABLET | Refills: 0 | OUTPATIENT
Start: 2021-10-13 | End: 2021-10-18

## 2021-10-13 NOTE — PATIENT INSTRUCTIONS
For diverticulitis, complete course of levofloxacin and metronidazole as prescribed.  Prescription sent to pharmacy.    For recurrent diverticulitis, we will refer for surgical consultation.    Call for any new or worsening symptoms or failure to improve.

## 2021-10-18 ENCOUNTER — HOSPITAL ENCOUNTER (EMERGENCY)
Facility: HOSPITAL | Age: 56
Discharge: HOME | End: 2021-10-18
Attending: EMERGENCY MEDICINE | Admitting: EMERGENCY MEDICINE

## 2021-10-18 ENCOUNTER — APPOINTMENT (OUTPATIENT)
Dept: CT IMAGING | Facility: HOSPITAL | Age: 56
End: 2021-10-18

## 2021-10-18 VITALS
BODY MASS INDEX: 32.14 KG/M2 | OXYGEN SATURATION: 96 % | HEART RATE: 56 BPM | RESPIRATION RATE: 15 BRPM | SYSTOLIC BLOOD PRESSURE: 92 MMHG | WEIGHT: 200 LBS | DIASTOLIC BLOOD PRESSURE: 53 MMHG | TEMPERATURE: 97.5 F | HEIGHT: 66 IN

## 2021-10-18 DIAGNOSIS — Z87.19 HISTORY OF DIVERTICULITIS: ICD-10-CM

## 2021-10-18 DIAGNOSIS — K29.00 ACUTE GASTRITIS WITHOUT HEMORRHAGE, UNSPECIFIED GASTRITIS TYPE: Primary | ICD-10-CM

## 2021-10-18 LAB
ALBUMIN SERPL-MCNC: 4.4 G/DL (ref 3.5–5.2)
ALBUMIN/GLOB SERPL: 2.2 G/DL
ALP SERPL-CCNC: 68 U/L (ref 39–117)
ALT SERPL W P-5'-P-CCNC: 45 U/L (ref 1–33)
ANION GAP SERPL CALCULATED.3IONS-SCNC: 10.8 MMOL/L (ref 5–15)
AST SERPL-CCNC: 43 U/L (ref 1–32)
BASOPHILS # BLD AUTO: 0.06 10*3/MM3 (ref 0–0.2)
BASOPHILS NFR BLD AUTO: 1.1 % (ref 0–1.5)
BILIRUB SERPL-MCNC: 0.4 MG/DL (ref 0–1.2)
BILIRUB UR QL STRIP: NEGATIVE
BUN SERPL-MCNC: 13 MG/DL (ref 6–20)
BUN/CREAT SERPL: 17.6 (ref 7–25)
CALCIUM SPEC-SCNC: 9 MG/DL (ref 8.6–10.5)
CHLORIDE SERPL-SCNC: 106 MMOL/L (ref 98–107)
CLARITY UR: CLEAR
CO2 SERPL-SCNC: 20.2 MMOL/L (ref 22–29)
COLOR UR: YELLOW
CREAT SERPL-MCNC: 0.74 MG/DL (ref 0.57–1)
DEPRECATED RDW RBC AUTO: 45.6 FL (ref 37–54)
EOSINOPHIL # BLD AUTO: 0.12 10*3/MM3 (ref 0–0.4)
EOSINOPHIL NFR BLD AUTO: 2.3 % (ref 0.3–6.2)
ERYTHROCYTE [DISTWIDTH] IN BLOOD BY AUTOMATED COUNT: 12.7 % (ref 12.3–15.4)
GFR SERPL CREATININE-BSD FRML MDRD: 81 ML/MIN/1.73
GLOBULIN UR ELPH-MCNC: 2 GM/DL
GLUCOSE SERPL-MCNC: 127 MG/DL (ref 65–99)
GLUCOSE UR STRIP-MCNC: NEGATIVE MG/DL
HCT VFR BLD AUTO: 46.3 % (ref 34–46.6)
HGB BLD-MCNC: 14.6 G/DL (ref 12–15.9)
HGB UR QL STRIP.AUTO: NEGATIVE
HOLD SPECIMEN: NORMAL
HOLD SPECIMEN: NORMAL
IMM GRANULOCYTES # BLD AUTO: 0 10*3/MM3 (ref 0–0.05)
IMM GRANULOCYTES NFR BLD AUTO: 0 % (ref 0–0.5)
KETONES UR QL STRIP: NEGATIVE
LEUKOCYTE ESTERASE UR QL STRIP.AUTO: NEGATIVE
LIPASE SERPL-CCNC: 22 U/L (ref 13–60)
LYMPHOCYTES # BLD AUTO: 1.18 10*3/MM3 (ref 0.7–3.1)
LYMPHOCYTES NFR BLD AUTO: 22.5 % (ref 19.6–45.3)
MAGNESIUM SERPL-MCNC: 2 MG/DL (ref 1.6–2.6)
MCH RBC QN AUTO: 30.4 PG (ref 26.6–33)
MCHC RBC AUTO-ENTMCNC: 31.5 G/DL (ref 31.5–35.7)
MCV RBC AUTO: 96.3 FL (ref 79–97)
MONOCYTES # BLD AUTO: 0.7 10*3/MM3 (ref 0.1–0.9)
MONOCYTES NFR BLD AUTO: 13.3 % (ref 5–12)
NEUTROPHILS NFR BLD AUTO: 3.19 10*3/MM3 (ref 1.7–7)
NEUTROPHILS NFR BLD AUTO: 60.8 % (ref 42.7–76)
NITRITE UR QL STRIP: NEGATIVE
NRBC BLD AUTO-RTO: 0 /100 WBC (ref 0–0.2)
PH UR STRIP.AUTO: 5.5 [PH] (ref 5–8)
PLATELET # BLD AUTO: 246 10*3/MM3 (ref 140–450)
PMV BLD AUTO: 10.4 FL (ref 6–12)
POTASSIUM SERPL-SCNC: 4.3 MMOL/L (ref 3.5–5.2)
PROT SERPL-MCNC: 6.4 G/DL (ref 6–8.5)
PROT UR QL STRIP: NEGATIVE
RBC # BLD AUTO: 4.81 10*6/MM3 (ref 3.77–5.28)
SODIUM SERPL-SCNC: 137 MMOL/L (ref 136–145)
SP GR UR STRIP: 1.01 (ref 1–1.03)
UROBILINOGEN UR QL STRIP: NORMAL
WBC # BLD AUTO: 5.25 10*3/MM3 (ref 3.4–10.8)
WHOLE BLOOD HOLD SPECIMEN: NORMAL
WHOLE BLOOD HOLD SPECIMEN: NORMAL

## 2021-10-18 PROCEDURE — 81003 URINALYSIS AUTO W/O SCOPE: CPT | Performed by: EMERGENCY MEDICINE

## 2021-10-18 PROCEDURE — 83735 ASSAY OF MAGNESIUM: CPT | Performed by: EMERGENCY MEDICINE

## 2021-10-18 PROCEDURE — 80053 COMPREHEN METABOLIC PANEL: CPT | Performed by: EMERGENCY MEDICINE

## 2021-10-18 PROCEDURE — 25010000002 IOPAMIDOL 61 % SOLUTION: Performed by: EMERGENCY MEDICINE

## 2021-10-18 PROCEDURE — 99283 EMERGENCY DEPT VISIT LOW MDM: CPT

## 2021-10-18 PROCEDURE — 74177 CT ABD & PELVIS W/CONTRAST: CPT

## 2021-10-18 PROCEDURE — 25010000002 ONDANSETRON PER 1 MG: Performed by: EMERGENCY MEDICINE

## 2021-10-18 PROCEDURE — 96375 TX/PRO/DX INJ NEW DRUG ADDON: CPT

## 2021-10-18 PROCEDURE — 25010000002 KETOROLAC TROMETHAMINE PER 15 MG: Performed by: EMERGENCY MEDICINE

## 2021-10-18 PROCEDURE — 96374 THER/PROPH/DIAG INJ IV PUSH: CPT

## 2021-10-18 PROCEDURE — 85025 COMPLETE CBC W/AUTO DIFF WBC: CPT | Performed by: EMERGENCY MEDICINE

## 2021-10-18 PROCEDURE — 83690 ASSAY OF LIPASE: CPT | Performed by: EMERGENCY MEDICINE

## 2021-10-18 RX ORDER — MOXIFLOXACIN HYDROCHLORIDE 400 MG/1
400 TABLET ORAL DAILY
Qty: 5 TABLET | Refills: 0 | Status: SHIPPED | OUTPATIENT
Start: 2021-10-18 | End: 2021-10-23

## 2021-10-18 RX ORDER — ONDANSETRON 4 MG/1
4 TABLET, ORALLY DISINTEGRATING ORAL EVERY 8 HOURS PRN
Qty: 12 TABLET | Refills: 0 | Status: SHIPPED | OUTPATIENT
Start: 2021-10-18 | End: 2021-10-21

## 2021-10-18 RX ORDER — KETOROLAC TROMETHAMINE 15 MG/ML
15 INJECTION, SOLUTION INTRAMUSCULAR; INTRAVENOUS ONCE
Status: COMPLETED | OUTPATIENT
Start: 2021-10-18 | End: 2021-10-18

## 2021-10-18 RX ORDER — SODIUM CHLORIDE 0.9 % (FLUSH) 0.9 %
10 SYRINGE (ML) INJECTION AS NEEDED
Status: DISCONTINUED | OUTPATIENT
Start: 2021-10-18 | End: 2021-10-18 | Stop reason: HOSPADM

## 2021-10-18 RX ORDER — ONDANSETRON 2 MG/ML
4 INJECTION INTRAMUSCULAR; INTRAVENOUS ONCE
Status: COMPLETED | OUTPATIENT
Start: 2021-10-18 | End: 2021-10-18

## 2021-10-18 RX ADMIN — SODIUM CHLORIDE, POTASSIUM CHLORIDE, SODIUM LACTATE AND CALCIUM CHLORIDE 1000 ML: 600; 310; 30; 20 INJECTION, SOLUTION INTRAVENOUS at 13:32

## 2021-10-18 RX ADMIN — ONDANSETRON 4 MG: 2 INJECTION INTRAMUSCULAR; INTRAVENOUS at 13:32

## 2021-10-18 RX ADMIN — IOPAMIDOL 85 ML: 612 INJECTION, SOLUTION INTRAVENOUS at 14:27

## 2021-10-18 RX ADMIN — KETOROLAC TROMETHAMINE 15 MG: 15 INJECTION, SOLUTION INTRAMUSCULAR; INTRAVENOUS at 15:29

## 2021-10-18 NOTE — ED TRIAGE NOTES
Pt to ER via PV. Pt states LLQ that started x3 months ago that has recently radiated to the right abdomen. Pt has been getting treated for diverticulitis and states it has been getting worse. Pt also c/o body aches and chills    Patient in mask. This RN in appropriate PPE throughout the patient's entire encounter.

## 2021-10-18 NOTE — ED PROVIDER NOTES
EMERGENCY DEPARTMENT ENCOUNTER    Room Number:  26/26  Date of encounter:  10/18/2021  PCP: Imani Dumont MD  Historian: Patient      HPI:  Chief Complaint: Abdominal pain  A complete HPI/ROS/PMH/PSH/SH/FH are unobtainable due to: None    Context: Imani Mazariegos is a 56 y.o. female who presents to the ED via private vehicle for evaluation for several months of ongoing intermittent episodes of abdominal pain and diverticulitis, most recently left abdominal pain that has been constant for last 3 to 4 weeks, has moved to the right side of her upper abdomen as of today.  Pain is described as a constant burning pain with some pressure.  Has had nausea without vomiting.  Has had diarrhea for the last 3 to 4 days.  No measured fevers, has had some chills.  No chest pain, shortness of breath, cough.  Denies any abnormal urinary urgency, frequency, or dysuria.  Patient recently on a course of Augmentin, no improvement for diverticulitis, was started on Levaquin and Flagyl on Wednesday of last week.  Reports no improvement on that.  States that she had Covid in September, had an episode of diverticulitis earlier this year noted on a colonoscopy.  Appetite intact may be slightly diminished.      MEDICAL RECORD REVIEW    Colonoscopy February 8, 2021    6 mm polyp in the ascending colon removed with cold snare.  7 mm polyp in the sigmoid colon removed with cold snare.  Severe diverticulosis in the sigmoid and descending colon.  Evidence of an impacted diverticulum at that time.  No evidence of diverticulitis.    Chart review in epic shows GI note from June 2021 with suspected diverticulitis and started on Augmentin at that point.    Office visit note from August 31 of 2021 with acute suppurative otitis media of the left ear, placed on cefdinir.    GI note reviewed from October 13, 2021, with initiation of Levaquin and Flagyl    CT abdomen and pelvis Cherry 3 of 2021    IMPRESSION:  CT findings of diverticulitis within the  proximal sigmoid  colon. No extraluminal air or fluid collection is seen.     Radiation dose reduction techniques were utilized, including automated  exposure control and exposure modulation based on body size.     This report was finalized on 6/3/2021 12:56 PM by Dr. Bing Sanchez M.D.    PAST MEDICAL HISTORY  Active Ambulatory Problems     Diagnosis Date Noted   • Symptomatic states associated with artificial menopause    • Back pain, lumbosacral    • Hyperlipidemia    • Flushing 2013   • Seizure (HCC) 10/13/2021   • Vaginal atrophy 2013     Resolved Ambulatory Problems     Diagnosis Date Noted   • Epilepsy without status epilepticus, not intractable (HCC)    • Abdominal pain, LLQ    • Abdominal pain, LUQ      Past Medical History:   Diagnosis Date   • Bradycardia    • Diverticulitis 2020   • Multiple falls    • Seizures (HCC)    • SOB (shortness of breath)          PAST SURGICAL HISTORY  Past Surgical History:   Procedure Laterality Date   • APPENDECTOMY     •  SECTION     • COLONOSCOPY      1 polyp, benign    • COLONOSCOPY N/A 2021    Procedure: COLONOSCOPY w/ polypectomy;  Surgeon: Kilo Macedo MD;  Location: [facility] [place] MAIN OR;  Service: Gastroenterology;  Laterality: N/A;   • ENDOSCOPY N/A 2021    Procedure: ESOPHAGOGASTRODUODENOSCOPY w/ biopsy;  Surgeon: Kilo Macedo MD;  Location: [facility] [place] MAIN OR;  Service: Gastroenterology;  Laterality: N/A;   • HYSTERECTOMY      SAMMI, BSO   • UPPER GASTROINTESTINAL ENDOSCOPY           FAMILY HISTORY  Family History   Problem Relation Age of Onset   • Sick sinus syndrome Mother    • Heart failure Mother    • Pernicious anemia Mother    • Hepatitis Mother         Hep C   • Other Mother         Peripheral neuropathy   • Colon polyps Mother    • Diverticulitis Mother    • Sick sinus syndrome Father         pacemaker   • Diabetes Maternal Grandmother    • Diabetes Paternal Grandmother    • No Known Problems Brother     • Stomach cancer Maternal Grandfather    • Colon cancer Neg Hx          SOCIAL HISTORY  Social History     Socioeconomic History   • Marital status:    • Number of children: 1   • Years of education: BSN   Tobacco Use   • Smoking status: Never Smoker   • Smokeless tobacco: Never Used   • Tobacco comment: caffeine use   Vaping Use   • Vaping Use: Never used   Substance and Sexual Activity   • Alcohol use: No   • Drug use: No   • Sexual activity: Defer         ALLERGIES  Ciprofloxacin hcl and Omeprazole        REVIEW OF SYSTEMS  Review of Systems     All systems reviewed and negative except for those discussed in HPI.       PHYSICAL EXAM    I have reviewed the triage vital signs and nursing notes.    ED Triage Vitals   Temp Heart Rate Resp BP SpO2   10/18/21 1041 10/18/21 1041 10/18/21 1041 10/18/21 1124 10/18/21 1041   97.5 °F (36.4 °C) 65 18 136/79 98 %      Temp src Heart Rate Source Patient Position BP Location FiO2 (%)   -- -- -- -- --              Physical Exam  General: Awake, alert, appears uncomfortable, nondiaphoretic  HEENT: Mucous membranes moist, atraumatic, EOMI  Neck: Full ROM  Pulm: Symmetric chest rise, nonlabored, lungs CTAB  Cardiovascular: Regular rate and rhythm, intact distal pulses  GI: Soft, right upper quadrant tenderness to palpation, no left-sided tenderness or lower abdominal tenderness to palpation, nondistended, no rebound, no guarding, bowel sounds present  MSK: Full ROM, no deformity  Skin: Warm, dry  Neuro: Alert and oriented x 3, GCS 15, moving all extremities, no focal deficits  Psych: Calm, cooperative      N95, protective eye goggles, and gloves used during this encounter. Patient in surgical mask.      LAB RESULTS  Recent Results (from the past 24 hour(s))   Green Top (Gel)    Collection Time: 10/18/21 11:27 AM   Result Value Ref Range    Extra Tube Hold for add-ons.    Lavender Top    Collection Time: 10/18/21 11:27 AM   Result Value Ref Range    Extra Tube hold for  add-on    Gold Top - SST    Collection Time: 10/18/21 11:27 AM   Result Value Ref Range    Extra Tube Hold for add-ons.    Light Blue Top    Collection Time: 10/18/21 11:27 AM   Result Value Ref Range    Extra Tube hold for add-on    Comprehensive Metabolic Panel    Collection Time: 10/18/21 11:27 AM    Specimen: Blood   Result Value Ref Range    Glucose 127 (H) 65 - 99 mg/dL    BUN 13 6 - 20 mg/dL    Creatinine 0.74 0.57 - 1.00 mg/dL    Sodium 137 136 - 145 mmol/L    Potassium 4.3 3.5 - 5.2 mmol/L    Chloride 106 98 - 107 mmol/L    CO2 20.2 (L) 22.0 - 29.0 mmol/L    Calcium 9.0 8.6 - 10.5 mg/dL    Total Protein 6.4 6.0 - 8.5 g/dL    Albumin 4.40 3.50 - 5.20 g/dL    ALT (SGPT) 45 (H) 1 - 33 U/L    AST (SGOT) 43 (H) 1 - 32 U/L    Alkaline Phosphatase 68 39 - 117 U/L    Total Bilirubin 0.4 0.0 - 1.2 mg/dL    eGFR Non African Amer 81 >60 mL/min/1.73    Globulin 2.0 gm/dL    A/G Ratio 2.2 g/dL    BUN/Creatinine Ratio 17.6 7.0 - 25.0    Anion Gap 10.8 5.0 - 15.0 mmol/L   Lipase    Collection Time: 10/18/21 11:27 AM    Specimen: Blood   Result Value Ref Range    Lipase 22 13 - 60 U/L   Magnesium    Collection Time: 10/18/21 11:27 AM    Specimen: Blood   Result Value Ref Range    Magnesium 2.0 1.6 - 2.6 mg/dL   CBC Auto Differential    Collection Time: 10/18/21 11:27 AM    Specimen: Blood   Result Value Ref Range    WBC 5.25 3.40 - 10.80 10*3/mm3    RBC 4.81 3.77 - 5.28 10*6/mm3    Hemoglobin 14.6 12.0 - 15.9 g/dL    Hematocrit 46.3 34.0 - 46.6 %    MCV 96.3 79.0 - 97.0 fL    MCH 30.4 26.6 - 33.0 pg    MCHC 31.5 31.5 - 35.7 g/dL    RDW 12.7 12.3 - 15.4 %    RDW-SD 45.6 37.0 - 54.0 fl    MPV 10.4 6.0 - 12.0 fL    Platelets 246 140 - 450 10*3/mm3    Neutrophil % 60.8 42.7 - 76.0 %    Lymphocyte % 22.5 19.6 - 45.3 %    Monocyte % 13.3 (H) 5.0 - 12.0 %    Eosinophil % 2.3 0.3 - 6.2 %    Basophil % 1.1 0.0 - 1.5 %    Immature Grans % 0.0 0.0 - 0.5 %    Neutrophils, Absolute 3.19 1.70 - 7.00 10*3/mm3    Lymphocytes, Absolute  1.18 0.70 - 3.10 10*3/mm3    Monocytes, Absolute 0.70 0.10 - 0.90 10*3/mm3    Eosinophils, Absolute 0.12 0.00 - 0.40 10*3/mm3    Basophils, Absolute 0.06 0.00 - 0.20 10*3/mm3    Immature Grans, Absolute 0.00 0.00 - 0.05 10*3/mm3    nRBC 0.0 0.0 - 0.2 /100 WBC   Urinalysis With Microscopic If Indicated (No Culture) - Urine, Clean Catch    Collection Time: 10/18/21 12:06 PM    Specimen: Urine, Clean Catch   Result Value Ref Range    Color, UA Yellow Yellow, Straw    Appearance, UA Clear Clear    pH, UA 5.5 5.0 - 8.0    Specific Gravity, UA 1.010 1.005 - 1.030    Glucose, UA Negative Negative    Ketones, UA Negative Negative    Bilirubin, UA Negative Negative    Blood, UA Negative Negative    Protein, UA Negative Negative    Leuk Esterase, UA Negative Negative    Nitrite, UA Negative Negative    Urobilinogen, UA 0.2 E.U./dL 0.2 - 1.0 E.U./dL       Ordered the above labs and independently reviewed the results.        RADIOLOGY  CT Abdomen Pelvis With Contrast    Result Date: 10/18/2021  CT ABDOMEN AND PELVIS WITH IV CONTRAST  HISTORY: 56-year-old female with lower abdominal pain. Hysterectomy and appendectomy in the past.  TECHNIQUE: Radiation dose reduction techniques were utilized, including automated exposure control and exposure modulation based on body size. 3 mm images were obtained through the abdomen and pelvis after the administration of IV contrast. Compared with previous CT 06/03/2021.  FINDINGS: There is a moderate degree of fatty infiltration of the liver. The gallbladder, pancreas, spleen, and adrenals appear unremarkable. There are single tiny nonobstructing stones within the kidneys. There are no ureteral stones or hydronephrosis bilaterally. There has been resolution of the previously seen diverticulitis at the proximal sigmoid colon and there is no evidence for new colonic thickening. There is no free fluid. Shotty retroperitoneal nodes are stable.      1. Resolved sigmoid diverticulitis and there is  no evidence for new colitis or diverticulitis. 2. Bilateral nephrolithiasis. There are no ureteral stones or hydronephrosis bilaterally. 3. Hepatic steatosis.  Discussed with Dr. Roldan.  This report was finalized on 10/18/2021 4:26 PM by Dr. Roopa Xiong M.D.        I ordered the above noted radiological studies. Reviewed by me.  See dictation for official radiology interpretation.      PROCEDURES    Procedures      MEDICATIONS GIVEN IN ER    Medications   ondansetron (ZOFRAN) injection 4 mg (4 mg Intravenous Given 10/18/21 1332)   lactated ringers bolus 1,000 mL (0 mL Intravenous Stopped 10/18/21 1441)   iopamidol (ISOVUE-300) 61 % injection 100 mL (85 mL Intravenous Given 10/18/21 1427)   ketorolac (TORADOL) injection 15 mg (15 mg Intravenous Given 10/18/21 1529)         PROGRESS, DATA ANALYSIS, CONSULTS, AND MEDICAL DECISION MAKING    All labs have been independently reviewed by me.  All radiology studies have been reviewed by me and discussed with radiologist dictating the report.   EKG's independently viewed and interpreted by me.  Discussion below represents my analysis of pertinent findings related to patient's condition, differential diagnosis, treatment plan and final disposition.    Initial concern for diverticulitis, colitis, UTI, kidney stone, bowel obstruction, gastritis, cholelithiasis, cholecystitis, pancreatitis, renal failure, electrolyte abnormalities, among others.    ED Course as of 10/18/21 1914   Mon Oct 18, 2021   1217 WBC: 5.25 [DC]   1217 Hemoglobin: 14.6 [DC]   1252 Nitrite, UA: Negative [DC]   1252 Leukocytes, UA: Negative [DC]   1518 Discussed the CT scan with Dr. Xiong, radiologist, patient with no evidence of any colitis or diverticulitis, no obstructive uropathy, no bowel obstruction, no evidence of any acute cholecystitis or cholelithiasis, no pancreatitis.  No acute emergent or urgent findings today. [DC]   1528 Patient updated on findings today, at this point I think probably her  pains are more of gastritis or side effect from Flagyl, and the patient is desperate to stop taking the Flagyl.  I worry that if we do not get appropriate anaerobic coverage that due to her recurrent episodes of diverticulitis that she that she may have a recurrence.  I have discussed it with the ED pharmacist, single coverage with moxifloxacin would be our only other option at this point as she seems to have potentially failed Augmentin during this course.  Patient is agreeable to this plan, would recommend close GI follow-up this week. [DC]      ED Course User Index  [DC] Patrick Roldan MD       AS OF 19:14 EDT VITALS:    BP - 92/53  HR - 56  TEMP - 97.5 °F (36.4 °C)  02 SATS - 96%        DIAGNOSIS  Final diagnoses:   Acute gastritis without hemorrhage, unspecified gastritis type   History of diverticulitis         DISPOSITION  DISCHARGE    Patient discharged in stable condition.    Reviewed implications of results, diagnosis, meds, responsibility to follow up, warning signs and symptoms of possible worsening, potential complications and reasons to return to ER.    Patient/Family voiced understanding of above instructions.    Discussed plan for discharge, as there is no emergent indication for admission. Patient referred to primary care provider for BP management due to today's BP. Pt/family is agreeable and understands need for follow up and repeat testing.  Pt is aware that discharge does not mean that nothing is wrong but it indicates no emergency is present that requires admission and they must continue care with follow-up as given below or physician of their choice.     FOLLOW-UP  [facility] Emergency Department  [address] [facility] [address] [phone]    As needed, If symptoms worsen    Imani Dumont MD  [address] [id number] [phone]    Schedule an appointment as soon as possible for a visit   As needed    [facility]  GASTROENTEROLOGY  [address] [address] [phone]  Schedule an appointment as soon as possible for a visit   for recheck         Medication List      New Prescriptions    moxifloxacin 400 MG tablet  Commonly known as: Avelox  Take 1 tablet by mouth Daily for 5 days.     ondansetron ODT 4 MG disintegrating tablet  Commonly known as: ZOFRAN-ODT  Place 1 tablet on the tongue Every 8 (Eight) Hours As Needed for Nausea or Vomiting for up to 3 days.        Stop    levoFLOXacin 750 MG tablet  Commonly known as: Levaquin     metroNIDAZOLE 500 MG tablet  Commonly known as: FLAGYL           Where to Get Your Medications      These medications were sent to [id number] - [facility] [address] [address] [address] [address] [address] [address] - [phone]  - [phone] [address] [address], [facility] [address]    Phone: [phone]   · moxifloxacin 400 MG tablet  · ondansetron ODT 4 MG disintegrating tablet                    Patrick Roldan MD  10/18/21 2322

## 2021-10-18 NOTE — DISCHARGE INSTRUCTIONS
Stop taking Levaquin and Flagyl and take entire course of prescribed moxifloxacin as discussed.  Increase your dose of Pepcid from 20 mg daily to 40 mg daily.  Avoid taking Tums or other antacids while taking this antibiotic.  Continue all other current medications.  PCP and GI follow-up as discussed.  ED return for worsening symptoms as needed.

## 2021-11-13 PROBLEM — K57.92 DIVERTICULITIS: Status: ACTIVE | Noted: 2021-11-13

## 2021-11-13 NOTE — PROGRESS NOTES
SURGERY  Imani Mazariegos   1965  11/15/21    Chief Complaint: Diverticulitis    HPI    Ms. Mazariegos is a very nice 56 y.o. female referred by stephanie Carlton with recurring diverticulitis.  She describes this as first occurring 3-4 years ago and has CT's at [facility] to show evaluation that long ago.  Her pain is in the left lower quadrant, dull, associated with sweats, fever, malaise.  Her pain definitely seems related to bowel function, not lifting, that would make one think of a hernia.  She feels like it doesn't really go away, having one episode with flagyl prescribed, which she didn't tolerate and then she had an immediate repeat episode.  She has a mother, grandmother and brother all with recurring diverticulitis.    She had a CT scan Cherry 3, 2021 showing diverticulitis within the proximal sigmoid with no extraluminal air or fluid collection There was thickening of the proximal sigmoid colonic wall with a thickened diverticulum in the posterior aspect with adjacent stranding and a small amount of fluid.  Her colon does not seem very redundant at the sigmoid, with some mild redundancy in the pelvis at the superior rectum, abuts the spleen somewhat posteriorly with the middle colic coming off pretty far to the right, and a lot of redundancy in the right sided transverse but straight shot over to the spleen.  The degree of inflammation looks moderate to me.    CT in October 18, 2021 shows resolved sigmoid diverticulitis with no evidence of new colitis.  Hepatic steatosis is noted on both films.  I agree with the reading that there does not appear to be any remaining diverticulitis.  She does have bilateral nephrolithiasis with no ureteral stones.    Unfortunately, she also has incontinence, fecal, and had urinary frequency that prompted flomax.  She responds yes to spinal injury, but it doesn't appear that she has a defined neurological explanation.      She has gained weight with COVID.  She states she  would be committed to losing weight if given a program.    Past Medical History:   Diagnosis Date   • Abdominal pain, LLQ    • Abdominal pain, LUQ    • Back pain, lumbosacral    • Bradycardia    • Colon polyp    • Diverticulitis 2020   • Diverticulitis of colon Sept 13   • Diverticulosis    • Epilepsy without status epilepticus, not intractable (HCC)    • GERD (gastroesophageal reflux disease)    • Hyperlipidemia    • Kidney stones    • Multiple falls    • Seizures (HCC)    • SOB (shortness of breath)    • Stroke (HCC)     unsure if Tia   • Symptomatic states associated with artificial menopause      Past Surgical History:   Procedure Laterality Date   • APPENDECTOMY     •  SECTION     • COLONOSCOPY      1 polyp, benign    • COLONOSCOPY N/A 2021    Procedure: COLONOSCOPY w/ polypectomy;  Surgeon: Kilo Macedo MD;  Location: [facility] [place] MAIN OR;  Service: Gastroenterology;  Laterality: N/A;   • COLONOSCOPY N/A 2015    Sigmoid diverticulosis, Repeat in 10 years - Dr. Erica Boyd   • ENDOSCOPY N/A 2021    Procedure: ESOPHAGOGASTRODUODENOSCOPY w/ biopsy;  Surgeon: Kilo Macedo MD;  Location: [facility] [place] MAIN OR;  Service: Gastroenterology;  Laterality: N/A;   • HYSTERECTOMY      SAMMI, BSO   • UPPER GASTROINTESTINAL ENDOSCOPY       Family History   Problem Relation Age of Onset   • Sick sinus syndrome Mother    • Heart failure Mother    • Pernicious anemia Mother    • Hepatitis Mother         Hep C   • Other Mother         Peripheral neuropathy   • Colon polyps Mother    • Diverticulitis Mother    • Early death Mother         natural causes   • Heart disease Mother         cad   • Hyperlipidemia Mother    • Hypertension Mother    • Sick sinus syndrome Father         pacemaker   • Arthritis Father    • Cancer Father         prostate   • Heart disease Father         ascvd   • Hyperlipidemia Father    • Hypertension Father    • Parkinsonism Father    • Diabetes  "Maternal Grandmother    • Diabetes Paternal Grandmother    • No Known Problems Brother    • Stomach cancer Maternal Grandfather    • Colon cancer Neg Hx      Social History     Socioeconomic History   • Marital status:    • Number of children: 1   • Years of education: BSN   Tobacco Use   • Smoking status: Never Smoker   • Smokeless tobacco: Never Used   • Tobacco comment: caffeine use   Vaping Use   • Vaping Use: Never used   Substance and Sexual Activity   • Alcohol use: No   • Drug use: No   • Sexual activity: Yes     Partners: Male     Birth control/protection: Post-menopausal         Current Outpatient Medications:   •  estradiol (ESTRACE) 0.5 MG tablet, Take 1 tablet by mouth Daily., Disp: 90 tablet, Rfl: 1  •  famotidine (PEPCID) 40 MG tablet, TAKE 1 TABLET BY MOUTH DAILY, Disp: 30 tablet, Rfl: 5  •  Psyllium (METAMUCIL PO), Take  by mouth Every Other Day., Disp: , Rfl:   •  tamsulosin (FLOMAX) 0.4 MG capsule 24 hr capsule, Take 1 capsule by mouth Daily., Disp: , Rfl:   •  VITAMIN D PO, Take 1 tablet by mouth Daily., Disp: , Rfl:     Allergies   Allergen Reactions   • Flagyl [Metronidazole] GI Intolerance     Review of Systems      Vitals:    11/15/21 1413   Weight: 91.6 kg (202 lb)   Height: 167.6 cm (66\")       PHYSICAL EXAM:    Ht 167.6 cm (66\")   Wt 91.6 kg (202 lb)   BMI 32.60 kg/m²   Body mass index is 32.6 kg/m².    Constitutional: well developed, well nourished, appears  healthy, stated age or younger in appearance  ENMT: Hearing intact, neck withut masses  CVS: RRR, no murmur,   Respiratory: CTA, normal respiratory effort   Gastrointestinal: abdomen soft, tender in the left lower quadrant, abdominal hernia not detected, anal tone poor on exam.  Genitourinary: inguinal hernia not detected  Musculoskeletal: gait normal, muscle mass normal  Neurological: awake and alert, seems to have reasonable capacity for understanding for medical decision making  Psychiatric: appears to have reasonable " judgement, pleasant    Radiographic/Lab Findings: reviewed with the patient in the cross sectional, sagittal and coronal views    Pamphlet reviewed: colon resection including the risk of leak, colostomy, ureteral injury, SBO    IMPRESSION:  · Recurring diverticulitis.  While she doesn't have CT evidence of continuing diverticulitis, i believe that she does have that.  I have cautioned her however, that i can't be sure, and it might just be IBS.  The localized nature argues against that.  · Body mass index is 32.6 kg/m².  · Incontinence.  I find this bothersome  · Urinary frequency on flomax.    PLAN:  · Shape up for surgery.  This would reduce her risk for infection, etc as listed below, and she is interested.  · Discussed with patient increased perioperative risks associated with obesity including increased risks of DVT, infection, seromas, poor wound healing and hernias (with abdominal surgery).  · If she escalates prior to her 3 month in training, she will call.  · Discussed the risks and benefits of surgery.  · Follow up after the first of the year for scheduling.    Erica Boyd MD  .    In order to provide a more personal and interactive patient experience as well as improve efficiency, this note was started prior to the office visit.

## 2021-11-15 ENCOUNTER — OFFICE VISIT (OUTPATIENT)
Dept: SURGERY | Facility: CLINIC | Age: 56
End: 2021-11-15

## 2021-11-15 VITALS — BODY MASS INDEX: 32.47 KG/M2 | HEIGHT: 66 IN | WEIGHT: 202 LBS

## 2021-11-15 DIAGNOSIS — K57.92 DIVERTICULITIS: Primary | ICD-10-CM

## 2021-11-15 PROCEDURE — 99204 OFFICE O/P NEW MOD 45 MIN: CPT | Performed by: SURGERY

## 2021-12-21 ENCOUNTER — APPOINTMENT (OUTPATIENT)
Dept: WOMENS IMAGING | Facility: HOSPITAL | Age: 56
End: 2021-12-21

## 2022-01-20 ENCOUNTER — APPOINTMENT (OUTPATIENT)
Dept: WOMENS IMAGING | Facility: HOSPITAL | Age: 57
End: 2022-01-20

## 2022-01-20 PROCEDURE — 77063 BREAST TOMOSYNTHESIS BI: CPT | Performed by: RADIOLOGY

## 2022-01-20 PROCEDURE — 77067 SCR MAMMO BI INCL CAD: CPT | Performed by: RADIOLOGY

## 2022-01-20 RX ORDER — FAMOTIDINE 40 MG/1
40 TABLET, FILM COATED ORAL DAILY
Qty: 30 TABLET | Refills: 5 | Status: SHIPPED | OUTPATIENT
Start: 2022-01-20 | End: 2022-07-05

## 2022-01-20 NOTE — PROGRESS NOTES
SURGERY  Imani Mazariegos   1965 01/24/22    Chief Complaint: Diverticulitis    HPI    Ms. Mazariegos is a very nice 56 y.o. female here for discussion of resection for recurring diverticulitis.  She was seen late last year and referred to [facility] to lose weight for such.  We did talk about the fact that her CTs no longer show convincing evidence of diverticulitis, and some of her symptoms could be from IBS.  Since her last visit she did start the program and has lost 13.2 pounds in 7 weeks.  She is ecstatic with that, and states that it is really changed her life.  She has stopped having cravings and really is eating much more healthy without preservatives and is very excited about her weight loss and wants to continue.    Unfortunately, her symptoms have continued for diverticulitis, being left lower quadrant pain, that radiates towards the midline.  It is particularly bad today and she feels like she might have a low-grade fever.  Augmentin has been the best treatment for her in the past and she cannot tolerate Flagyl.    Her symptoms first occured 3-4 years ago with CT's at [facility] to show evaluation that long ago.  Her pain is in the left lower quadrant, dull, associated with sweats, fever, malaise.  Her pain definitely seems related to bowel function, not lifting, that would make one think of a hernia.  She feels like it doesn't really go away, having one episode with flagyl prescribed, which she didn't tolerate and then she had an immediate repeat episode.  She has a mother, grandmother and brother all with recurring diverticulitis.    She had a CT scan Cherry 3, 2021 showing diverticulitis within the proximal sigmoid with no extraluminal air or fluid collection There was thickening of the proximal sigmoid colonic wall with a thickened diverticulum in the posterior aspect with adjacent stranding and a small amount of fluid.  Her colon does not seem very redundant at the sigmoid, with some mild  redundancy in the pelvis at the superior rectum, abuts the spleen somewhat posteriorly with the middle colic coming off pretty far to the right, and a lot of redundancy in the right sided transverse but straight shot over to the spleen.  The degree of inflammation looks moderate to me.    CT in 2021 shows resolved sigmoid diverticulitis with no evidence of new colitis.  Hepatic steatosis is noted on both films.  I agree with the reading that there does not appear to be any remaining diverticulitis.  She does have bilateral nephrolithiasis with no ureteral stones.    Unfortunately, she also has incontinence, fecal, and had urinary frequency that prompted flomax.  She responds yes to spinal injury, but it doesn't appear that she has a defined neurological explanation.      Past Medical History:   Diagnosis Date   • Abdominal pain, LLQ    • Abdominal pain, LUQ    • Back pain, lumbosacral    • Bradycardia    • Colon polyp    • Colon polyps 2021    Ascending colon: tubular adenoma, sigmoid colon: tubular adenoma   • Diverticulitis 2020   • Diverticulitis of colon Sept 13   • Diverticulosis    • Epilepsy without status epilepticus, not intractable (HCC)    • GERD (gastroesophageal reflux disease)    • Hyperlipidemia    • Kidney stones    • Multiple falls    • Seizures (HCC)    • SOB (shortness of breath)    • Stroke (HCC)     unsure if Tia   • Symptomatic states associated with artificial menopause      Past Surgical History:   Procedure Laterality Date   • APPENDECTOMY N/A    •  SECTION N/A    • COLONOSCOPY N/A 2021    Procedure: COLONOSCOPY w/ polypectomy;  Surgeon: Kilo Macedo MD;  Location: [facility] OR;  Service: Gastroenterology;  Laterality: N/A;   • COLONOSCOPY N/A 2015    Sigmoid diverticulosis, Repeat in 10 years-Dr. Erica Boyd, [facility]   • COLONOSCOPY W/ POLYPECTOMY N/A     1 polyp, benign    • ENDOSCOPY N/A 2021    Procedure:  ESOPHAGOGASTRODUODENOSCOPY w/ biopsy;  Surgeon: Kilo Macedo MD;  Location: [facility] OR;  Service: Gastroenterology;  Laterality: N/A;   • TOTAL LAPAROSCOPIC HYSTERECTOMY SALPINGO OOPHORECTOMY Bilateral 2003     Family History   Problem Relation Age of Onset   • Sick sinus syndrome Mother    • Heart failure Mother    • Pernicious anemia Mother    • Hepatitis Mother         Hep C   • Other Mother         Peripheral neuropathy   • Colon polyps Mother    • Diverticulitis Mother    • Early death Mother         natural causes   • Heart disease Mother         cad   • Hyperlipidemia Mother    • Hypertension Mother    • Sick sinus syndrome Father         pacemaker   • Arthritis Father    • Cancer Father         prostate   • Heart disease Father         ascvd   • Hyperlipidemia Father    • Hypertension Father    • Parkinsonism Father    • Diabetes Maternal Grandmother    • Diabetes Paternal Grandmother    • No Known Problems Brother    • Stomach cancer Maternal Grandfather    • Colon cancer Neg Hx      Social History     Socioeconomic History   • Marital status:    • Number of children: 1   • Years of education: BSN   Tobacco Use   • Smoking status: Never Smoker   • Smokeless tobacco: Never Used   • Tobacco comment: caffeine use   Vaping Use   • Vaping Use: Never used   Substance and Sexual Activity   • Alcohol use: No   • Drug use: No   • Sexual activity: Yes     Partners: Male     Birth control/protection: Post-menopausal         Current Outpatient Medications:   •  estradiol (ESTRACE) 0.5 MG tablet, Take 1 tablet by mouth Daily., Disp: 90 tablet, Rfl: 1  •  famotidine (PEPCID) 40 MG tablet, TAKE 1 TABLET BY MOUTH DAILY, Disp: 30 tablet, Rfl: 5  •  Psyllium (METAMUCIL PO), Take  by mouth Every Other Day., Disp: , Rfl:   •  tamsulosin (FLOMAX) 0.4 MG capsule 24 hr capsule, Take 1 capsule by mouth Daily., Disp: , Rfl:   •  VITAMIN D PO, Take 1 tablet by mouth Daily., Disp: , Rfl:   •  amoxicillin-clavulanate  "(Augmentin) 875-125 MG per tablet, Take 1 tablet by mouth 2 (Two) Times a Day., Disp: 28 tablet, Rfl: 0    Allergies   Allergen Reactions   • Flagyl [Metronidazole] GI Intolerance     Review of Systems  Fever and abdominal pain    Vitals:    01/24/22 1407   Weight: 86.4 kg (190 lb 6.4 oz)   Height: 167.6 cm (66\")       PHYSICAL EXAM:    Ht 167.6 cm (66\")   Wt 86.4 kg (190 lb 6.4 oz)   BMI 30.73 kg/m²   Body mass index is 30.73 kg/m².    Constitutional: well developed, well nourished, appears  healthy, stated age or younger in appearance  ENMT: Hearing intact, neck withut masses  CVS: RRR, no murmur,   Respiratory: CTA, normal respiratory effort   Gastrointestinal: abdomen soft, tender in the left lower quadrant, abdominal hernia not detected, anal tone poor on exam previously.  Genitourinary: inguinal hernia not detected  Musculoskeletal: gait normal, muscle mass normal  Neurological: awake and alert, seems to have reasonable capacity for understanding for medical decision making  Psychiatric: appears to have reasonable judgement, pleasant    Radiographic/Lab Findings: reviewed with the patient in the cross sectional, sagittal and coronal views previously    Pamphlet reviewed: colon resection including the risk of leak, colostomy, ureteral injury, SBO    IMPRESSION:  · Recurring diverticulitis.  While she doesn't have CT evidence of continuing diverticulitis, i believe that she does have that.  I have cautioned her however, that i can't be sure, and it might just be IBS.  The localized nature argues against that and the fevers.  Body mass index is 30.73 kg/m².   14 pound weight loss with [facility] and a desire to continue  · Incontinence.  I find this bothersome and not related  · Urinary frequency on flomax.  · Acute flare of diverticulitis    PLAN:  · Augmentin Rx.  I wrote for 2 weeks but told her to just take 7 days and if she is better stop, if not take 10 days, and if not then to call " me.  · Continue [facility].  This would reduce her risk for infection, etc as listed below, and she is interested.  · Discussed with patient increased perioperative risks associated with obesity including increased risks of DVT, infection, seromas, poor wound healing and hernias (with abdominal surgery).  · If she escalates prior to her 3 month in training, she will call.  · Discussed the risks and benefits of surgery.  · Follow-up in 3 months    Erica Boyd MD  .  Addendum  Another patient had a rapid in the office and I had recommended this and Ms. Mazariegos was so positive about her experience that I asked her to speak with my other patient and she has been recommending the same program for them..  She lives in [place] and does have to travel for that.    In order to provide a more personal and interactive patient experience as well as improve efficiency, this note was started prior to the office visit.

## 2022-01-24 ENCOUNTER — OFFICE VISIT (OUTPATIENT)
Dept: SURGERY | Facility: CLINIC | Age: 57
End: 2022-01-24

## 2022-01-24 VITALS — WEIGHT: 190.4 LBS | HEIGHT: 66 IN | BODY MASS INDEX: 30.6 KG/M2

## 2022-01-24 DIAGNOSIS — K57.92 DIVERTICULITIS: Primary | ICD-10-CM

## 2022-01-24 PROCEDURE — 99214 OFFICE O/P EST MOD 30 MIN: CPT | Performed by: SURGERY

## 2022-01-24 RX ORDER — AMOXICILLIN AND CLAVULANATE POTASSIUM 875; 125 MG/1; MG/1
1 TABLET, FILM COATED ORAL 2 TIMES DAILY
Qty: 28 TABLET | Refills: 0 | Status: SHIPPED | OUTPATIENT
Start: 2022-01-24 | End: 2022-02-02

## 2022-02-02 ENCOUNTER — OFFICE VISIT (OUTPATIENT)
Dept: INTERNAL MEDICINE | Facility: CLINIC | Age: 57
End: 2022-02-02

## 2022-02-02 VITALS
HEART RATE: 68 BPM | DIASTOLIC BLOOD PRESSURE: 40 MMHG | SYSTOLIC BLOOD PRESSURE: 100 MMHG | OXYGEN SATURATION: 98 % | WEIGHT: 189 LBS | HEIGHT: 66 IN | BODY MASS INDEX: 30.37 KG/M2 | TEMPERATURE: 98.4 F

## 2022-02-02 DIAGNOSIS — K21.9 GASTROESOPHAGEAL REFLUX DISEASE, UNSPECIFIED WHETHER ESOPHAGITIS PRESENT: ICD-10-CM

## 2022-02-02 DIAGNOSIS — Z13.220 ENCOUNTER FOR LIPID SCREENING FOR CARDIOVASCULAR DISEASE: ICD-10-CM

## 2022-02-02 DIAGNOSIS — Z13.6 ENCOUNTER FOR LIPID SCREENING FOR CARDIOVASCULAR DISEASE: ICD-10-CM

## 2022-02-02 DIAGNOSIS — Z00.00 ANNUAL PHYSICAL EXAM: Primary | ICD-10-CM

## 2022-02-02 DIAGNOSIS — Z79.890 HORMONE REPLACEMENT THERAPY (HRT): ICD-10-CM

## 2022-02-02 DIAGNOSIS — K57.92 DIVERTICULITIS: ICD-10-CM

## 2022-02-02 DIAGNOSIS — Z00.00 HEALTHCARE MAINTENANCE: ICD-10-CM

## 2022-02-02 PROCEDURE — 99396 PREV VISIT EST AGE 40-64: CPT | Performed by: NURSE PRACTITIONER

## 2022-02-02 NOTE — ASSESSMENT & PLAN NOTE
Routine GYN exams are advised.  Up-to-date on mammogram and Pap smear with her GYN provider.    Regular exercise, goal of 30 minutes/day 5 days/week and physical activity is recommended along with well-balanced diet.    Regular dental and vision exams are advised.

## 2022-02-02 NOTE — ASSESSMENT & PLAN NOTE
This has been a chronic issue, improved with Augmentin recently per the direction of Dr. Boyd.  She will see Dr. Boyd again in April 2022 for a follow-up.

## 2022-02-02 NOTE — PROGRESS NOTES
"Chief Complaint  Annual Exam    Subjective          Imani Mazariegos presents to [facility]  Patient presents for an annual physical.  This is a 56-year-old female patient of Dr. Dumont formerly.  This patient is new to me.  She would like to establish care with me as well today.    She has a history of recurrent diverticulitis.  She follows with Dr. Erica Boyd, general surgery.  She is currently on the HMR diet and has lost about 15 pounds.  She states that overall this has helped with her diverticulitis symptoms.  She has discussion of possible colon resection with Dr. Boyd in the future.  The plan currently per patient is for her to follow-up with general surgery in April 2022 for reevaluation.    She has GERD and sees GI regularly.  She takes Pepcid which controls the condition well along with avoidance of trigger foods.    She follows with a gynecologist regularly for mammograms and Pap smears.    She is a never smoker and does not drink alcohol regularly.    She had some mildly elevated liver enzymes in October 2021 and would like these rechecked today.    She is on hormone replacement therapy, estradiol 0.5 mg daily.    Overall reports that she is doing well and denies development of other new issues today.      Objective   Vital Signs:   /40   Pulse 68   Temp 98.4 °F (36.9 °C) (Temporal)   Ht 167.6 cm (65.98\")   Wt 85.7 kg (189 lb)   SpO2 98%   BMI 30.52 kg/m²     Physical Exam  Vitals and nursing note reviewed.   Constitutional:       General: She is not in acute distress.     Appearance: Normal appearance. She is well-developed. She is not ill-appearing, toxic-appearing or diaphoretic.   HENT:      Head: Normocephalic and atraumatic.      Right Ear: Tympanic membrane, ear canal and external ear normal.      Left Ear: Tympanic membrane, ear canal and external ear normal.   Eyes:      Pupils: Pupils are equal, round, and reactive to light.   Neck:      Vascular: " No carotid bruit.   Cardiovascular:      Rate and Rhythm: Normal rate and regular rhythm.      Pulses: Normal pulses.      Heart sounds: Normal heart sounds.      Comments: No peripheral edema  Pulmonary:      Effort: Pulmonary effort is normal. No respiratory distress.      Breath sounds: Normal breath sounds. No stridor. No wheezing, rhonchi or rales.   Chest:      Chest wall: No tenderness.   Abdominal:      General: Bowel sounds are normal. There is no distension.      Palpations: Abdomen is soft. There is no mass.      Tenderness: There is no abdominal tenderness. There is no right CVA tenderness, left CVA tenderness, guarding or rebound.      Hernia: No hernia is present.   Musculoskeletal:         General: Normal range of motion.      Cervical back: Normal range of motion and neck supple. No rigidity or tenderness.   Lymphadenopathy:      Cervical: No cervical adenopathy.   Skin:     General: Skin is warm and dry.      Capillary Refill: Capillary refill takes less than 2 seconds.   Neurological:      General: No focal deficit present.      Mental Status: She is alert and oriented to person, place, and time. Mental status is at baseline.   Psychiatric:         Mood and Affect: Mood normal.         Behavior: Behavior normal.         Thought Content: Thought content normal.         Judgment: Judgment normal.        Result Review :   The following data was reviewed by: CASSIE Stallworth on 02/02/2022:  Common labs    Common Labsle 2/22/21 2/22/21 10/18/21 10/18/21    1537 1537 1127 1127   Glucose  101 (A)  127 (A)   BUN  15  13   Creatinine  0.85  0.74   eGFR Non African Am  69  81   Sodium  140  137   Potassium  4.2  4.3   Chloride  107  106   Calcium  9.1  9.0   Albumin  4.30  4.40   Total Bilirubin  0.3  0.4   Alkaline Phosphatase  65  68   AST (SGOT)  29  43 (A)   ALT (SGPT)  27  45 (A)   WBC 5.92  5.25    Hemoglobin 15.2  14.6    Hematocrit 46.1  46.3    Platelets 240  246    (A) Abnormal value        Comments are available for some flowsheets but are not being displayed.           Current outpatient and discharge medications have been reconciled for the patient.  Reviewed by: CASSIE Stallworth           Assessment and Plan    Diagnoses and all orders for this visit:    1. Annual physical exam (Primary)    2. Diverticulitis  Assessment & Plan:  This has been a chronic issue, improved with Augmentin recently per the direction of Dr. Boyd.  She will see Dr. Boyd again in April 2022 for a follow-up.      3. Hormone replacement therapy (HRT)  Assessment & Plan:  Stable, for now continue estradiol 0.5 mg daily.      4. Gastroesophageal reflux disease, unspecified whether esophagitis present  Assessment & Plan:  Stable with famotidine and avoidance of trigger foods, continue current therapy.      5. Healthcare maintenance  Assessment & Plan:  Routine GYN exams are advised.  Up-to-date on mammogram and Pap smear with her GYN provider.    Regular exercise, goal of 30 minutes/day 5 days/week and physical activity is recommended along with well-balanced diet.    Regular dental and vision exams are advised.    Orders:  -     CBC No Differential  -     Comprehensive metabolic panel  -     TSH Rfx On Abnormal To Free T4    6. Encounter for lipid screening for cardiovascular disease  Comments:  Lipid panel today.  Orders:  -     Lipid panel    We will contact patient with lab results and any further recommendations.  Follow-up as needed and I will see her back in 1 year for a physical.    Follow Up   Return in about 1 year (around 2/2/2023) for Annual physical.  Patient was given instructions and counseling regarding her condition or for health maintenance advice. Please see specific information pulled into the AVS if appropriate.

## 2022-02-03 LAB
ALBUMIN SERPL-MCNC: 4.7 G/DL (ref 3.8–4.9)
ALBUMIN/GLOB SERPL: 2 {RATIO} (ref 1.2–2.2)
ALP SERPL-CCNC: 67 IU/L (ref 44–121)
ALT SERPL-CCNC: 29 IU/L (ref 0–32)
AST SERPL-CCNC: 28 IU/L (ref 0–40)
BILIRUB SERPL-MCNC: 0.4 MG/DL (ref 0–1.2)
BUN SERPL-MCNC: 23 MG/DL (ref 6–24)
BUN/CREAT SERPL: 23 (ref 9–23)
CALCIUM SERPL-MCNC: 9.6 MG/DL (ref 8.7–10.2)
CHLORIDE SERPL-SCNC: 103 MMOL/L (ref 96–106)
CHOLEST SERPL-MCNC: 195 MG/DL (ref 100–199)
CO2 SERPL-SCNC: 24 MMOL/L (ref 20–29)
CREAT SERPL-MCNC: 1 MG/DL (ref 0.57–1)
ERYTHROCYTE [DISTWIDTH] IN BLOOD BY AUTOMATED COUNT: 12 % (ref 11.7–15.4)
GLOBULIN SER CALC-MCNC: 2.4 G/DL (ref 1.5–4.5)
GLUCOSE SERPL-MCNC: 99 MG/DL (ref 65–99)
HCT VFR BLD AUTO: 45.6 % (ref 34–46.6)
HDLC SERPL-MCNC: 57 MG/DL
HGB BLD-MCNC: 15.3 G/DL (ref 11.1–15.9)
LDLC SERPL CALC-MCNC: 114 MG/DL (ref 0–99)
Lab: NORMAL
MCH RBC QN AUTO: 30.9 PG (ref 26.6–33)
MCHC RBC AUTO-ENTMCNC: 33.6 G/DL (ref 31.5–35.7)
MCV RBC AUTO: 92 FL (ref 79–97)
PLATELET # BLD AUTO: 223 X10E3/UL (ref 150–450)
POTASSIUM SERPL-SCNC: 4.7 MMOL/L (ref 3.5–5.2)
PROT SERPL-MCNC: 7.1 G/DL (ref 6–8.5)
RBC # BLD AUTO: 4.95 X10E6/UL (ref 3.77–5.28)
SODIUM SERPL-SCNC: 141 MMOL/L (ref 134–144)
TRIGL SERPL-MCNC: 138 MG/DL (ref 0–149)
TSH SERPL DL<=0.005 MIU/L-ACNC: 3 UIU/ML (ref 0.45–4.5)
VLDLC SERPL CALC-MCNC: 24 MG/DL (ref 5–40)
WBC # BLD AUTO: 6 X10E3/UL (ref 3.4–10.8)

## 2022-02-03 NOTE — PROGRESS NOTES
Dar Ms. Mazariegos, labs are back. Blood count looks good with no anemia, normal white count and platelets. Normal kidney function, electrolytes and liver enzymes. Cholesterol panel showing mildly elevated LDL cholesterol but normal total cholesterol and triglycerides- continue with well balanced diet low in fats and regular exercise and we will recheck routinely. Thyroid numbers are normal. Let me know if you have any questions and have a great day,   CASSIE Stallworth

## 2022-04-20 NOTE — PROGRESS NOTES
SURGERY  Imani Mazariegos   1965 04/25/22    Chief Complaint: Diverticulitis    HPI    Ms. Mazariegos is a very nice 56 y.o. female here for discussion of resection for recurring diverticulitis.  She was seen first in late 2021, referred to shape up for surgery, and then returned with a 14 pound weight loss in January.  She opted to continue with efforts to lose weight prior to surgery.  In total since starting that she has lost 21 pounds.  Everything is better including her lab work.  Today her biggest complaint is actually that of reflux, with intolerance to tomato products into anything spicy.  She has not had any recent left lower quadrant pain.    We talked about the fact that her first episode was 4 years ago, the most recent last summer, with nothing convincing since then and that I had already told her that I thought some of her symptoms might be IBS rather than an actual infection.  This comes up today because I am concerned that she really may not need surgery.  Her fecal incontinence happily is better, that being another reason that I did not want to proceed with surgery.    Her prior history is as listed below:    We did talk about the fact that her CTs no longer show convincing evidence of diverticulitis, and some of her symptoms could be from IBS.  Unfortunately, her symptoms have continued of left lower quadrant pain radiating towards the midline.  On her follow-up visit in January I had to give her Augmentin as I felt she was having an acute flare again.     Her symptoms first occured 3-4 years ago with CT's at [facility] to show evaluation that long ago.  Her pain is in the left lower quadrant, dull, associated with sweats, fever, malaise.  Her pain definitely seems related to bowel function, not lifting, that would make one think of a hernia.  She feels like it doesn't really go away, having one episode with flagyl prescribed, which she didn't tolerate and then she had an immediate repeat episode.   She has a mother, grandmother and brother all with recurring diverticulitis.    She had a CT scan Cherry 3, 2021 showing diverticulitis within the proximal sigmoid with no extraluminal air or fluid collection There was thickening of the proximal sigmoid colonic wall with a thickened diverticulum in the posterior aspect with adjacent stranding and a small amount of fluid.  Her colon does not seem very redundant at the sigmoid, with some mild redundancy in the pelvis at the superior rectum, abuts the spleen somewhat posteriorly with the middle colic coming off pretty far to the right, and a lot of redundancy in the right sided transverse but straight shot over to the spleen.  The degree of inflammation looks moderate to me.    CT in October 18, 2021 shows resolved sigmoid diverticulitis with no evidence of new colitis.  Hepatic steatosis is noted on both films.  I agree with the reading that there does not appear to be any remaining diverticulitis.  She does have bilateral nephrolithiasis with no ureteral stones.    Unfortunately, she also has incontinence, fecal, and had urinary frequency that prompted flomax.  She responds yes to spinal injury, but it doesn't appear that she has a defined neurological explanation.      Past Medical History:   Diagnosis Date   • Abdominal pain, LLQ    • Abdominal pain, LUQ    • Back pain, lumbosacral    • Bradycardia    • Colon polyp 2015   • Colon polyps 02/09/2021    Ascending colon: tubular adenoma, sigmoid colon: tubular adenoma   • Diverticulitis 12/17/2020   • Diverticulitis of colon Sept 13   • Diverticulosis    • Epilepsy without status epilepticus, not intractable (HCC)    • GERD (gastroesophageal reflux disease)    • Hyperlipidemia    • Kidney stones    • Multiple falls    • Seizures (HCC)    • SOB (shortness of breath)    • Stroke (HCC) 1999    unsure if Tia   • Symptomatic states associated with artificial menopause      Past Surgical History:   Procedure Laterality Date    • APPENDECTOMY N/A    •  SECTION N/A    • COLONOSCOPY N/A 2021    Procedure: COLONOSCOPY w/ polypectomy;  Surgeon: Kilo Macedo MD;  [place] [facility] [place];  Service: Gastroenterology;  Laterality: N/A;   • COLONOSCOPY N/A 2015    Sigmoid diverticulosis, Repeat in 10 years-Dr. Erica Boyd, [facility]   • COLONOSCOPY W/ POLYPECTOMY N/A     1 polyp, benign    • ENDOSCOPY N/A 2021    Procedure: ESOPHAGOGASTRODUODENOSCOPY w/ biopsy;  Surgeon: Kilo Macedo MD;  [place] [facility] [place];  Service: Gastroenterology;  Laterality: N/A;   • TOTAL LAPAROSCOPIC HYSTERECTOMY SALPINGO OOPHORECTOMY Bilateral      Family History   Problem Relation Age of Onset   • Sick sinus syndrome Mother    • Heart failure Mother    • Pernicious anemia Mother    • Hepatitis Mother         Hep C   • Other Mother         Peripheral neuropathy   • Colon polyps Mother    • Diverticulitis Mother    • Early death Mother         natural causes   • Heart disease Mother         cad   • Hyperlipidemia Mother    • Hypertension Mother    • Sick sinus syndrome Father         pacemaker   • Arthritis Father    • Cancer Father         prostate   • Heart disease Father         ascvd   • Hyperlipidemia Father    • Hypertension Father    • Parkinsonism Father    • Diabetes Maternal Grandmother    • Diabetes Paternal Grandmother    • No Known Problems Brother    • Stomach cancer Maternal Grandfather    • Colon cancer Neg Hx      Social History     Socioeconomic History   • Marital status:    • Number of children: 1   • Years of education: BSN   Tobacco Use   • Smoking status: Never Smoker   • Smokeless tobacco: Never Used   • Tobacco comment: caffeine use   Vaping Use   • Vaping Use: Never used   Substance and Sexual Activity   • Alcohol use: No   • Drug use: No   • Sexual activity: Yes     Partners: Male     Birth control/protection: Post-menopausal         Current Outpatient Medications:   •  estradiol  "(ESTRACE) 0.5 MG tablet, Take 1 tablet by mouth Daily., Disp: 90 tablet, Rfl: 1  •  famotidine (PEPCID) 40 MG tablet, TAKE 1 TABLET BY MOUTH DAILY, Disp: 30 tablet, Rfl: 5  •  Psyllium (METAMUCIL PO), Take  by mouth Every Other Day., Disp: , Rfl:   •  tamsulosin (FLOMAX) 0.4 MG capsule 24 hr capsule, Take 1 capsule by mouth Daily., Disp: , Rfl:   •  VITAMIN D PO, Take 1 tablet by mouth Daily., Disp: , Rfl:     Allergies   Allergen Reactions   • Flagyl [Metronidazole] GI Intolerance     Review of Systems  Reflux    Vitals:    04/25/22 1336   Weight: 83.7 kg (184 lb 9.6 oz)   Height: 167.6 cm (65.98\")       PHYSICAL EXAM:    Ht 167.6 cm (65.98\")   Wt 83.7 kg (184 lb 9.6 oz)   BMI 29.81 kg/m²   Body mass index is 29.81 kg/m².    Constitutional: well developed, well nourished, appears  healthy, stated age or younger in appearance  ENMT: Hearing intact, neck withut masses  CVS: RRR, no murmur,   Respiratory: CTA, normal respiratory effort   Gastrointestinal: abdomen soft, tender in the left lower quadrant, abdominal hernia not detected, anal tone poor on exam previously.  Genitourinary: inguinal hernia not detected  Musculoskeletal: gait normal, muscle mass normal  Neurological: awake and alert, seems to have reasonable capacity for understanding for medical decision making  Psychiatric: appears to have reasonable judgement, pleasant    Radiographic/Lab Findings: reviewed with the patient in the cross sectional, sagittal and coronal views previously and I read reviewed today to look at the area of the diverticulitis which was present but not overly dramatic    Pamphlet reviewed: colon resection including the risk of leak, colostomy, ureteral injury, SBO    IMPRESSION:  · Diverticulitis.  Uncertain now if her symptoms throughout this last year had been diverticulitis or IBS.  There is been nothing convincing recently.  I do believe she had diverticulitis initially, but nothing that I can consider a smoking gun since " last summer.    Body mass index is 29.81 kg/m².     21 pound weight loss with shape up for surgery and a desire to continue weight loss.  She is no longer in the program.  · Incontinence.  Improved with weight loss  · Urinary frequency on flomax.  · Reflux, controlled with Pepcid    PLAN:  · Hold on consideration of a colon resection.  She is not having active symptoms and I do not think the symptoms prior to that warrant surgery alone without continuation of these.  · Continue with weight loss.  This will hopefully help even further with her incontinence as well as definitely will help with her reflux  · Follow-up if left lower quadrant pain resumes.    Erica Boyd MD    In order to provide a more personal and interactive patient experience as well as improve efficiency, this note was started prior to the office visit.  Discussion of over 30 minutes to determine if in fact we should hold on surgery.

## 2022-04-25 ENCOUNTER — OFFICE VISIT (OUTPATIENT)
Dept: SURGERY | Facility: CLINIC | Age: 57
End: 2022-04-25

## 2022-04-25 VITALS — WEIGHT: 184.6 LBS | BODY MASS INDEX: 29.67 KG/M2 | HEIGHT: 66 IN

## 2022-04-25 DIAGNOSIS — K57.92 DIVERTICULITIS: Primary | ICD-10-CM

## 2022-04-25 PROCEDURE — 99214 OFFICE O/P EST MOD 30 MIN: CPT | Performed by: SURGERY

## 2022-07-05 RX ORDER — FAMOTIDINE 40 MG/1
40 TABLET, FILM COATED ORAL DAILY
Qty: 30 TABLET | Refills: 5 | Status: SHIPPED | OUTPATIENT
Start: 2022-07-05 | End: 2022-10-12

## 2022-09-11 ENCOUNTER — APPOINTMENT (OUTPATIENT)
Dept: CT IMAGING | Facility: HOSPITAL | Age: 57
End: 2022-09-11

## 2022-09-11 ENCOUNTER — HOSPITAL ENCOUNTER (EMERGENCY)
Facility: HOSPITAL | Age: 57
Discharge: HOME | End: 2022-09-11
Attending: EMERGENCY MEDICINE | Admitting: EMERGENCY MEDICINE

## 2022-09-11 ENCOUNTER — APPOINTMENT (OUTPATIENT)
Dept: GENERAL RADIOLOGY | Facility: HOSPITAL | Age: 57
End: 2022-09-11

## 2022-09-11 VITALS
SYSTOLIC BLOOD PRESSURE: 105 MMHG | DIASTOLIC BLOOD PRESSURE: 65 MMHG | HEART RATE: 61 BPM | TEMPERATURE: 97.6 F | OXYGEN SATURATION: 98 % | RESPIRATION RATE: 16 BRPM

## 2022-09-11 DIAGNOSIS — R19.7 INTERMITTENT DIARRHEA: ICD-10-CM

## 2022-09-11 DIAGNOSIS — R10.10 UPPER ABDOMINAL PAIN: ICD-10-CM

## 2022-09-11 DIAGNOSIS — R11.2 NAUSEA AND VOMITING, UNSPECIFIED VOMITING TYPE: Primary | ICD-10-CM

## 2022-09-11 DIAGNOSIS — R50.9 FEVER, UNSPECIFIED FEVER CAUSE: ICD-10-CM

## 2022-09-11 LAB
ALBUMIN SERPL-MCNC: 4.6 G/DL (ref 3.5–5.2)
ALBUMIN/GLOB SERPL: 1.9 G/DL
ALP SERPL-CCNC: 75 U/L (ref 39–117)
ALT SERPL W P-5'-P-CCNC: 33 U/L (ref 1–33)
ANION GAP SERPL CALCULATED.3IONS-SCNC: 5.3 MMOL/L (ref 5–15)
AST SERPL-CCNC: 32 U/L (ref 1–32)
BASOPHILS # BLD AUTO: 0.01 10*3/MM3 (ref 0–0.2)
BASOPHILS NFR BLD AUTO: 0.2 % (ref 0–1.5)
BILIRUB SERPL-MCNC: 0.6 MG/DL (ref 0–1.2)
BILIRUB UR QL STRIP: NEGATIVE
BUN SERPL-MCNC: 11 MG/DL (ref 6–20)
BUN/CREAT SERPL: 13.8 (ref 7–25)
CALCIUM SPEC-SCNC: 9 MG/DL (ref 8.6–10.5)
CHLORIDE SERPL-SCNC: 104 MMOL/L (ref 98–107)
CLARITY UR: ABNORMAL
CO2 SERPL-SCNC: 28.7 MMOL/L (ref 22–29)
COLOR UR: YELLOW
CREAT SERPL-MCNC: 0.8 MG/DL (ref 0.57–1)
D-LACTATE SERPL-SCNC: 1.1 MMOL/L (ref 0.5–2)
DEPRECATED RDW RBC AUTO: 44.4 FL (ref 37–54)
EGFRCR SERPLBLD CKD-EPI 2021: 85 ML/MIN/1.73
EOSINOPHIL # BLD AUTO: 0.01 10*3/MM3 (ref 0–0.4)
EOSINOPHIL NFR BLD AUTO: 0.2 % (ref 0.3–6.2)
ERYTHROCYTE [DISTWIDTH] IN BLOOD BY AUTOMATED COUNT: 12.4 % (ref 12.3–15.4)
GLOBULIN UR ELPH-MCNC: 2.4 GM/DL
GLUCOSE SERPL-MCNC: 124 MG/DL (ref 65–99)
GLUCOSE UR STRIP-MCNC: NEGATIVE MG/DL
HCT VFR BLD AUTO: 48.9 % (ref 34–46.6)
HGB BLD-MCNC: 15.8 G/DL (ref 12–15.9)
HGB UR QL STRIP.AUTO: NEGATIVE
IMM GRANULOCYTES # BLD AUTO: 0.02 10*3/MM3 (ref 0–0.05)
IMM GRANULOCYTES NFR BLD AUTO: 0.4 % (ref 0–0.5)
KETONES UR QL STRIP: ABNORMAL
LEUKOCYTE ESTERASE UR QL STRIP.AUTO: NEGATIVE
LIPASE SERPL-CCNC: 16 U/L (ref 13–60)
LYMPHOCYTES # BLD AUTO: 0.55 10*3/MM3 (ref 0.7–3.1)
LYMPHOCYTES NFR BLD AUTO: 10.4 % (ref 19.6–45.3)
MCH RBC QN AUTO: 31 PG (ref 26.6–33)
MCHC RBC AUTO-ENTMCNC: 32.3 G/DL (ref 31.5–35.7)
MCV RBC AUTO: 95.9 FL (ref 79–97)
MONOCYTES # BLD AUTO: 0.61 10*3/MM3 (ref 0.1–0.9)
MONOCYTES NFR BLD AUTO: 11.6 % (ref 5–12)
NEUTROPHILS NFR BLD AUTO: 4.08 10*3/MM3 (ref 1.7–7)
NEUTROPHILS NFR BLD AUTO: 77.2 % (ref 42.7–76)
NITRITE UR QL STRIP: NEGATIVE
NRBC BLD AUTO-RTO: 0 /100 WBC (ref 0–0.2)
PH UR STRIP.AUTO: 6 [PH] (ref 5–8)
PLATELET # BLD AUTO: 201 10*3/MM3 (ref 140–450)
PMV BLD AUTO: 9.7 FL (ref 6–12)
POTASSIUM SERPL-SCNC: 3.8 MMOL/L (ref 3.5–5.2)
PROCALCITONIN SERPL-MCNC: 0.08 NG/ML (ref 0–0.25)
PROT SERPL-MCNC: 7 G/DL (ref 6–8.5)
PROT UR QL STRIP: NEGATIVE
QT INTERVAL: 449 MS
RBC # BLD AUTO: 5.1 10*6/MM3 (ref 3.77–5.28)
SODIUM SERPL-SCNC: 138 MMOL/L (ref 136–145)
SP GR UR STRIP: 1.02 (ref 1–1.03)
TROPONIN T SERPL-MCNC: <0.01 NG/ML (ref 0–0.03)
UROBILINOGEN UR QL STRIP: ABNORMAL
WBC NRBC COR # BLD: 5.28 10*3/MM3 (ref 3.4–10.8)

## 2022-09-11 PROCEDURE — 96361 HYDRATE IV INFUSION ADD-ON: CPT

## 2022-09-11 PROCEDURE — 83605 ASSAY OF LACTIC ACID: CPT | Performed by: PHYSICIAN ASSISTANT

## 2022-09-11 PROCEDURE — 84484 ASSAY OF TROPONIN QUANT: CPT | Performed by: PHYSICIAN ASSISTANT

## 2022-09-11 PROCEDURE — 93010 ELECTROCARDIOGRAM REPORT: CPT | Performed by: INTERNAL MEDICINE

## 2022-09-11 PROCEDURE — 96374 THER/PROPH/DIAG INJ IV PUSH: CPT

## 2022-09-11 PROCEDURE — 84145 PROCALCITONIN (PCT): CPT | Performed by: PHYSICIAN ASSISTANT

## 2022-09-11 PROCEDURE — 80053 COMPREHEN METABOLIC PANEL: CPT | Performed by: NURSE PRACTITIONER

## 2022-09-11 PROCEDURE — 25010000002 IOPAMIDOL 61 % SOLUTION: Performed by: EMERGENCY MEDICINE

## 2022-09-11 PROCEDURE — 71046 X-RAY EXAM CHEST 2 VIEWS: CPT

## 2022-09-11 PROCEDURE — 83690 ASSAY OF LIPASE: CPT | Performed by: NURSE PRACTITIONER

## 2022-09-11 PROCEDURE — 74177 CT ABD & PELVIS W/CONTRAST: CPT

## 2022-09-11 PROCEDURE — 81003 URINALYSIS AUTO W/O SCOPE: CPT | Performed by: NURSE PRACTITIONER

## 2022-09-11 PROCEDURE — 25010000002 ONDANSETRON PER 1 MG: Performed by: EMERGENCY MEDICINE

## 2022-09-11 PROCEDURE — 99283 EMERGENCY DEPT VISIT LOW MDM: CPT

## 2022-09-11 PROCEDURE — 87040 BLOOD CULTURE FOR BACTERIA: CPT | Performed by: PHYSICIAN ASSISTANT

## 2022-09-11 PROCEDURE — 93005 ELECTROCARDIOGRAM TRACING: CPT | Performed by: PHYSICIAN ASSISTANT

## 2022-09-11 PROCEDURE — 85025 COMPLETE CBC W/AUTO DIFF WBC: CPT | Performed by: NURSE PRACTITIONER

## 2022-09-11 RX ORDER — ONDANSETRON 4 MG/1
4 TABLET, ORALLY DISINTEGRATING ORAL EVERY 6 HOURS PRN
Qty: 20 TABLET | Refills: 0 | Status: SHIPPED | OUTPATIENT
Start: 2022-09-11

## 2022-09-11 RX ORDER — ONDANSETRON 2 MG/ML
4 INJECTION INTRAMUSCULAR; INTRAVENOUS ONCE
Status: COMPLETED | OUTPATIENT
Start: 2022-09-11 | End: 2022-09-11

## 2022-09-11 RX ORDER — SODIUM CHLORIDE 0.9 % (FLUSH) 0.9 %
10 SYRINGE (ML) INJECTION AS NEEDED
Status: DISCONTINUED | OUTPATIENT
Start: 2022-09-11 | End: 2022-09-11 | Stop reason: HOSPADM

## 2022-09-11 RX ORDER — DICYCLOMINE HCL 20 MG
20 TABLET ORAL EVERY 6 HOURS PRN
Qty: 20 TABLET | Refills: 0 | Status: SHIPPED | OUTPATIENT
Start: 2022-09-11 | End: 2022-11-03

## 2022-09-11 RX ADMIN — ONDANSETRON 4 MG: 2 INJECTION INTRAMUSCULAR; INTRAVENOUS at 10:25

## 2022-09-11 RX ADMIN — SODIUM CHLORIDE 1000 ML: 9 INJECTION, SOLUTION INTRAVENOUS at 12:02

## 2022-09-11 RX ADMIN — IOPAMIDOL 85 ML: 612 INJECTION, SOLUTION INTRAVENOUS at 11:09

## 2022-09-11 NOTE — DISCHARGE INSTRUCTIONS
Drink plenty fluids.  Take medications as prescribed.  Return to the emergency department for worsening or persistent symptoms, chest pain, shortness of breath, or other concern.  Follow-up with your primary care provider in the next few days if symptoms or not improving.

## 2022-09-11 NOTE — ED PROVIDER NOTES
EMERGENCY DEPARTMENT ENCOUNTER    Room Number: 20/20  Date Seen: 9/11/2022  Time Seen: 10:31 EDT  PCP: Ira Mcadams APRN    Historian: Patient  Language Interpretor Used: No      HISTORY OF PRESENT ILLNESS    Chief Complaint: abdominal pain, nausea, vomiting    Context: Imani Mazariegos is a 57 y.o. female with PMHx of Diverticulitis who presents to the ED with c/o 1 week of nausea, vomiting, and intermittent fevers.  She additionally reports upper abdominal pain which she describes as burning that has woken her up at night.  She also states the pain radiates between her scapula at times.  She notes her last measured fever was this morning at 2 AM at 101 °F.  She reports taking Tylenol which has improved her fever.  She reports associated frequent diarrhea with occasional formed stool.  She denies any melena, or bright red blood.  She denies any hematemesis.  She denies any chest pain or shortness of breath.  She states she has not had any GI issues over the past year after losing weight but has had history of diverticulitis and possible irritable bowel in the past.  Patient does not have any Zofran at home as she recently ran out.    MEDICAL RECORD REVIEW    Reviewed in Epic       PAST MEDICAL HISTORY    Active Ambulatory Problems     Diagnosis Date Noted   • Symptomatic states associated with artificial menopause    • Back pain, lumbosacral    • Hyperlipidemia    • Flushing 07/02/2013   • Seizure (HCC) 10/13/2021   • Vaginal atrophy 07/02/2013   • Diverticulitis 11/13/2021   • Gastroesophageal reflux disease 02/02/2022   • Hormone replacement therapy (HRT) 02/02/2022   • Healthcare maintenance 02/02/2022     Resolved Ambulatory Problems     Diagnosis Date Noted   • Epilepsy without status epilepticus, not intractable (HCC)    • Abdominal pain, LLQ    • Abdominal pain, LUQ      Past Medical History:   Diagnosis Date   • Bradycardia    • Colon polyp 2015   • Colon polyps 02/09/2021   • Diverticulitis of colon Sept  13   • Diverticulosis    • GERD (gastroesophageal reflux disease)    • Kidney stones    • Multiple falls    • Seizures (HCC)    • SOB (shortness of breath)    • Stroke (HCC)          PAST SURGICAL HISTORY    Past Surgical History:   Procedure Laterality Date   • APPENDECTOMY N/A    •  SECTION N/A    • COLONOSCOPY N/A 2021    Procedure: COLONOSCOPY w/ polypectomy;  Surgeon: Kilo Macedo MD;  Location: [facility];  Service: Gastroenterology;  Laterality: N/A;   • COLONOSCOPY N/A 2015    Sigmoid diverticulosis, Repeat in 10 years-Dr. Erica Boyd, [facility]   • COLONOSCOPY W/ POLYPECTOMY N/A     1 polyp, benign    • ENDOSCOPY N/A 2021    Procedure: ESOPHAGOGASTRODUODENOSCOPY w/ biopsy;  Surgeon: Kilo Macedo MD;  [place] [facility];  Service: Gastroenterology;  Laterality: N/A;   • TOTAL LAPAROSCOPIC HYSTERECTOMY SALPINGO OOPHORECTOMY Bilateral          FAMILY HISTORY    Family History   Problem Relation Age of Onset   • Sick sinus syndrome Mother    • Heart failure Mother    • Pernicious anemia Mother    • Hepatitis Mother         Hep C   • Other Mother         Peripheral neuropathy   • Colon polyps Mother    • Diverticulitis Mother    • Early death Mother         natural causes   • Heart disease Mother         cad   • Hyperlipidemia Mother    • Hypertension Mother    • Sick sinus syndrome Father         pacemaker   • Arthritis Father    • Cancer Father         prostate   • Heart disease Father         ascvd   • Hyperlipidemia Father    • Hypertension Father    • Parkinsonism Father    • Diabetes Maternal Grandmother    • Diabetes Paternal Grandmother    • No Known Problems Brother    • Stomach cancer Maternal Grandfather    • Colon cancer Neg Hx          SOCIAL HISTORY    Social History     Socioeconomic History   • Marital status:    • Number of children: 1   • Years of education: BSN   Tobacco Use   • Smoking status: Never Smoker   • Smokeless  tobacco: Never Used   • Tobacco comment: caffeine use   Vaping Use   • Vaping Use: Never used   Substance and Sexual Activity   • Alcohol use: No   • Drug use: No   • Sexual activity: Yes     Partners: Male     Birth control/protection: Post-menopausal         ALLERGIES    Flagyl [metronidazole]      REVIEW OF SYSTEMS    Review of Systems   Constitutional: Positive for chills and fever.   HENT: Negative for congestion and sore throat.    Respiratory: Negative for cough and shortness of breath.    Cardiovascular: Negative for chest pain and leg swelling.   Gastrointestinal: Positive for abdominal pain, diarrhea, nausea and vomiting. Negative for blood in stool.   Genitourinary: Negative for dysuria, flank pain and frequency.   Musculoskeletal: Negative for back pain and neck pain.   Skin: Negative.        All systems reviewed and negative except those discussed in HPI.      PHYSICAL EXAM    ED Triage Vitals [09/11/22 0858]   Temp Heart Rate Resp BP SpO2   97.5 °F (36.4 °C) 74 16 103/75 99 %      Temp src Heart Rate Source Patient Position BP Location FiO2 (%)   Tympanic Monitor -- -- --       I have reviewed the triage vital signs and nursing notes.    Constitutional: Well appearing, not distressed, nontoxic  Head: Atraumatic, normocephalic  Eyes: No scleral icterus, no scleral injection  ENT: Nares patent  CV: Regular rate, regular rhythm, distal pulses symmetric  Respiratory: No distress, CTAB  GI: Abdomen soft, nontender, no guarding or rebound tenderness  Musculoskeletal: No deformity, soft compartments, no edema  Skin: Warm, dry, no rash  Neuro: A&Ox4, moves all extremities, follows commands, no focal deficits  Psych: Normal mood      DIFFERENTIAL DIAGNOSIS    My differential diagnosis for abdominal pain includes but is not limited to:  Gastritis, gastroenteritis, peptic ulcer disease, GERD, esophageal perforation, acute appendicitis, mesenteric adenitis, Meckel’s diverticulum, epiploic appendagitis,  diverticulitis, colon cancer, ulcerative colitis, Crohn’s disease, intussusception, small bowel obstruction, adhesions, ischemic bowel, perforated viscus, ileus, obstipation, biliary colic, cholecystitis, cholelithiasis, [place] Carmelo, hepatitis, pancreatitis, common bile duct obstruction, cholangitis, bile leak, splenic trauma, splenic rupture, splenic infarction, splenic abscess, abdominal abscess, ascites, spontaneous bacterial peritonitis, hernia, UTI, cystitis, ureterolithiasis, urinary obstruction, ovarian cyst, torsion, pregnancy, ectopic pregnancy, PID, pelvic abscess, mittelschmerz, endometriosis, AAA, myocardial infarction, pneumonia, cancer, porphyria, DKA, medications, sickle cell, viral syndrome, zoster      LAB RESULTS    Recent Results (from the past 24 hour(s))   Comprehensive Metabolic Panel    Collection Time: 09/11/22  9:57 AM    Specimen: Blood   Result Value Ref Range    Glucose 124 (H) 65 - 99 mg/dL    BUN 11 6 - 20 mg/dL    Creatinine 0.80 0.57 - 1.00 mg/dL    Sodium 138 136 - 145 mmol/L    Potassium 3.8 3.5 - 5.2 mmol/L    Chloride 104 98 - 107 mmol/L    CO2 28.7 22.0 - 29.0 mmol/L    Calcium 9.0 8.6 - 10.5 mg/dL    Total Protein 7.0 6.0 - 8.5 g/dL    Albumin 4.60 3.50 - 5.20 g/dL    ALT (SGPT) 33 1 - 33 U/L    AST (SGOT) 32 1 - 32 U/L    Alkaline Phosphatase 75 39 - 117 U/L    Total Bilirubin 0.6 0.0 - 1.2 mg/dL    Globulin 2.4 gm/dL    A/G Ratio 1.9 g/dL    BUN/Creatinine Ratio 13.8 7.0 - 25.0    Anion Gap 5.3 5.0 - 15.0 mmol/L    eGFR 85.0 >60.0 mL/min/1.73   Lipase    Collection Time: 09/11/22  9:57 AM    Specimen: Blood   Result Value Ref Range    Lipase 16 13 - 60 U/L   Urinalysis With Microscopic If Indicated (No Culture) - Urine, Clean Catch    Collection Time: 09/11/22  9:57 AM    Specimen: Urine, Clean Catch   Result Value Ref Range    Color, UA Yellow Yellow, Straw    Appearance, UA Cloudy (A) Clear    pH, UA 6.0 5.0 - 8.0    Specific Gravity, UA 1.019 1.005 - 1.030    Glucose,  UA Negative Negative    Ketones, UA Trace (A) Negative    Bilirubin, UA Negative Negative    Blood, UA Negative Negative    Protein, UA Negative Negative    Leuk Esterase, UA Negative Negative    Nitrite, UA Negative Negative    Urobilinogen, UA 1.0 E.U./dL 0.2 - 1.0 E.U./dL   CBC Auto Differential    Collection Time: 09/11/22  9:57 AM    Specimen: Blood   Result Value Ref Range    WBC 5.28 3.40 - 10.80 10*3/mm3    RBC 5.10 3.77 - 5.28 10*6/mm3    Hemoglobin 15.8 12.0 - 15.9 g/dL    Hematocrit 48.9 (H) 34.0 - 46.6 %    MCV 95.9 79.0 - 97.0 fL    MCH 31.0 26.6 - 33.0 pg    MCHC 32.3 31.5 - 35.7 g/dL    RDW 12.4 12.3 - 15.4 %    RDW-SD 44.4 37.0 - 54.0 fl    MPV 9.7 6.0 - 12.0 fL    Platelets 201 140 - 450 10*3/mm3    Neutrophil % 77.2 (H) 42.7 - 76.0 %    Lymphocyte % 10.4 (L) 19.6 - 45.3 %    Monocyte % 11.6 5.0 - 12.0 %    Eosinophil % 0.2 (L) 0.3 - 6.2 %    Basophil % 0.2 0.0 - 1.5 %    Immature Grans % 0.4 0.0 - 0.5 %    Neutrophils, Absolute 4.08 1.70 - 7.00 10*3/mm3    Lymphocytes, Absolute 0.55 (L) 0.70 - 3.10 10*3/mm3    Monocytes, Absolute 0.61 0.10 - 0.90 10*3/mm3    Eosinophils, Absolute 0.01 0.00 - 0.40 10*3/mm3    Basophils, Absolute 0.01 0.00 - 0.20 10*3/mm3    Immature Grans, Absolute 0.02 0.00 - 0.05 10*3/mm3    nRBC 0.0 0.0 - 0.2 /100 WBC   Procalcitonin    Collection Time: 09/11/22  9:57 AM    Specimen: Blood   Result Value Ref Range    Procalcitonin 0.08 0.00 - 0.25 ng/mL   Troponin    Collection Time: 09/11/22  9:57 AM    Specimen: Blood   Result Value Ref Range    Troponin T <0.010 0.000 - 0.030 ng/mL   ECG 12 Lead    Collection Time: 09/11/22 11:47 AM   Result Value Ref Range    QT Interval 449 ms   Lactic Acid, Plasma    Collection Time: 09/11/22 11:51 AM    Specimen: Arm, Left; Blood   Result Value Ref Range    Lactate 1.1 0.5 - 2.0 mmol/L       I ordered the above labs and independently reviewed the results.    RADIOLOGY RESULTS    XR Chest 2 View    Result Date: 9/11/2022  PA AND LATERAL  RADIOGRAPHIC VIEWS OF THE CHEST  CLINICAL HISTORY: Fever and vomiting.  FINDINGS:  PA and lateral radiographic views of the chest demonstrate clear lungs. The cardiomediastinal silhouette is within normal limits. The osseous structures are unremarkable. The visualized upper abdomen is unremarkable.       No active disease in the chest.  This report was finalized on 9/11/2022 11:32 AM by Dr. Wagner Alanis M.D.      CT Abdomen Pelvis With Contrast    Result Date: 9/11/2022  CT ABDOMEN PELVIS W CONTRAST-  INDICATIONS: Abdominal pain and fevers  TECHNIQUE: Radiation dose reduction techniques were utilized, including automated exposure control and exposure modulation based on body size. Enhanced ABDOMEN AND PELVIS CT  COMPARISON: 10/18/2021  FINDINGS:  Relative low-density of the liver suggests steatosis.  Punctate nonobstructive renal calcifications are seen, one in each kidney.  Otherwise unremarkable appearance of the liver, gallbladder, spleen, adrenal glands, pancreas, kidneys, bladder.  No bowel obstruction or abnormal bowel thickening is identified. No appendix is noted, compatible with stated history of prior appendectomy. Colonic diverticula are seen that do not appear inflamed.  No free intraperitoneal gas or free fluid.  Scattered small mesenteric and para-aortic lymph nodes are seen that are not significant by size criteria.  Abdominal aorta is not aneurysmal. Aortic and other arterial calcifications are present.  The lung bases are clear.  Degenerative changes are seen in the spine. No acute fracture is identified.          1. Colonic diverticulosis. No acute inflammatory process of bowel is identified. Follow up as indications persist. 2. No obstructive uropathy. Bilateral nonobstructive nephrolithiasis.  This report was finalized on 9/11/2022 11:39 AM by Dr. Jurgen Rogers M.D.        I ordered the above noted radiological studies and reviewed the images on the PACS system.      PROCEDURES    None    EKG    Interpreted by ED Physician. Please see their note for documentation.    MEDICATIONS GIVEN IN ER    Medications   sodium chloride 0.9 % flush 10 mL (has no administration in time range)   ondansetron (ZOFRAN) injection 4 mg (4 mg Intravenous Given 9/11/22 1025)   sodium chloride 0.9 % bolus 1,000 mL (0 mL Intravenous Stopped 9/11/22 1328)   iopamidol (ISOVUE-300) 61 % injection 100 mL (85 mL Intravenous Given 9/11/22 1109)         PROGRESS, CONSULTS, and MEDICAL DECISION MAKING    ED Course as of 09/11/22 1408   Sun Sep 11, 2022   1116 Care signed out to Dr. Hernandez [DC]   1153 CT Abdomen Pelvis With Contrast []   1342 Test results discussed with the patient.  She is resting comfortably.  Work-up is unremarkable.  She will be discharged with prescriptions for Zofran and Bentyl.  Return precautions were discussed. [WH]      ED Course User Index  [DC] Laurie Mancini PA  [] Alfred Hernandez MD           DIAGNOSIS  Final diagnoses:   Nausea and vomiting, unspecified vomiting type   Upper abdominal pain   Intermittent diarrhea   Fever, unspecified fever cause       DISPOSITION  ED Disposition     ED Disposition   Discharge    Condition   Stable    Comment   --             FOLLOW UP  Ira Mcadams, APRN  [address] [address] [phone]    Call in 2 days  If symptoms persist      DISCHARGE RX     Medication List      New Prescriptions    dicyclomine 20 MG tablet  Commonly known as: BENTYL  Take 1 tablet by mouth Every 6 (Six) Hours As Needed (abdominal cramps).     ondansetron ODT 4 MG disintegrating tablet  Commonly known as: ZOFRAN-ODT  Place 1 tablet on the tongue Every 6 (Six) Hours As Needed for Nausea or Vomiting.           Where to Get Your Medications      These medications were sent to [id number] - [facility] [address] [facility] [address] [place] [address] [address] [place] [address] - [phone] [facility] [phone] [address]  [address], [facility] [address]    Phone: [phone]   · dicyclomine 20 MG tablet  · ondansetron ODT 4 MG disintegrating tablet           Patient was placed in face mask in first look. Patient was wearing facemask when I entered the room and throughout our encounter. I wore full protective equipment throughout this patient encounter including a face mask, and gloves. Hand hygiene was performed before donning protective equipment and after removal when leaving the room.    Dictated utilizing Dragon dictation.      Note Disclaimer: At [facility], we believe that sharing information builds trust and better relationships. You are receiving this note because you recently visited [facility]. It is possible you will see health information before a provider has talked with you about it. This kind of information can be easy to misunderstand. To help you fully understand what it means for your health, we urge you to discuss this note with your provider.             Laurie Mancini PA  09/11/22 4992

## 2022-09-11 NOTE — ED TRIAGE NOTES
Pt reports NV and fever that started Monday. Pt reports taking 600mg of tylenol at 0200 this morning.      Pt was wearing a mask during assessment.  This RN wore appropriate PPE

## 2022-09-11 NOTE — ED PROVIDER NOTES
MD ATTESTATION NOTE    The ANGLE and I have discussed this patient's history, physical exam, and treatment plan.  I have reviewed the documentation and personally had a face to face interaction with the patient. I affirm the documentation and agree with the treatment and plan.  The attached note describes my personal findings.      I provided a substantive portion of the care of the patient.  I personally performed the physical exam in its entirety, and below are my findings.  For this patient encounter, the patient wore surgical mask, I wore full protective PPE including N95 and eye protection.      Brief HPI: Patient complains of nausea and vomiting for the past 1 week.  She also complains of intermittent fever and upper abdominal discomfort.  She has had a few episodes of diarrhea.  Denies cough, shortness of breath, chest pain, or dysuria.    PHYSICAL EXAM  ED Triage Vitals [09/11/22 0858]   Temp Heart Rate Resp BP SpO2   97.5 °F (36.4 °C) 74 16 103/75 99 %      Temp src Heart Rate Source Patient Position BP Location FiO2 (%)   Tympanic Monitor -- -- --         GENERAL: Awake, alert, oriented x3.  Well-developed, well-nourished and nontoxic-appearing female.  No acute distress  HENT: nares patent, dry mucous membranes  EYES: no scleral icterus  CV: regular rhythm, normal rate  RESPIRATORY: normal effort, clear to auscultation bilaterally  ABDOMEN: soft, nontender  MUSCULOSKELETAL: no deformity  NEURO: alert, moves all extremities, follows commands  PSYCH:  calm, cooperative  SKIN: warm, dry    Vital signs and nursing notes reviewed.    EKG          EKG time: 11:47 AM  Rhythm/Rate: Sinus bradycardia, rate 51  P waves and KS: LAE  QRS, axis: Normal  ST and T waves: Nonspecific ST/T wave changes in the anterior and lateral leads    Interpreted Contemporaneously by me at 11:49 AM, independently viewed  EKG is not significantly changed compared to prior EKG done on 2/8/2020      Plan: We will obtain labs, chest x-ray,  and CT abdomen/pelvis.  Patient will be given IV fluids and Zofran.    CT abdomen/pelvis shows colonic diverticulosis without evidence of inflammation.  CT is negative acute.  Chest x-ray is negative.    ED Course as of 09/11/22 1345   Sun Sep 11, 2022   1116 Care signed out to Dr. Hernandez [DC]   1153 CT Abdomen Pelvis With Contrast [WH]   1342 Test results discussed with the patient.  She is resting comfortably.  Work-up is unremarkable.  She will be discharged with prescriptions for Zofran and Bentyl.  Return precautions were discussed. [WH]      ED Course User Index  [DC] Laurie Mancini PA  [WH] Alfred Hernandez MD      Diagnosis Plan   1. Nausea and vomiting, unspecified vomiting type     2. Upper abdominal pain     3. Intermittent diarrhea     4. Fever, unspecified fever cause       DISCHARGE    Patient discharged in stable condition.    Reviewed implications of results, diagnosis, meds, responsibility to follow up, warning signs and symptoms of possible worsening, potential complications and reasons to return to ER, including worsening or persistent symptoms, chest pain, shortness of breath, or other concern.    Patient/Family voiced understanding of above instructions.    Discussed plan for discharge, as there is no emergent indication for admission. Patient referred to primary care provider for BP management due to today's BP. Pt/family is agreeable and understands need for follow up and repeat testing.  Pt is aware that discharge does not mean that nothing is wrong but it indicates no emergency is present that requires admission and they must continue care with follow-up as given below or physician of their choice.     FOLLOW-UP  Ira Mcadams, APRN  [address] [address] [phone]    Call in 2 days  If symptoms persist         Medication List      New Prescriptions    dicyclomine 20 MG tablet  Commonly known as: BENTYL  Take 1 tablet by mouth Every 6 (Six) Hours As  Needed (abdominal cramps).     ondansetron ODT 4 MG disintegrating tablet  Commonly known as: ZOFRAN-ODT  Place 1 tablet on the tongue Every 6 (Six) Hours As Needed for Nausea or Vomiting.           Where to Get Your Medications      These medications were sent to [id number] - [facility] [address] [address] AT SEC OF [place] [address] - [phone]  - [phone]   [address] [address], [facility] [address]    Phone: [phone]   · dicyclomine 20 MG tablet  · ondansetron ODT 4 MG disintegrating tablet            Alfred Hernandez MD  09/11/22 8854

## 2022-09-14 ENCOUNTER — TELEPHONE (OUTPATIENT)
Dept: GASTROENTEROLOGY | Facility: CLINIC | Age: 57
End: 2022-09-14

## 2022-09-14 NOTE — TELEPHONE ENCOUNTER
Received message on patient that Patient was in the Sunday ER with fever 99.8 was 102.0, vomiting pain under left rib. Patient would like to see you but nothing till October please advise thank you.    Can you please call to check in on patient to see how she is feeling after recent ER visit?

## 2022-09-15 ENCOUNTER — TELEPHONE (OUTPATIENT)
Dept: GASTROENTEROLOGY | Facility: CLINIC | Age: 57
End: 2022-09-15

## 2022-09-15 RX ORDER — PANTOPRAZOLE SODIUM 40 MG/1
40 TABLET, DELAYED RELEASE ORAL DAILY
Qty: 30 TABLET | Refills: 5 | Status: SHIPPED | OUTPATIENT
Start: 2022-09-15

## 2022-09-15 RX ORDER — SUCRALFATE 1 G/1
1 TABLET ORAL 2 TIMES DAILY
Qty: 60 TABLET | Refills: 1 | Status: SHIPPED | OUTPATIENT
Start: 2022-09-15 | End: 2022-09-15

## 2022-09-15 RX ORDER — SUCRALFATE 1 G/1
TABLET ORAL
Qty: 180 TABLET | Refills: 0 | Status: SHIPPED | OUTPATIENT
Start: 2022-09-15 | End: 2022-11-03

## 2022-09-15 RX ORDER — SUCRALFATE ORAL 1 G/10ML
1 SUSPENSION ORAL 3 TIMES DAILY
Qty: 1200 ML | Refills: 1 | Status: SHIPPED | OUTPATIENT
Start: 2022-09-15 | End: 2022-09-15

## 2022-09-15 NOTE — TELEPHONE ENCOUNTER
I would recommend starting pantoprazole and Carafate in case she may have developed an ulcer. I sent in prescriptions to her pharmacy.    Schedule follow up visit and call if symptoms do not improve or worsen.

## 2022-09-15 NOTE — TELEPHONE ENCOUNTER
PA denied for sucralfate suspension. Se below  Per your health plan's criteria, this drug is covered if you meet the following:  You have tried or cannot use the following drug: sucralfate tab 1 gm .  The information provided does not show that you meet the criteria listed above.  Can she have tablet form? Thank you

## 2022-09-16 LAB
BACTERIA SPEC AEROBE CULT: NORMAL
BACTERIA SPEC AEROBE CULT: NORMAL

## 2022-10-05 ENCOUNTER — PREP FOR SURGERY (OUTPATIENT)
Dept: SURGERY | Facility: SURGERY CENTER | Age: 57
End: 2022-10-05

## 2022-10-05 ENCOUNTER — OFFICE VISIT (OUTPATIENT)
Dept: GASTROENTEROLOGY | Facility: CLINIC | Age: 57
End: 2022-10-05

## 2022-10-05 VITALS
SYSTOLIC BLOOD PRESSURE: 120 MMHG | HEIGHT: 66 IN | DIASTOLIC BLOOD PRESSURE: 82 MMHG | HEART RATE: 60 BPM | OXYGEN SATURATION: 97 % | WEIGHT: 184 LBS | TEMPERATURE: 98 F | BODY MASS INDEX: 29.57 KG/M2

## 2022-10-05 DIAGNOSIS — R14.0 BLOATING: ICD-10-CM

## 2022-10-05 DIAGNOSIS — R11.2 NAUSEA AND VOMITING, UNSPECIFIED VOMITING TYPE: Primary | ICD-10-CM

## 2022-10-05 DIAGNOSIS — R11.2 NAUSEA AND VOMITING, UNSPECIFIED VOMITING TYPE: ICD-10-CM

## 2022-10-05 DIAGNOSIS — R10.12 LEFT UPPER QUADRANT ABDOMINAL PAIN: Primary | ICD-10-CM

## 2022-10-05 DIAGNOSIS — R10.12 LEFT UPPER QUADRANT ABDOMINAL PAIN: ICD-10-CM

## 2022-10-05 PROCEDURE — 99214 OFFICE O/P EST MOD 30 MIN: CPT | Performed by: NURSE PRACTITIONER

## 2022-10-05 RX ORDER — SODIUM CHLORIDE, SODIUM LACTATE, POTASSIUM CHLORIDE, CALCIUM CHLORIDE 600; 310; 30; 20 MG/100ML; MG/100ML; MG/100ML; MG/100ML
30 INJECTION, SOLUTION INTRAVENOUS CONTINUOUS PRN
Status: CANCELLED | OUTPATIENT
Start: 2022-10-05

## 2022-10-05 RX ORDER — SODIUM CHLORIDE 0.9 % (FLUSH) 0.9 %
3 SYRINGE (ML) INJECTION EVERY 12 HOURS SCHEDULED
Status: CANCELLED | OUTPATIENT
Start: 2022-10-05

## 2022-10-05 RX ORDER — SODIUM CHLORIDE 0.9 % (FLUSH) 0.9 %
10 SYRINGE (ML) INJECTION AS NEEDED
Status: CANCELLED | OUTPATIENT
Start: 2022-10-05

## 2022-10-05 RX ORDER — ESTRADIOL 0.5 MG/1
0.5 TABLET ORAL DAILY
Qty: 90 TABLET | Refills: 1 | Status: SHIPPED | OUTPATIENT
Start: 2022-10-05

## 2022-10-05 NOTE — PATIENT INSTRUCTIONS
Schedule EGD for further evaluation of symptoms.     Schedule gastric emptying study to assess for gastroparesis.

## 2022-10-05 NOTE — TELEPHONE ENCOUNTER
----- Message from Imani Mazariegos sent at 10/3/2022 10:56 AM EDT -----  Regarding: Estrace  I am in need of a refill on my Estrace .5mg 90 day supply. I have my yearly appt with you all in Feb of 2023. I come yearly unless a problem. Can this prescription be refilled?   Thank you

## 2022-10-05 NOTE — PROGRESS NOTES
"Chief Complaint   Patient presents with   • Abdominal Pain           History of Present Illness  Patient is a 57-year-old female who presents today for follow-up.  She has a history of GERD, esophagitis, colon polyps, and diverticulitis.  She was recently seen in the emergency department for nausea, vomiting, and epigastric pain.  CT scan was performed with no acute findings.    Patient presents today for follow-up.  She reports she has been experiencing symptoms over the last 7 months.  She has been experiencing abdominal pain at present to the left upper quadrant described as a burning sensation.  She has had increased gas, belching, and persistent nausea.    1 month ago her symptoms acutely worsened.  Pain worsened and she began vomiting.  This was when she was evaluated in the ER.    She contacted the office following this and was started on pantoprazole and sucralfate.  Symptoms have not improved since starting these medications.    The pain is generally worse after eating.  She reports early satiety.    She has not had any issues with diverticulitis over the last year.  She did meet with Dr. Boyd for consultation due to recurrent diverticulitis.  She was recommended to follow a weight loss program which she did and lost 20 pounds.  She has made significant dietary changes and has had no further diverticulitis since that time.     Result Review :       COLONOSCOPY (02/08/2021 12:06)   UPPER GI ENDOSCOPY (02/08/2021 12:06)   Lipase (09/11/2022 09:57)   CBC & Differential (09/11/2022 09:57)   Comprehensive Metabolic Panel (09/11/2022 09:57)   CT Abdomen Pelvis With Contrast (09/11/2022 11:09)   ED with Alfred Hernandez MD (09/11/2022)   Office Visit with Mandie Carlton APRN (10/13/2021)   CT Abdomen Pelvis With Contrast (06/03/2021 09:57)   Office Visit with Mandie Carlton APRN (06/03/2021)       Vital Signs:   /82   Pulse 60   Temp 98 °F (36.7 °C)   Ht 167.6 cm (66\")   Wt 83.5 kg (184 lb)  "  SpO2 97%   BMI 29.70 kg/m²     Body mass index is 29.7 kg/m².     Physical Exam  Vitals reviewed.   Constitutional:       General: She is not in acute distress.     Appearance: She is well-developed.   HENT:      Head: Normocephalic and atraumatic.   Pulmonary:      Effort: Pulmonary effort is normal. No respiratory distress.   Abdominal:      General: Abdomen is flat. Bowel sounds are normal. There is no distension.      Palpations: Abdomen is soft.      Tenderness: There is no abdominal tenderness.   Skin:     General: Skin is dry.      Coloration: Skin is not pale.   Neurological:      Mental Status: She is alert and oriented to person, place, and time.   Psychiatric:         Thought Content: Thought content normal.           Assessment and Plan    Diagnoses and all orders for this visit:    1. Left upper quadrant abdominal pain (Primary)  -     NM Gastric Emptying; Future    2. Nausea and vomiting, unspecified vomiting type  -     NM Gastric Emptying; Future    3. Bloating  -     NM Gastric Emptying; Future         Discussion  Patient presents today for follow-up with concerns about left upper quadrant pain, nausea, and increased gas.  Recommended EGD to assess for any evidence of gastritis, peptic ulcer disease, or H. pylori infection.  Recommend gastric emptying study to evaluate for gastroparesis.  If negative and symptoms persist, may consider hydrogen breath test to assess for small intestinal bacterial overgrowth.          Follow Up   Return for Follow up to review results after testing complete.    Patient Instructions   Schedule EGD for further evaluation of symptoms.     Schedule gastric emptying study to assess for gastroparesis.

## 2022-10-12 RX ORDER — FAMOTIDINE 40 MG/1
40 TABLET, FILM COATED ORAL DAILY
Qty: 30 TABLET | Refills: 5 | Status: SHIPPED | OUTPATIENT
Start: 2022-10-12

## 2022-11-03 ENCOUNTER — OFFICE VISIT (OUTPATIENT)
Dept: INTERNAL MEDICINE | Facility: CLINIC | Age: 57
End: 2022-11-03

## 2022-11-03 VITALS
SYSTOLIC BLOOD PRESSURE: 112 MMHG | WEIGHT: 182 LBS | HEIGHT: 66 IN | BODY MASS INDEX: 29.25 KG/M2 | OXYGEN SATURATION: 100 % | HEART RATE: 60 BPM | TEMPERATURE: 98 F | DIASTOLIC BLOOD PRESSURE: 74 MMHG

## 2022-11-03 DIAGNOSIS — Z20.828 RSV EXPOSURE: ICD-10-CM

## 2022-11-03 DIAGNOSIS — H65.111 ACUTE MUCOID OTITIS MEDIA OF RIGHT EAR: ICD-10-CM

## 2022-11-03 DIAGNOSIS — R05.1 ACUTE COUGH: ICD-10-CM

## 2022-11-03 DIAGNOSIS — R05.1 ACUTE COUGH: Primary | ICD-10-CM

## 2022-11-03 LAB
EXPIRATION DATE: NORMAL
EXPIRATION DATE: NORMAL
FLUAV AG NPH QL: NEGATIVE
FLUBV AG NPH QL: NEGATIVE
INTERNAL CONTROL: NORMAL
INTERNAL CONTROL: NORMAL
Lab: NORMAL
Lab: NORMAL
SARS-COV-2 AG UPPER RESP QL IA.RAPID: NOT DETECTED

## 2022-11-03 PROCEDURE — 99213 OFFICE O/P EST LOW 20 MIN: CPT | Performed by: NURSE PRACTITIONER

## 2022-11-03 PROCEDURE — 87804 INFLUENZA ASSAY W/OPTIC: CPT | Performed by: NURSE PRACTITIONER

## 2022-11-03 PROCEDURE — 87426 SARSCOV CORONAVIRUS AG IA: CPT | Performed by: NURSE PRACTITIONER

## 2022-11-03 RX ORDER — FLUTICASONE PROPIONATE 50 MCG
2 SPRAY, SUSPENSION (ML) NASAL DAILY
Qty: 15.8 ML | Refills: 0 | Status: SHIPPED | OUTPATIENT
Start: 2022-11-03 | End: 2022-11-03

## 2022-11-03 RX ORDER — FLUTICASONE PROPIONATE 50 MCG
SPRAY, SUSPENSION (ML) NASAL
Qty: 48 G | Refills: 1 | Status: SHIPPED | OUTPATIENT
Start: 2022-11-03

## 2022-11-03 RX ORDER — AMOXICILLIN AND CLAVULANATE POTASSIUM 875; 125 MG/1; MG/1
1 TABLET, FILM COATED ORAL 2 TIMES DAILY
Qty: 14 TABLET | Refills: 0 | Status: SHIPPED | OUTPATIENT
Start: 2022-11-03 | End: 2022-11-07 | Stop reason: SINTOL

## 2022-11-03 NOTE — PROGRESS NOTES
"Chief Complaint  Nasal Congestion, Cough, and Headache    Subjective        Imani Mazariegos presents to [facility]  History of Present Illness  Patient presents for evaluation of cough, congestion, right ear pain and sinus pain and pressure.  This is a 57-year-old female.  Symptoms began 5 days ago but her right ear pain has been ongoing for about 2 months.  She has been exposed to RSV by her grandson.  No fever, chills, shortness of breath or chest discomfort.  She has been using Flonase and Mucinex and increasing her fluids.  Denies development of other new issues today.  Cough  This is a recurrent problem. The current episode started in the past 7 days. The problem has been waxing and waning. The problem occurs hourly. The cough is non-productive. Associated symptoms include nasal congestion, postnasal drip and rhinorrhea. Pertinent negatives include no chest pain, chills, ear congestion, ear pain, fever, headaches, heartburn, hemoptysis, myalgias, rash, sore throat, shortness of breath, sweats, weight loss or wheezing. The symptoms are aggravated by lying down.       Objective   Vital Signs:  /74 (BP Location: Right arm, Patient Position: Sitting, Cuff Size: Adult)   Pulse 60   Temp 98 °F (36.7 °C) (Temporal)   Ht 167.6 cm (66\")   Wt 82.6 kg (182 lb)   SpO2 100%   BMI 29.38 kg/m²   Estimated body mass index is 29.38 kg/m² as calculated from the following:    Height as of this encounter: 167.6 cm (66\").    Weight as of this encounter: 82.6 kg (182 lb).          Physical Exam  Vitals and nursing note reviewed.   Constitutional:       General: She is not in acute distress.     Appearance: Normal appearance. She is well-developed. She is not ill-appearing, toxic-appearing or diaphoretic.   HENT:      Head: Normocephalic and atraumatic.      Right Ear: External ear normal. A middle ear effusion is present. Tympanic membrane is erythematous. Tympanic membrane is not retracted " or bulging.      Left Ear: External ear normal. A middle ear effusion is present. Tympanic membrane is not erythematous, retracted or bulging.      Nose:      Right Sinus: Maxillary sinus tenderness present. No frontal sinus tenderness.      Left Sinus: Maxillary sinus tenderness present. No frontal sinus tenderness.   Eyes:      Pupils: Pupils are equal, round, and reactive to light.   Cardiovascular:      Rate and Rhythm: Normal rate and regular rhythm.      Pulses: Normal pulses.      Heart sounds: Normal heart sounds.   Pulmonary:      Effort: Pulmonary effort is normal. No respiratory distress.      Breath sounds: Normal breath sounds. No stridor. No wheezing, rhonchi or rales.   Chest:      Chest wall: No tenderness.   Abdominal:      General: Bowel sounds are normal. There is no distension.      Palpations: Abdomen is soft.      Tenderness: There is no abdominal tenderness.   Musculoskeletal:         General: Normal range of motion.      Cervical back: Normal range of motion and neck supple.   Skin:     General: Skin is warm and dry.      Capillary Refill: Capillary refill takes less than 2 seconds.   Neurological:      General: No focal deficit present.      Mental Status: She is alert and oriented to person, place, and time. Mental status is at baseline.   Psychiatric:         Mood and Affect: Mood normal.         Behavior: Behavior normal.         Thought Content: Thought content normal.         Judgment: Judgment normal.        Result Review :  The following data was reviewed by: CASSIE Stallworth on 11/03/2022:  Common labs    Common Labs 2/2/22 2/2/22 2/2/22 9/11/22 9/11/22    0000 0000 0000 0957 0957   Glucose  99   124 (A)   BUN  23   11   Creatinine  1.00   0.80   eGFR Non  Am  62      eGFR African Am  72      Sodium  141   138   Potassium  4.7   3.8   Chloride  103   104   Calcium  9.6   9.0   Total Protein  7.1      Albumin  4.7   4.60   Total Bilirubin  0.4   0.6   Alkaline Phosphatase   67   75   AST (SGOT)  28   32   ALT (SGPT)  29   33   WBC 6.0   5.28    Hemoglobin 15.3   15.8    Hematocrit 45.6   48.9 (A)    Platelets 223   201    Total Cholesterol   195     Triglycerides   138     HDL Cholesterol   57     LDL Cholesterol    114 (A)     (A) Abnormal value       Comments are available for some flowsheets but are not being displayed.           Current outpatient and discharge medications have been reconciled for the patient.  Reviewed by: CASSIE Stallworth           Assessment and Plan   Diagnoses and all orders for this visit:    1. Acute cough (Primary)  -     POCT SARS-CoV-2 Antigen MARIUM  -     POCT Influenza A/B  -     fluticasone (Flonase) 50 MCG/ACT nasal spray; 2 sprays into the nostril(s) as directed by provider Daily.  Dispense: 15.8 mL; Refill: 0    2. RSV exposure  -     fluticasone (Flonase) 50 MCG/ACT nasal spray; 2 sprays into the nostril(s) as directed by provider Daily.  Dispense: 15.8 mL; Refill: 0    3. Acute mucoid otitis media of right ear  -     amoxicillin-clavulanate (Augmentin) 875-125 MG per tablet; Take 1 tablet by mouth 2 (Two) Times a Day for 7 days.  Dispense: 14 tablet; Refill: 0      This likely began as a viral etiology, I believe she has a true bacterial right-sided otitis media and I will treat with Augmentin for this.  We discussed using good symptom control as well including Mucinex, Flonase and increasing fluids, daily antihistamine.    Negative for flu and COVID today.    Follow-up as needed if symptoms persist or worsen and routinely at next scheduled office visit.       Follow Up   Return if symptoms worsen or fail to improve, for Next scheduled follow up.  Patient was given instructions and counseling regarding her condition or for health maintenance advice. Please see specific information pulled into the AVS if appropriate.       Answers for HPI/ROS submitted by the patient on 11/3/2022  What is the primary reason for your visit?: Cough

## 2022-11-07 DIAGNOSIS — H65.111 ACUTE MUCOID OTITIS MEDIA OF RIGHT EAR: Primary | ICD-10-CM

## 2022-11-07 RX ORDER — AZITHROMYCIN 250 MG/1
TABLET, FILM COATED ORAL
Qty: 6 TABLET | Refills: 0 | Status: SHIPPED | OUTPATIENT
Start: 2022-11-07 | End: 2023-01-30

## 2022-12-02 ENCOUNTER — TELEMEDICINE (OUTPATIENT)
Dept: INTERNAL MEDICINE | Facility: CLINIC | Age: 57
End: 2022-12-02

## 2022-12-02 DIAGNOSIS — J20.8 ACUTE BACTERIAL BRONCHITIS: Primary | ICD-10-CM

## 2022-12-02 DIAGNOSIS — B96.89 ACUTE BACTERIAL BRONCHITIS: Primary | ICD-10-CM

## 2022-12-02 PROCEDURE — 99442 PR PHYS/QHP TELEPHONE EVALUATION 11-20 MIN: CPT

## 2022-12-02 RX ORDER — DOXYCYCLINE HYCLATE 100 MG/1
100 CAPSULE ORAL 2 TIMES DAILY
Qty: 14 CAPSULE | Refills: 0 | Status: SHIPPED | OUTPATIENT
Start: 2022-12-02 | End: 2022-12-09

## 2022-12-02 NOTE — PATIENT INSTRUCTIONS
Start doxycycline 100 mg by mouth twice a day for 7 days. Continue Mucinex DM. Avoid OTC Cold and Flu products. Tylenol for fever reduction. Encourage nutrition. Stay hydrated with water. Rest.

## 2022-12-02 NOTE — PROGRESS NOTES
"Chief Complaint  No chief complaint on file.    Subjective        Imani Mazariegos presents to [facility]  History of Present Illness  57 y.o. female presenting via video visit with concerns of cough and congestion. Pt of Ira Mcadams APRN. Multiple family members have same symptoms since Thanksgiving. Previously treated 2 weeks ago with azithromycin for otitis media. Treating with Mucinex DM and OTC Cold and Flu with little relief.     URI   The current episode started in the past 7 days. The problem has been gradually worsening. The maximum temperature recorded prior to her arrival was 100.4 - 100.9 F. Associated symptoms include congestion, coughing, headaches and rhinorrhea. Pertinent negatives include no chest pain or wheezing. She has tried increased fluids and decongestant for the symptoms. The treatment provided mild relief.   Cough  This is a new problem. The current episode started in the past 7 days. The problem has been gradually worsening. The problem occurs every few minutes. The cough is productive of brown sputum. Associated symptoms include ear congestion, a fever, headaches, postnasal drip and rhinorrhea. Pertinent negatives include no chest pain or wheezing. She has tried rest and OTC cough suppressant for the symptoms.       Objective   Vital Signs:  There were no vitals taken for this visit.  Estimated body mass index is 29.38 kg/m² as calculated from the following:    Height as of 11/3/22: 167.6 cm (66\").    Weight as of 11/3/22: 82.6 kg (182 lb).          Physical Exam  Neurological:      Mental Status: She is oriented to person, place, and time.   Psychiatric:         Mood and Affect: Mood normal.         Behavior: Behavior normal.         Thought Content: Thought content normal.         Judgment: Judgment normal.        Result Review :           Common labs    Common Labs 2/2/22 2/2/22 2/2/22 9/11/22 9/11/22    0000 0000 0000 0957 0957   Glucose  99   124 (A) "   BUN  23   11   Creatinine  1.00   0.80   eGFR Non  Am  62      eGFR African Am  72      Sodium  141   138   Potassium  4.7   3.8   Chloride  103   104   Calcium  9.6   9.0   Total Protein  7.1      Albumin  4.7   4.60   Total Bilirubin  0.4   0.6   Alkaline Phosphatase  67   75   AST (SGOT)  28   32   ALT (SGPT)  29   33   WBC 6.0   5.28    Hemoglobin 15.3   15.8    Hematocrit 45.6   48.9 (A)    Platelets 223   201    Total Cholesterol   195     Triglycerides   138     HDL Cholesterol   57     LDL Cholesterol    114 (A)     (A) Abnormal value       Comments are available for some flowsheets but are not being displayed.           Current Outpatient Medications on File Prior to Visit   Medication Sig Dispense Refill   • azithromycin (Zithromax Z-Sascha) 250 MG tablet Take 2 tablets by mouth on day 1, then 1 tablet daily on days 2-5 6 tablet 0   • estradiol (ESTRACE) 0.5 MG tablet Take 1 tablet by mouth Daily. 90 tablet 1   • famotidine (PEPCID) 40 MG tablet TAKE 1 TABLET BY MOUTH DAILY 30 tablet 5   • fluticasone (FLONASE) 50 MCG/ACT nasal spray USE 2 SPRAYS IN EACH NOSTRIL DAILY 48 g 1   • ondansetron ODT (ZOFRAN-ODT) 4 MG disintegrating tablet Place 1 tablet on the tongue Every 6 (Six) Hours As Needed for Nausea or Vomiting. 20 tablet 0   • pantoprazole (PROTONIX) 40 MG EC tablet Take 1 tablet by mouth Daily. 30 tablet 5   • Psyllium (METAMUCIL PO) Take  by mouth Every Other Day.     • tamsulosin (FLOMAX) 0.4 MG capsule 24 hr capsule Take 1 capsule by mouth Daily.     • VITAMIN D PO Take 1 tablet by mouth Daily.       No current facility-administered medications on file prior to visit.                 Assessment and Plan   Diagnoses and all orders for this visit:    1. Acute bacterial bronchitis (Primary)      Patient Instructions   Start doxycycline 100 mg by mouth twice a day for 7 days. Continue Mucinex DM. Avoid OTC Cold and Flu products. Tylenol for fever reduction. Encourage nutrition. Stay hydrated with  water. Rest.            Follow Up   Return if symptoms worsen or fail to improve.  Patient was given instructions and counseling regarding her condition or for health maintenance advice. Please see specific information pulled into the AVS if appropriate.     You have chosen to receive care through a telehealth visit.  Do you consent to use a video/audio connection for your medical care today? Yes    During visit, patient was at her residence and provider at medical practice office.    Unable to complete visit using a video connection to the patient. A phone visit was used to complete this visits. Total time of discussion was 15 minutes.

## 2022-12-30 ENCOUNTER — TELEPHONE (OUTPATIENT)
Dept: GASTROENTEROLOGY | Facility: CLINIC | Age: 57
End: 2022-12-30
Payer: COMMERCIAL

## 2022-12-30 NOTE — TELEPHONE ENCOUNTER
Called and spoke with patient regarding gastric emptying study. Pt reports that she is not having symptoms at this time and is doing better since starting medication. Does not need at this time. EL

## 2023-01-25 ENCOUNTER — TELEPHONE (OUTPATIENT)
Dept: INTERNAL MEDICINE | Facility: CLINIC | Age: 58
End: 2023-01-25

## 2023-01-25 NOTE — TELEPHONE ENCOUNTER
Hub staff attempted to follow warm transfer process and was unsuccessful     Caller: Imani Mazariegos    Relationship to patient: Self    Best call back number: [phone]    Patient is needing: THE PATIENT STATES THAT SHE IS EXPERIENCING A FEVER/CHILLS, BODY ACHES, AND A SORE THROAT ON THE RIGHT SIDE. THE PATIENT WOULD LIKE TO GET SEEN VIRTUALLY FOR THESE SYMPTOMS. THE PATIENT WAS PREVIOUSLY WITH BASIL LOPEZ.

## 2023-01-26 ENCOUNTER — TELEMEDICINE (OUTPATIENT)
Dept: INTERNAL MEDICINE | Facility: CLINIC | Age: 58
End: 2023-01-26
Payer: COMMERCIAL

## 2023-01-26 DIAGNOSIS — J06.9 VIRAL UPPER RESPIRATORY TRACT INFECTION: Primary | ICD-10-CM

## 2023-01-26 PROCEDURE — 99213 OFFICE O/P EST LOW 20 MIN: CPT

## 2023-01-26 NOTE — PATIENT INSTRUCTIONS
Treat fever with Tylenol.  Stay hydrated with water.  Encourage nutrition.  Rest.  Recommend avoiding childcare if fever present in previous 24 hours without treating with Tylenol.  Follow-up as needed.

## 2023-01-26 NOTE — PROGRESS NOTES
"Chief Complaint  URI    Subjective        Imani Mazariegos presents to [facility]  History of Present Illness  57-year-old female presenting with upper respiratory symptoms.  Patient of CASSIE Stallworth.  Tested negative for COVID with at home test yesterday.  Symptoms started 2 days ago including body aches.  Had chills overnight and spiked a 103 fever.  Treated with Tylenol.  Has been sweating and fever is now reduced to 101.  Body aches have improved.  Other symptoms includes a sore throat and decreased appetite.  States daughter and  had a recent stomach virus.  Son-in-law had similar symptoms as patient's a couple days ago as well.  His symptoms improved quickly without treatment.  Patient concerned about being contagious around grandchild.  Denies chest pain, difficulty breathing, cough, ear pain, sinus pressure, changes in taste or smell.  URI         Objective   Vital Signs:  There were no vitals taken for this visit.  Estimated body mass index is 29.38 kg/m² as calculated from the following:    Height as of 11/3/22: 167.6 cm (66\").    Weight as of 11/3/22: 82.6 kg (182 lb).             Physical Exam  Constitutional:       Appearance: Normal appearance.   Neurological:      General: No focal deficit present.      Mental Status: She is alert and oriented to person, place, and time.   Psychiatric:         Mood and Affect: Mood normal.         Behavior: Behavior normal.         Thought Content: Thought content normal.         Judgment: Judgment normal.        Result Review :    Common labs    Common Labs 2/2/22 2/2/22 2/2/22 9/11/22 9/11/22    0000 0000 0000 0957 0957   Glucose  99   124 (A)   BUN  23   11   Creatinine  1.00   0.80   eGFR Non  Am  62      eGFR African Am  72      Sodium  141   138   Potassium  4.7   3.8   Chloride  103   104   Calcium  9.6   9.0   Total Protein  7.1      Albumin  4.7   4.60   Total Bilirubin  0.4   0.6   Alkaline Phosphatase  67   " 75   AST (SGOT)  28   32   ALT (SGPT)  29   33   WBC 6.0   5.28    Hemoglobin 15.3   15.8    Hematocrit 45.6   48.9 (A)    Platelets 223   201    Total Cholesterol   195     Triglycerides   138     HDL Cholesterol   57     LDL Cholesterol    114 (A)     (A) Abnormal value       Comments are available for some flowsheets but are not being displayed.               Current Outpatient Medications on File Prior to Visit   Medication Sig Dispense Refill   • azithromycin (Zithromax Z-Sascha) 250 MG tablet Take 2 tablets by mouth on day 1, then 1 tablet daily on days 2-5 6 tablet 0   • estradiol (ESTRACE) 0.5 MG tablet Take 1 tablet by mouth Daily. 90 tablet 1   • famotidine (PEPCID) 40 MG tablet TAKE 1 TABLET BY MOUTH DAILY 30 tablet 5   • fluticasone (FLONASE) 50 MCG/ACT nasal spray USE 2 SPRAYS IN EACH NOSTRIL DAILY 48 g 1   • ondansetron ODT (ZOFRAN-ODT) 4 MG disintegrating tablet Place 1 tablet on the tongue Every 6 (Six) Hours As Needed for Nausea or Vomiting. 20 tablet 0   • pantoprazole (PROTONIX) 40 MG EC tablet Take 1 tablet by mouth Daily. 30 tablet 5   • Psyllium (METAMUCIL PO) Take  by mouth Every Other Day.     • tamsulosin (FLOMAX) 0.4 MG capsule 24 hr capsule Take 1 capsule by mouth Daily.     • VITAMIN D PO Take 1 tablet by mouth Daily.       No current facility-administered medications on file prior to visit.              Assessment and Plan   Diagnoses and all orders for this visit:    1. Viral upper respiratory tract infection (Primary)      Patient Instructions   Treat fever with Tylenol.  Stay hydrated with water.  Encourage nutrition.  Rest.  Recommend avoiding childcare if fever present in previous 24 hours without treating with Tylenol.  Follow-up as needed.           Follow Up   Return if symptoms worsen or fail to improve.  Patient was given instructions and counseling regarding her condition or for health maintenance advice. Please see specific information pulled into the AVS if appropriate.     You  have chosen to receive care through a telehealth visit.  Do you consent to use a video/audio connection for your medical care today? Yes    During visit, patient was at her residence and provider at medical practice office.

## 2023-01-30 ENCOUNTER — OFFICE VISIT (OUTPATIENT)
Dept: INTERNAL MEDICINE | Facility: CLINIC | Age: 58
End: 2023-01-30
Payer: COMMERCIAL

## 2023-01-30 VITALS
HEART RATE: 65 BPM | HEIGHT: 66 IN | DIASTOLIC BLOOD PRESSURE: 70 MMHG | TEMPERATURE: 97.7 F | SYSTOLIC BLOOD PRESSURE: 126 MMHG | OXYGEN SATURATION: 98 % | BODY MASS INDEX: 29.41 KG/M2 | WEIGHT: 183 LBS

## 2023-01-30 DIAGNOSIS — J02.9 SORE THROAT: Primary | ICD-10-CM

## 2023-01-30 DIAGNOSIS — R59.9 ENLARGED LYMPH NODE: ICD-10-CM

## 2023-01-30 DIAGNOSIS — J02.0 STREP THROAT: ICD-10-CM

## 2023-01-30 LAB
EXPIRATION DATE: ABNORMAL
INTERNAL CONTROL: ABNORMAL
Lab: ABNORMAL
S PYO AG THROAT QL: POSITIVE

## 2023-01-30 PROCEDURE — 87880 STREP A ASSAY W/OPTIC: CPT

## 2023-01-30 PROCEDURE — 99213 OFFICE O/P EST LOW 20 MIN: CPT

## 2023-01-30 RX ORDER — AMOXICILLIN 500 MG/1
1000 CAPSULE ORAL 2 TIMES DAILY
Qty: 40 CAPSULE | Refills: 0 | Status: SHIPPED | OUTPATIENT
Start: 2023-01-30 | End: 2023-02-09

## 2023-01-30 NOTE — PROGRESS NOTES
"Chief Complaint  Fever (Off and fever, night sweats) and Swollen Glands (On right side)    Subjective        Imani Mazariegos presents to [facility]  History of Present Illness  57-year-old female presenting with fever and swollen glands.  Previous seen via telemedicine with fever, body aches and sore throat.  Body aches have resolved.  Fever starts at 5PM, then night sweats.  Pools of sweat over the night.  Swollen right cervical lymph node.  Multiple family members have been sick around her as of late.  Denies difficulty swallowing, difficulty breathing, chest pain, sinus congestion.    Fever     Swollen Glands  Associated symptoms include a fever and swollen glands.       Objective   Vital Signs:  /70   Pulse 65   Temp 97.7 °F (36.5 °C)   Ht 167.6 cm (66\")   Wt 83 kg (183 lb)   SpO2 98%   BMI 29.54 kg/m²   Estimated body mass index is 29.54 kg/m² as calculated from the following:    Height as of this encounter: 167.6 cm (66\").    Weight as of this encounter: 83 kg (183 lb).             Physical Exam  Vitals reviewed.   Constitutional:       Appearance: Normal appearance.   HENT:      Head: Normocephalic.   Musculoskeletal:         General: Normal range of motion.      Cervical back: Normal range of motion.   Lymphadenopathy:      Cervical: Cervical adenopathy present.   Skin:     General: Skin is warm and dry.      Capillary Refill: Capillary refill takes less than 2 seconds.   Neurological:      General: No focal deficit present.      Mental Status: She is alert and oriented to person, place, and time.   Psychiatric:         Mood and Affect: Mood normal.         Behavior: Behavior normal.         Thought Content: Thought content normal.         Judgment: Judgment normal.        Result Review :    Common labs    Common Labs 9/11/22 9/11/22    0957 0957   Glucose  124 (A)   BUN  11   Creatinine  0.80   Sodium  138   Potassium  3.8   Chloride  104   Calcium  9.0   Albumin  " 4.60   Total Bilirubin  0.6   Alkaline Phosphatase  75   AST (SGOT)  32   ALT (SGPT)  33   WBC 5.28    Hemoglobin 15.8    Hematocrit 48.9 (A)    Platelets 201    (A) Abnormal value            Current Outpatient Medications on File Prior to Visit   Medication Sig Dispense Refill   • estradiol (ESTRACE) 0.5 MG tablet Take 1 tablet by mouth Daily. 90 tablet 1   • famotidine (PEPCID) 40 MG tablet TAKE 1 TABLET BY MOUTH DAILY 30 tablet 5   • fluticasone (FLONASE) 50 MCG/ACT nasal spray USE 2 SPRAYS IN EACH NOSTRIL DAILY 48 g 1   • ondansetron ODT (ZOFRAN-ODT) 4 MG disintegrating tablet Place 1 tablet on the tongue Every 6 (Six) Hours As Needed for Nausea or Vomiting. 20 tablet 0   • pantoprazole (PROTONIX) 40 MG EC tablet Take 1 tablet by mouth Daily. 30 tablet 5   • Psyllium (METAMUCIL PO) Take  by mouth Every Other Day.     • tamsulosin (FLOMAX) 0.4 MG capsule 24 hr capsule Take 1 capsule by mouth Daily.     • VITAMIN D PO Take 1 tablet by mouth Daily.     • [DISCONTINUED] azithromycin (Zithromax Z-Sascha) 250 MG tablet Take 2 tablets by mouth on day 1, then 1 tablet daily on days 2-5 6 tablet 0     No current facility-administered medications on file prior to visit.                    Assessment and Plan   Diagnoses and all orders for this visit:    1. Sore throat (Primary)  -     POCT rapid strep A    2. Enlarged lymph node  -     Cancel: TSH  -     Cancel: T4, Free  -     Cancel: Mononucleosis Screen  -     Cancel: CBC & Differential  -     POCT rapid strep A    3. Strep throat  -     amoxicillin (AMOXIL) 500 MG capsule; Take 2 capsules by mouth 2 (Two) Times a Day for 10 days.  Dispense: 40 capsule; Refill: 0      Patient Instructions   Start amoxicillin 1000 mg twice a day for 10 days. Continue guaifenesin daily. Apply warm compress to neck as needed. Encourage nutrition. Stay hydrated with water. Follow-up in 4 weeks for annual and labs.             Follow Up   Return in about 4 weeks (around 2/27/2023) for Annual  physical and labs..  Patient was given instructions and counseling regarding her condition or for health maintenance advice. Please see specific information pulled into the AVS if appropriate.

## 2023-01-30 NOTE — PATIENT INSTRUCTIONS
Start amoxicillin 1000 mg twice a day for 10 days. Continue guaifenesin daily. Apply warm compress to neck as needed. Encourage nutrition. Stay hydrated with water. Follow-up in 4 weeks for annual and labs.

## 2023-02-13 ENCOUNTER — APPOINTMENT (OUTPATIENT)
Dept: WOMENS IMAGING | Facility: HOSPITAL | Age: 58
End: 2023-02-13
Payer: COMMERCIAL

## 2023-02-13 PROCEDURE — 77063 BREAST TOMOSYNTHESIS BI: CPT | Performed by: RADIOLOGY

## 2023-02-13 PROCEDURE — 77067 SCR MAMMO BI INCL CAD: CPT | Performed by: RADIOLOGY

## 2023-02-24 ENCOUNTER — OFFICE VISIT (OUTPATIENT)
Dept: INTERNAL MEDICINE | Facility: CLINIC | Age: 58
End: 2023-02-24
Payer: COMMERCIAL

## 2023-02-24 VITALS
SYSTOLIC BLOOD PRESSURE: 104 MMHG | DIASTOLIC BLOOD PRESSURE: 62 MMHG | OXYGEN SATURATION: 99 % | HEIGHT: 66 IN | HEART RATE: 50 BPM | BODY MASS INDEX: 29.41 KG/M2 | WEIGHT: 183 LBS | TEMPERATURE: 97.4 F

## 2023-02-24 DIAGNOSIS — Z00.00 ANNUAL PHYSICAL EXAM: Primary | ICD-10-CM

## 2023-02-24 DIAGNOSIS — E78.5 HYPERLIPIDEMIA, UNSPECIFIED HYPERLIPIDEMIA TYPE: ICD-10-CM

## 2023-02-24 LAB
ALBUMIN SERPL-MCNC: 4.8 G/DL (ref 3.5–5.2)
ALBUMIN/GLOB SERPL: 1.9 G/DL
ALP SERPL-CCNC: 79 U/L (ref 39–117)
ALT SERPL-CCNC: 18 U/L (ref 1–33)
APPEARANCE UR: CLEAR
AST SERPL-CCNC: 21 U/L (ref 1–32)
BASOPHILS # BLD AUTO: 0.03 10*3/MM3 (ref 0–0.2)
BASOPHILS NFR BLD AUTO: 0.4 % (ref 0–1.5)
BILIRUB SERPL-MCNC: 0.7 MG/DL (ref 0–1.2)
BILIRUB UR QL STRIP: NEGATIVE
BUN SERPL-MCNC: 18 MG/DL (ref 6–20)
BUN/CREAT SERPL: 21.4 (ref 7–25)
CALCIUM SERPL-MCNC: 9.8 MG/DL (ref 8.6–10.5)
CHLORIDE SERPL-SCNC: 104 MMOL/L (ref 98–107)
CHOLEST SERPL-MCNC: 215 MG/DL (ref 0–200)
CHOLEST/HDLC SERPL: 3.31 {RATIO}
CO2 SERPL-SCNC: 26.4 MMOL/L (ref 22–29)
COLOR UR: YELLOW
CREAT SERPL-MCNC: 0.84 MG/DL (ref 0.57–1)
EGFRCR SERPLBLD CKD-EPI 2021: 80.2 ML/MIN/1.73
EOSINOPHIL # BLD AUTO: 0.05 10*3/MM3 (ref 0–0.4)
EOSINOPHIL NFR BLD AUTO: 0.7 % (ref 0.3–6.2)
ERYTHROCYTE [DISTWIDTH] IN BLOOD BY AUTOMATED COUNT: 12.3 % (ref 12.3–15.4)
GLOBULIN SER CALC-MCNC: 2.5 GM/DL
GLUCOSE SERPL-MCNC: 86 MG/DL (ref 65–99)
GLUCOSE UR QL STRIP: NEGATIVE
HCT VFR BLD AUTO: 45.1 % (ref 34–46.6)
HDLC SERPL-MCNC: 65 MG/DL (ref 40–60)
HGB BLD-MCNC: 14.8 G/DL (ref 12–15.9)
HGB UR QL STRIP: NEGATIVE
IMM GRANULOCYTES # BLD AUTO: 0.02 10*3/MM3 (ref 0–0.05)
IMM GRANULOCYTES NFR BLD AUTO: 0.3 % (ref 0–0.5)
KETONES UR QL STRIP: NEGATIVE
LDLC SERPL CALC-MCNC: 131 MG/DL (ref 0–100)
LEUKOCYTE ESTERASE UR QL STRIP: NEGATIVE
LYMPHOCYTES # BLD AUTO: 1.48 10*3/MM3 (ref 0.7–3.1)
LYMPHOCYTES NFR BLD AUTO: 19.3 % (ref 19.6–45.3)
MCH RBC QN AUTO: 30.7 PG (ref 26.6–33)
MCHC RBC AUTO-ENTMCNC: 32.8 G/DL (ref 31.5–35.7)
MCV RBC AUTO: 93.6 FL (ref 79–97)
MONOCYTES # BLD AUTO: 0.63 10*3/MM3 (ref 0.1–0.9)
MONOCYTES NFR BLD AUTO: 8.2 % (ref 5–12)
NEUTROPHILS # BLD AUTO: 5.45 10*3/MM3 (ref 1.7–7)
NEUTROPHILS NFR BLD AUTO: 71.1 % (ref 42.7–76)
NITRITE UR QL STRIP: NEGATIVE
NRBC BLD AUTO-RTO: 0 /100 WBC (ref 0–0.2)
PH UR STRIP: 6 [PH] (ref 5–8)
PLATELET # BLD AUTO: 230 10*3/MM3 (ref 140–450)
POTASSIUM SERPL-SCNC: 5.4 MMOL/L (ref 3.5–5.2)
PROT SERPL-MCNC: 7.3 G/DL (ref 6–8.5)
PROT UR QL STRIP: NEGATIVE
RBC # BLD AUTO: 4.82 10*6/MM3 (ref 3.77–5.28)
SODIUM SERPL-SCNC: 139 MMOL/L (ref 136–145)
SP GR UR STRIP: 1.01 (ref 1–1.03)
T4 FREE SERPL-MCNC: 1.21 NG/DL (ref 0.93–1.7)
TRIGL SERPL-MCNC: 109 MG/DL (ref 0–150)
TSH SERPL DL<=0.005 MIU/L-ACNC: 2.48 UIU/ML (ref 0.27–4.2)
UROBILINOGEN UR STRIP-MCNC: NORMAL MG/DL
VLDLC SERPL CALC-MCNC: 19 MG/DL (ref 5–40)
WBC # BLD AUTO: 7.66 10*3/MM3 (ref 3.4–10.8)

## 2023-02-24 PROCEDURE — 99396 PREV VISIT EST AGE 40-64: CPT

## 2023-02-24 NOTE — PROGRESS NOTES
"Chief Complaint  Annual Exam    Subjective        Imani Mazariegos presents to [facility]  History of Present Illness  57-year-old female presenting for annual exam.  Walks every day to relieve stress about 2 to 3 miles.  Frequently engages with her [id number] grandson.  Has multiple other grandchildren that live outside of the state.  G[facility] with [facility] which has already been completed this year.  They are continuing to monitor a spot that has not changed.  No other health concerns at this time.        Objective   Vital Signs:  /62   Pulse 50   Temp 97.4 °F (36.3 °C)   Ht 167.6 cm (66\")   Wt 83 kg (183 lb)   SpO2 99%   BMI 29.54 kg/m²   Estimated body mass index is 29.54 kg/m² as calculated from the following:    Height as of this encounter: 167.6 cm (66\").    Weight as of this encounter: 83 kg (183 lb).             Physical Exam  Vitals reviewed.   Constitutional:       Appearance: Normal appearance.   HENT:      Head: Normocephalic.      Right Ear: Tympanic membrane normal.      Left Ear: Tympanic membrane normal.      Nose: Nose normal.      Mouth/Throat:      Mouth: Mucous membranes are moist.      Pharynx: Oropharynx is clear.   Eyes:      Pupils: Pupils are equal, round, and reactive to light.   Cardiovascular:      Rate and Rhythm: Normal rate.      Pulses: Normal pulses.      Heart sounds: Normal heart sounds.   Pulmonary:      Effort: Pulmonary effort is normal.      Breath sounds: Normal breath sounds.   Abdominal:      General: Bowel sounds are normal.      Palpations: Abdomen is soft.   Musculoskeletal:         General: Normal range of motion.      Cervical back: Normal range of motion.   Skin:     General: Skin is warm and dry.      Capillary Refill: Capillary refill takes less than 2 seconds.   Neurological:      General: No focal deficit present.      Mental Status: She is alert and oriented to person, place, and time. "   Psychiatric:         Mood and Affect: Mood normal.         Behavior: Behavior normal.         Thought Content: Thought content normal.         Judgment: Judgment normal.        Result Review :    Common labs    Common Labs 9/11/22 9/11/22    0957 0957   Glucose  124 (A)   BUN  11   Creatinine  0.80   Sodium  138   Potassium  3.8   Chloride  104   Calcium  9.0   Albumin  4.60   Total Bilirubin  0.6   Alkaline Phosphatase  75   AST (SGOT)  32   ALT (SGPT)  33   WBC 5.28    Hemoglobin 15.8    Hematocrit 48.9 (A)    Platelets 201    (A) Abnormal value            Current Outpatient Medications on File Prior to Visit   Medication Sig Dispense Refill   • estradiol (ESTRACE) 0.5 MG tablet Take 1 tablet by mouth Daily. 90 tablet 1   • famotidine (PEPCID) 40 MG tablet TAKE 1 TABLET BY MOUTH DAILY 30 tablet 5   • fluticasone (FLONASE) 50 MCG/ACT nasal spray USE 2 SPRAYS IN EACH NOSTRIL DAILY 48 g 1   • ondansetron ODT (ZOFRAN-ODT) 4 MG disintegrating tablet Place 1 tablet on the tongue Every 6 (Six) Hours As Needed for Nausea or Vomiting. 20 tablet 0   • pantoprazole (PROTONIX) 40 MG EC tablet Take 1 tablet by mouth Daily. 30 tablet 5   • Psyllium (METAMUCIL PO) Take  by mouth Every Other Day.     • tamsulosin (FLOMAX) 0.4 MG capsule 24 hr capsule Take 1 capsule by mouth Daily.     • VITAMIN D PO Take 1 tablet by mouth Daily.       No current facility-administered medications on file prior to visit.                    Assessment and Plan   Diagnoses and all orders for this visit:    1. Annual physical exam (Primary)  -     CBC & Differential  -     Comprehensive Metabolic Panel  -     TSH  -     T4, Free  -     Urinalysis With Microscopic If Indicated (No Culture) - Urine, Clean Catch  -     Lipid Panel With / Chol / HDL Ratio    2. Hyperlipidemia, unspecified hyperlipidemia type      Patient Instructions   Continue medications as prescribed. Stay hydrated with water.  Labs today. Follow-up next year for annual and labs.      The patient was counseled regarding nutrition, physical activity, healthy weight, injury prevention, misuse of tobacco, alcohol and illicit drugs, mental health, immunizations, and screenings.           Follow Up   Return in about 1 year (around 2/24/2024) for Annual physical.  Patient was given instructions and counseling regarding her condition or for health maintenance advice. Please see specific information pulled into the AVS if appropriate.

## 2023-02-24 NOTE — PATIENT INSTRUCTIONS
Continue medications as prescribed. Stay hydrated with water.  Labs today. Follow-up next year for annual and labs.

## 2023-04-11 RX ORDER — PANTOPRAZOLE SODIUM 40 MG/1
40 TABLET, DELAYED RELEASE ORAL DAILY
Qty: 30 TABLET | Refills: 5 | Status: SHIPPED | OUTPATIENT
Start: 2023-04-11

## 2023-04-13 NOTE — SIGNIFICANT NOTE
Education provided the Patient on the following:    - Nothing to Eat or Drink after MN the night before the procedure  -You will need to have someone drive you home after your EGD and remain with you for 24 hours after the EGD  - The date of your EGD, your are welcome to have one visitor at bedside or remain within 10-15 minutes of Mandae[facility] [place]  -Please wear warm socks when you arrive for your EGD  -Remove all jewelry and leave any valuables before arriving on the date of your procedure (all will have to be removed before leaving preop)  -You will need to arrive at 1100 on 4/18/23 EGD  -Feel free to contact us at: [phone] with any additional questions/concerns

## 2023-04-17 ENCOUNTER — PREP FOR SURGERY (OUTPATIENT)
Dept: SURGERY | Facility: SURGERY CENTER | Age: 58
End: 2023-04-17
Payer: COMMERCIAL

## 2023-04-17 DIAGNOSIS — R11.2 NAUSEA AND VOMITING, UNSPECIFIED VOMITING TYPE: Primary | ICD-10-CM

## 2023-04-17 DIAGNOSIS — K21.9 GASTROESOPHAGEAL REFLUX DISEASE, UNSPECIFIED WHETHER ESOPHAGITIS PRESENT: Chronic | ICD-10-CM

## 2023-04-17 DIAGNOSIS — R14.0 BLOATING: ICD-10-CM

## 2023-04-18 ENCOUNTER — HOSPITAL ENCOUNTER (OUTPATIENT)
Facility: SURGERY CENTER | Age: 58
Setting detail: HOSPITAL OUTPATIENT SURGERY
Discharge: HOME | End: 2023-04-18
Attending: INTERNAL MEDICINE | Admitting: INTERNAL MEDICINE
Payer: COMMERCIAL

## 2023-04-18 ENCOUNTER — ANESTHESIA EVENT (OUTPATIENT)
Dept: SURGERY | Facility: SURGERY CENTER | Age: 58
End: 2023-04-18
Payer: COMMERCIAL

## 2023-04-18 ENCOUNTER — ANESTHESIA (OUTPATIENT)
Dept: SURGERY | Facility: SURGERY CENTER | Age: 58
End: 2023-04-18
Payer: COMMERCIAL

## 2023-04-18 VITALS
WEIGHT: 180 LBS | RESPIRATION RATE: 16 BRPM | TEMPERATURE: 98.7 F | DIASTOLIC BLOOD PRESSURE: 67 MMHG | OXYGEN SATURATION: 100 % | SYSTOLIC BLOOD PRESSURE: 133 MMHG | HEART RATE: 41 BPM | BODY MASS INDEX: 28.93 KG/M2 | HEIGHT: 66 IN

## 2023-04-18 DIAGNOSIS — R10.12 LEFT UPPER QUADRANT ABDOMINAL PAIN: ICD-10-CM

## 2023-04-18 DIAGNOSIS — R11.2 NAUSEA AND VOMITING, UNSPECIFIED VOMITING TYPE: ICD-10-CM

## 2023-04-18 DIAGNOSIS — R14.0 BLOATING: ICD-10-CM

## 2023-04-18 PROCEDURE — 88305 TISSUE EXAM BY PATHOLOGIST: CPT | Performed by: INTERNAL MEDICINE

## 2023-04-18 PROCEDURE — 43239 EGD BIOPSY SINGLE/MULTIPLE: CPT | Performed by: INTERNAL MEDICINE

## 2023-04-18 PROCEDURE — 25010000002 PROPOFOL 1000 MG/100ML EMULSION: Performed by: ANESTHESIOLOGY

## 2023-04-18 PROCEDURE — 25010000002 PROPOFOL 10 MG/ML EMULSION: Performed by: ANESTHESIOLOGY

## 2023-04-18 RX ORDER — THIAMINE HCL 100 MG
TABLET ORAL DAILY
COMMUNITY

## 2023-04-18 RX ORDER — SODIUM CHLORIDE, SODIUM LACTATE, POTASSIUM CHLORIDE, CALCIUM CHLORIDE 600; 310; 30; 20 MG/100ML; MG/100ML; MG/100ML; MG/100ML
30 INJECTION, SOLUTION INTRAVENOUS CONTINUOUS PRN
Status: DISCONTINUED | OUTPATIENT
Start: 2023-04-18 | End: 2023-04-18 | Stop reason: HOSPADM

## 2023-04-18 RX ORDER — SODIUM CHLORIDE 0.9 % (FLUSH) 0.9 %
3 SYRINGE (ML) INJECTION EVERY 12 HOURS SCHEDULED
Status: DISCONTINUED | OUTPATIENT
Start: 2023-04-18 | End: 2023-04-18 | Stop reason: HOSPADM

## 2023-04-18 RX ORDER — SODIUM CHLORIDE 0.9 % (FLUSH) 0.9 %
10 SYRINGE (ML) INJECTION AS NEEDED
Status: DISCONTINUED | OUTPATIENT
Start: 2023-04-18 | End: 2023-04-18 | Stop reason: HOSPADM

## 2023-04-18 RX ORDER — SODIUM CHLORIDE, SODIUM LACTATE, POTASSIUM CHLORIDE, CALCIUM CHLORIDE 600; 310; 30; 20 MG/100ML; MG/100ML; MG/100ML; MG/100ML
1000 INJECTION, SOLUTION INTRAVENOUS CONTINUOUS
Status: DISCONTINUED | OUTPATIENT
Start: 2023-04-18 | End: 2023-04-18 | Stop reason: HOSPADM

## 2023-04-18 RX ORDER — PROPOFOL 10 MG/ML
INJECTION, EMULSION INTRAVENOUS AS NEEDED
Status: DISCONTINUED | OUTPATIENT
Start: 2023-04-18 | End: 2023-04-18 | Stop reason: SURG

## 2023-04-18 RX ORDER — LIDOCAINE HYDROCHLORIDE 20 MG/ML
INJECTION, SOLUTION INFILTRATION; PERINEURAL AS NEEDED
Status: DISCONTINUED | OUTPATIENT
Start: 2023-04-18 | End: 2023-04-18 | Stop reason: SURG

## 2023-04-18 RX ORDER — LIDOCAINE HYDROCHLORIDE 10 MG/ML
0.5 INJECTION, SOLUTION INFILTRATION; PERINEURAL ONCE AS NEEDED
Status: DISCONTINUED | OUTPATIENT
Start: 2023-04-18 | End: 2023-04-18 | Stop reason: HOSPADM

## 2023-04-18 RX ADMIN — LIDOCAINE HYDROCHLORIDE 50 MG: 20 INJECTION, SOLUTION INFILTRATION; PERINEURAL at 12:15

## 2023-04-18 RX ADMIN — PROPOFOL 200 MCG/KG/MIN: 10 INJECTION, EMULSION INTRAVENOUS at 12:15

## 2023-04-18 RX ADMIN — SODIUM CHLORIDE, POTASSIUM CHLORIDE, SODIUM LACTATE AND CALCIUM CHLORIDE 30 ML/HR: 600; 310; 30; 20 INJECTION, SOLUTION INTRAVENOUS at 11:23

## 2023-04-18 RX ADMIN — PROPOFOL 150 MG: 10 INJECTION, EMULSION INTRAVENOUS at 12:15

## 2023-04-18 NOTE — NURSING NOTE
Dr. Norman's aware of heart rate dropping to 37 while remaining hemodynamically stable blood pressure.  Pt states chronic nausea and reason for EGD.  No distress. PWD with easy unlabored reps.  Pt denies dizziness with movement.

## 2023-04-18 NOTE — H&P
No chief complaint on file.      HPI  Patient today for an EGD due to nausea and vomiting epigastric pain as well as GERD.         Problem List:    Patient Active Problem List   Diagnosis   • Symptomatic states associated with artificial menopause   • Back pain, lumbosacral   • Hyperlipidemia   • Flushing   • Seizure   • Vaginal atrophy   • Diverticulitis   • Gastroesophageal reflux disease   • Hormone replacement therapy (HRT)   • Healthcare maintenance   • Nausea and vomiting   • Left upper quadrant abdominal pain   • Bloating       Medical History:    Past Medical History:   Diagnosis Date   • Abdominal pain, LLQ    • Abdominal pain, LUQ    • Back pain, lumbosacral    • Bradycardia    • Colon polyp 2015   • Colon polyps 02/09/2021    Ascending colon: tubular adenoma, sigmoid colon: tubular adenoma   • Diverticulitis 12/17/2020   • Diverticulitis of colon 09/2013   • Diverticulosis    • Epilepsy without status epilepticus, not intractable    • GERD (gastroesophageal reflux disease)    • Hyperlipidemia    • Kidney stones    • Multiple falls    • Seizures    • SOB (shortness of breath)    • Stroke 1999    unsure if Tia   • Symptomatic states associated with artificial menopause    • Visual impairment     WEAR GLASSES        Social History:    Social History     Socioeconomic History   • Marital status:    • Number of children: 1   • Years of education: BSN   Tobacco Use   • Smoking status: Never     Passive exposure: Never   • Smokeless tobacco: Never   • Tobacco comments:     caffeine use   Vaping Use   • Vaping Use: Never used   Substance and Sexual Activity   • Alcohol use: Never   • Drug use: Never   • Sexual activity: Yes     Partners: Male     Birth control/protection: Post-menopausal       Family History:   Family History   Problem Relation Age of Onset   • Sick sinus syndrome Mother    • Heart failure Mother    • Pernicious anemia Mother    • Hepatitis Mother         Hep C   • Other Mother          Peripheral neuropathy   • Colon polyps Mother    • Diverticulitis Mother    • Early death Mother         natural causes   • Heart disease Mother         cad   • Hyperlipidemia Mother    • Hypertension Mother    • Sick sinus syndrome Father         pacemaker   • Arthritis Father    • Cancer Father         prostate   • Heart disease Father         Parkinsons/prostate cancer/Aicd   • Hyperlipidemia Father    • Hypertension Father    • Parkinsonism Father    • Diabetes Maternal Grandmother    • Diabetes Paternal Grandmother    • No Known Problems Brother    • Stomach cancer Maternal Grandfather    • Colon cancer Neg Hx        Surgical History:   Past Surgical History:   Procedure Laterality Date   • APPENDECTOMY N/A    •  SECTION N/A    • COLONOSCOPY N/A 2021    Procedure: COLONOSCOPY w/ polypectomy;  Surgeon: Kilo Macedo MD;  Location: [facility] MAIN OR;  Service: Gastroenterology;  Laterality: N/A;   • COLONOSCOPY N/A 2015    Sigmoid diverticulosis, Repeat in 10 years-Dr. Erica Boyd, [facility]   • COLONOSCOPY W/ POLYPECTOMY N/A     1 polyp, benign    • ENDOSCOPY N/A 2021    Procedure: ESOPHAGOGASTRODUODENOSCOPY w/ biopsy;  Surgeon: Kilo Macedo MD;  Location: [facility] [place] MAIN OR;  Service: Gastroenterology;  Laterality: N/A;   • TOTAL LAPAROSCOPIC HYSTERECTOMY SALPINGO OOPHORECTOMY Bilateral    • UPPER GASTROINTESTINAL ENDOSCOPY         No current facility-administered medications for this encounter.    Allergies:   Allergies   Allergen Reactions   • Flagyl [Metronidazole] GI Intolerance        The following portions of the patient's history were reviewed by me and updated as appropriate: review of systems, allergies, current medications, past family history, past medical history, past social history, past surgical history and problem list.    There were no vitals filed for this visit.    PHYSICAL EXAM:    CONSTITUTIONAL:  today's vital signs reviewed by  me  GASTROINTESTINAL: abdomen is soft nontender nondistended with normal active bowel sounds, no masses are appreciated    Assessment/ Plan  We will proceed today with EGD.    Risks and benefits as well as alternatives to endoscopic evaluation were explained to the patient and they voiced understanding and wish to proceed.  These risks include but are not limited to the risk of bleeding, perforation, adverse reaction to sedation, and missed lesions.  The patient was given the opportunity to ask questions prior to the endoscopic procedure.

## 2023-04-18 NOTE — ANESTHESIA PREPROCEDURE EVALUATION
Anesthesia Evaluation     Patient summary reviewed   no history of anesthetic complications:  NPO Solid Status: > 8 hours  NPO Liquid Status: > 2 hours           Airway   Mallampati: I  Neck ROM: full  no difficulty expected  Dental - normal exam     Pulmonary     breath sounds clear to auscultation  (-) shortness of breath, recent URI, not a smoker    PE comment: nonlabored  Cardiovascular   Exercise tolerance: good (4-7 METS)    Rhythm: regular  Rate: normal    (+) hyperlipidemia,   (-) hypertension, valvular problems/murmurs, past MI, CAD, dysrhythmias, angina      Neuro/Psych  (+) seizures (partial, absence), CVA,    GI/Hepatic/Renal/Endo    (+) obesity,  GERD,  renal disease (h/o) stones,   (-) liver disease, diabetes, no thyroid disorder    ROS Comment: N/v; abd pain    Musculoskeletal     (+) back pain, chronic pain,   (-) neck stiffness  Abdominal    Substance History      OB/GYN          Other                          Anesthesia Plan    ASA 3     MAC       Anesthetic plan, risks, benefits, and alternatives have been provided, discussed and informed consent has been obtained with: patient.

## 2023-04-18 NOTE — ANESTHESIA POSTPROCEDURE EVALUATION
"Patient: Imani Mazariegos    Procedure Summary     Date: 04/18/23 Room / Location: [facility]    Anesthesia Start: 1211 Anesthesia Stop: 1229    Procedure: ESOPHAGOGASTRODUODENOSCOPY Diagnosis:       Nausea and vomiting, unspecified vomiting type      Left upper quadrant abdominal pain      Bloating      (Nausea and vomiting, unspecified vomiting type [R11.2])      (Left upper quadrant abdominal pain [R10.12])      (Bloating [R14.0])    Surgeons: Kilo Macedo MD Provider: Cameron Welsh DO    Anesthesia Type: MAC ASA Status: 3          Anesthesia Type: MAC    Vitals  Vitals Value Taken Time   /89 04/18/23 1239   Temp 37.1 °C (98.7 °F) 04/18/23 1226   Pulse 40 04/18/23 1239   Resp 16 04/18/23 1239   SpO2 99 % 04/18/23 1239           Post Anesthesia Care and Evaluation    Patient location during evaluation: bedside  Patient participation: complete - patient participated  Level of consciousness: awake and alert  Pain management: adequate    Airway patency: patent  Anesthetic complications: No anesthetic complications  PONV Status: controlled  Cardiovascular status: acceptable and hemodynamically stable  Respiratory status: acceptable, spontaneous ventilation and nonlabored ventilation  Hydration status: acceptable    Comments: /89   Pulse 52  Temp 37.1 °C (98.7 °F) (Tympanic)   Resp 16   Ht 167.6 cm (66\")   Wt 81.6 kg (180 lb)   SpO2 99%   BMI 29.05 kg/m²         "

## 2023-04-19 LAB
LAB AP CASE REPORT: NORMAL
PATH REPORT.FINAL DX SPEC: NORMAL
PATH REPORT.GROSS SPEC: NORMAL

## 2023-05-12 ENCOUNTER — OFFICE VISIT (OUTPATIENT)
Dept: CARDIOLOGY | Facility: CLINIC | Age: 58
End: 2023-05-12
Payer: COMMERCIAL

## 2023-05-12 VITALS
HEART RATE: 48 BPM | SYSTOLIC BLOOD PRESSURE: 144 MMHG | OXYGEN SATURATION: 96 % | WEIGHT: 185 LBS | HEIGHT: 66 IN | DIASTOLIC BLOOD PRESSURE: 84 MMHG | BODY MASS INDEX: 29.73 KG/M2

## 2023-05-12 DIAGNOSIS — R06.09 DYSPNEA ON EXERTION: ICD-10-CM

## 2023-05-12 DIAGNOSIS — R55 NEAR SYNCOPE: ICD-10-CM

## 2023-05-12 DIAGNOSIS — H53.8 BLURRED VISION, BILATERAL: ICD-10-CM

## 2023-05-12 DIAGNOSIS — E78.2 MIXED HYPERLIPIDEMIA: ICD-10-CM

## 2023-05-12 DIAGNOSIS — R07.9 CHEST PAIN IN ADULT: Primary | ICD-10-CM

## 2023-05-12 NOTE — PROGRESS NOTES
Cardiology Office Consultation      Encounter Date:  05/12/2023    Patient ID:   Imani Mazariegos is a 57 y.o. female.    Reason For Consultation:  Bradycardia  Anginal equivalent  Dyspnea on exertion    History of Present Illness:  Dear  Jim Amos APRN  It was my pleasure to see Imani Giorn in new consultation.  She is a 57-year-old female with no known history of coronary occlusive disease.  Past medical history includes GERD, urinary retention on Flomax.  She has significant family history of mother with sick sinus syndrome requiring permanent pacemaker implant and congestive heart failure.  Several immediate family members with coronary disease.  The patient has had prior negative stress testing in 2019.    The patient presents today in new consultation reporting several episodes of mild to moderate left scapular pain.  The most recent episode was associated with left-sided jaw pain and nausea.  There was no vomiting.  These occur with or without activity.  The patient is very active and plays [employer] 3 days/week, she has never smoked.  She has noticed progressively worsening shortness of breath with activity.  2 days ago while playing [employer] she had sudden blurred vision and a sense of near syncope.  She had to sit down and the symptoms eventually subsided.  She reports recent intentional weight loss.  She denies any fatigue, malaise, near syncopal or syncopal episodes.  No orthopnea or PND.  She denies any claudication, but does report recent mild edema to her lower extremities.    Ms. Mazariegos reports having EGD 2 weeks ago and was noted to have heart rate dropped into the upper 30s.  In review of prior EKGs dating back to 3/20/2019-heart rate ranges 40-51.  The patient states this is her normal.    Assessment & Plan    Impressions:  Anginal equivalent  Dyspnea on exertion  Lower extremity edema  Bradycardia  Strong family history of coronary disease and sick sinus syndrome    Recommendations:  I  "would recommend we proceed with complete ischemic work-up to include 2D echocardiogram and exercise nuclear stress testing.  The patient is agreeable to the plan.  She has longstanding history of bradycardia that is essentially asymptomatic        Diagnoses and all orders for this visit:    1. Dyspnea on exertion (Primary)  -     Stress Test With Myocardial Perfusion One Day; Future  -     Adult Transthoracic Echo Complete w/ Color, Spectral and Contrast if necessary per protocol; Future    2. Chest pain in adult  -     Stress Test With Myocardial Perfusion One Day; Future  -     Adult Transthoracic Echo Complete w/ Color, Spectral and Contrast if necessary per protocol; Future    3. Blurred vision, bilateral  -     Stress Test With Myocardial Perfusion One Day; Future  -     Adult Transthoracic Echo Complete w/ Color, Spectral and Contrast if necessary per protocol; Future    4. Near syncope  -     Stress Test With Myocardial Perfusion One Day; Future  -     Adult Transthoracic Echo Complete w/ Color, Spectral and Contrast if necessary per protocol; Future    5. Mixed hyperlipidemia  -     Stress Test With Myocardial Perfusion One Day; Future  -     Adult Transthoracic Echo Complete w/ Color, Spectral and Contrast if necessary per protocol; Future      Objective:    Vitals:  Vitals:    05/12/23 1143   BP: 144/84   Pulse: (!) 48   SpO2: 96%   Weight: 83.9 kg (185 lb)   Height: 167.6 cm (66\")       ROS    Review of Systems:  The following systems were reviewed as they relate to the cardiovascular system: Constitutional, Eyes, ENT, Cardiovascular, Respiratory, Gastrointestinal, Integumentary, Neurological, Psychiatric, Hematologic, Endocrine, Musculoskeletal, and Genitourinary. The pertinent cardiovascular findings are reported above with all other cardiovascular points within those systems being negative.    Physical Exam:     General: Alert, cooperative, no distress, appears stated age  Head:  Normocephalic, " atraumatic, mucous membranes moist  Eyes:  Conjunctiva/corneas clear, EOM's intact     Neck:  Supple,  no adenopathy;      Lungs: Clear to auscultation bilaterally, no wheezes rhonchi rales are noted  Chest wall: No tenderness  Heart::  Regular rate and rhythm, S1 and S2 normal, no murmur, rub or gallop  Musculoskeletal:   Ambulates freely without assistance  Abdomen: Soft, non-tender, nondistended bowel sounds active  Extremities: No cyanosis, clubbing, or edema  Pulses: 2+ and symmetric all extremities  Skin:  No rashes or lesions  Neuro/psych: A&O x3. CN II through XII are grossly intact with appropriate affect    History of Present Illness      ECG 12 Lead    Date/Time: 5/12/2023 12:22 PM  Performed by: Seda Adams APRN  Authorized by: Seda Adams APRN   Comparison: compared with previous ECG from 3/26/2019  Similar to previous ECG  Rhythm: sinus rhythm and sinus bradycardia  BPM: 48              Allergies:  Allergies   Allergen Reactions   • Flagyl [Metronidazole] GI Intolerance       Medication Review:     Current Outpatient Medications:   •  Biotin 5000 MCG capsule, Take  by mouth., Disp: , Rfl:   •  Calcium Citrate-Vitamin D3 (CITRACAL) 315-6.25 MG-MCG tablet tablet, Take  by mouth Daily., Disp: , Rfl:   •  estradiol (ESTRACE) 0.5 MG tablet, Take 1 tablet by mouth Daily., Disp: 90 tablet, Rfl: 1  •  famotidine (PEPCID) 40 MG tablet, TAKE 1 TABLET BY MOUTH DAILY, Disp: 30 tablet, Rfl: 5  •  pantoprazole (PROTONIX) 40 MG EC tablet, TAKE 1 TABLET BY MOUTH DAILY, Disp: 30 tablet, Rfl: 5  •  tamsulosin (FLOMAX) 0.4 MG capsule 24 hr capsule, Take 1 capsule by mouth Daily., Disp: , Rfl:   •  VITAMIN D PO, Take 1 tablet by mouth Daily., Disp: , Rfl:     Family History:  Family History   Problem Relation Age of Onset   • Sick sinus syndrome Mother    • Heart failure Mother    • Pernicious anemia Mother    • Hepatitis Mother         Hep C   • Other Mother         Peripheral neuropathy   • Colon  polyps Mother    • Diverticulitis Mother    • Early death Mother         natural causes   • Heart disease Mother         cad   • Hyperlipidemia Mother    • Hypertension Mother    • Sick sinus syndrome Father         pacemaker   • Arthritis Father    • Cancer Father         prostate   • Heart disease Father         Parkinsons/prostate cancer/Aicd   • Hyperlipidemia Father    • Hypertension Father    • Parkinsonism Father    • Diabetes Maternal Grandmother    • Diabetes Paternal Grandmother    • No Known Problems Brother    • Stomach cancer Maternal Grandfather    • Colon cancer Neg Hx        Past Medical History:  Past Medical History:   Diagnosis Date   • Abdominal pain, LLQ    • Abdominal pain, LUQ    • Back pain, lumbosacral    • Bradycardia    • Colon polyp 2015   • Colon polyps 2021    Ascending colon: tubular adenoma, sigmoid colon: tubular adenoma   • Diverticulitis 2020   • Diverticulitis of colon 2013   • Diverticulosis    • Epilepsy without status epilepticus, not intractable    • GERD (gastroesophageal reflux disease)    • Hyperlipidemia    • Kidney stones    • Multiple falls    • Seizures    • SOB (shortness of breath)    • Stroke     unsure if Tia   • Symptomatic states associated with artificial menopause    • Visual impairment     WEAR GLASSES       Past surgical History:  Past Surgical History:   Procedure Laterality Date   • APPENDECTOMY N/A    •  SECTION N/A    • COLONOSCOPY N/A 2021    Procedure: COLONOSCOPY w/ polypectomy;  Surgeon: Kilo Macedo MD;  Location: [facility] [place] MAIN OR;  Service: Gastroenterology;  Laterality: N/A;   • COLONOSCOPY N/A 2015    Sigmoid diverticulosis, Repeat in 10 years-Dr. Erica Boyd, [facility]   • COLONOSCOPY W/ POLYPECTOMY N/A     1 polyp, benign    • ENDOSCOPY N/A 2021    Procedure: ESOPHAGOGASTRODUODENOSCOPY w/ biopsy;  Surgeon: Kilo Macedo MD;  Location: [facility] [place] MAIN OR;  Service: Gastroenterology;   "Laterality: N/A;   • ENDOSCOPY N/A 4/18/2023    Procedure: ESOPHAGOGASTRODUODENOSCOPY;  Surgeon: Kilo Macedo MD;  [place] [facility] [place];  Service: Gastroenterology;  Laterality: N/A;   • TOTAL LAPAROSCOPIC HYSTERECTOMY SALPINGO OOPHORECTOMY Bilateral 2003   • UPPER GASTROINTESTINAL ENDOSCOPY  2021       Social History:  Social History     Socioeconomic History   • Marital status:    • Number of children: 1   • Years of education: BSN   Tobacco Use   • Smoking status: Never     Passive exposure: Never   • Smokeless tobacco: Never   • Tobacco comments:     caffeine use   Vaping Use   • Vaping Use: Never used   Substance and Sexual Activity   • Alcohol use: Never   • Drug use: Never   • Sexual activity: Yes     Partners: Male     Birth control/protection: Post-menopausal           NOTE: The following portions of the patient's history were reviewed and updated this visit as appropriate: allergies, current medications, past family history, past medical history, past social history, past surgical history and problem list.    EMR Dragon/Transcription:   \"Dictated utilizing Dragon dictation\".     "

## 2023-05-17 ENCOUNTER — OFFICE VISIT (OUTPATIENT)
Dept: GASTROENTEROLOGY | Facility: CLINIC | Age: 58
End: 2023-05-17
Payer: COMMERCIAL

## 2023-05-17 ENCOUNTER — LAB (OUTPATIENT)
Dept: LAB | Facility: HOSPITAL | Age: 58
End: 2023-05-17
Payer: COMMERCIAL

## 2023-05-17 VITALS
HEIGHT: 66 IN | WEIGHT: 183.3 LBS | SYSTOLIC BLOOD PRESSURE: 132 MMHG | TEMPERATURE: 97.8 F | DIASTOLIC BLOOD PRESSURE: 78 MMHG | HEART RATE: 84 BPM | BODY MASS INDEX: 29.46 KG/M2 | OXYGEN SATURATION: 99 %

## 2023-05-17 DIAGNOSIS — K29.70 GASTRITIS WITHOUT BLEEDING, UNSPECIFIED CHRONICITY, UNSPECIFIED GASTRITIS TYPE: ICD-10-CM

## 2023-05-17 DIAGNOSIS — R10.11 RIGHT UPPER QUADRANT ABDOMINAL PAIN: ICD-10-CM

## 2023-05-17 DIAGNOSIS — R10.13 EPIGASTRIC PAIN: Primary | ICD-10-CM

## 2023-05-17 DIAGNOSIS — M54.9 UPPER BACK PAIN ON LEFT SIDE: ICD-10-CM

## 2023-05-17 DIAGNOSIS — R11.0 NAUSEA: ICD-10-CM

## 2023-05-17 LAB
ALBUMIN SERPL-MCNC: 4.3 G/DL (ref 3.5–5.2)
ALBUMIN/GLOB SERPL: 1.6 G/DL
ALP SERPL-CCNC: 78 U/L (ref 39–117)
ALT SERPL W P-5'-P-CCNC: 24 U/L (ref 1–33)
AMYLASE SERPL-CCNC: 50 U/L (ref 28–100)
ANION GAP SERPL CALCULATED.3IONS-SCNC: 7.9 MMOL/L (ref 5–15)
AST SERPL-CCNC: 24 U/L (ref 1–32)
BASOPHILS # BLD AUTO: 0.02 10*3/MM3 (ref 0–0.2)
BASOPHILS NFR BLD AUTO: 0.4 % (ref 0–1.5)
BILIRUB SERPL-MCNC: 0.5 MG/DL (ref 0–1.2)
BUN SERPL-MCNC: 19 MG/DL (ref 6–20)
BUN/CREAT SERPL: 22.1 (ref 7–25)
CALCIUM SPEC-SCNC: 9 MG/DL (ref 8.6–10.5)
CHLORIDE SERPL-SCNC: 105 MMOL/L (ref 98–107)
CO2 SERPL-SCNC: 27.1 MMOL/L (ref 22–29)
CREAT SERPL-MCNC: 0.86 MG/DL (ref 0.57–1)
DEPRECATED RDW RBC AUTO: 41.8 FL (ref 37–54)
EGFRCR SERPLBLD CKD-EPI 2021: 77.9 ML/MIN/1.73
EOSINOPHIL # BLD AUTO: 0.06 10*3/MM3 (ref 0–0.4)
EOSINOPHIL NFR BLD AUTO: 1.1 % (ref 0.3–6.2)
ERYTHROCYTE [DISTWIDTH] IN BLOOD BY AUTOMATED COUNT: 12.6 % (ref 12.3–15.4)
GLOBULIN UR ELPH-MCNC: 2.7 GM/DL
GLUCOSE SERPL-MCNC: 95 MG/DL (ref 65–99)
HCT VFR BLD AUTO: 43.5 % (ref 34–46.6)
HGB BLD-MCNC: 15 G/DL (ref 12–15.9)
IMM GRANULOCYTES # BLD AUTO: 0.02 10*3/MM3 (ref 0–0.05)
IMM GRANULOCYTES NFR BLD AUTO: 0.4 % (ref 0–0.5)
LIPASE SERPL-CCNC: 26 U/L (ref 13–60)
LYMPHOCYTES # BLD AUTO: 1.84 10*3/MM3 (ref 0.7–3.1)
LYMPHOCYTES NFR BLD AUTO: 33.8 % (ref 19.6–45.3)
MCH RBC QN AUTO: 31.4 PG (ref 26.6–33)
MCHC RBC AUTO-ENTMCNC: 34.5 G/DL (ref 31.5–35.7)
MCV RBC AUTO: 91 FL (ref 79–97)
MONOCYTES # BLD AUTO: 0.5 10*3/MM3 (ref 0.1–0.9)
MONOCYTES NFR BLD AUTO: 9.2 % (ref 5–12)
NEUTROPHILS NFR BLD AUTO: 3.01 10*3/MM3 (ref 1.7–7)
NEUTROPHILS NFR BLD AUTO: 55.1 % (ref 42.7–76)
NRBC BLD AUTO-RTO: 0 /100 WBC (ref 0–0.2)
PLATELET # BLD AUTO: 209 10*3/MM3 (ref 140–450)
PMV BLD AUTO: 9.6 FL (ref 6–12)
POTASSIUM SERPL-SCNC: 4.4 MMOL/L (ref 3.5–5.2)
PROT SERPL-MCNC: 7 G/DL (ref 6–8.5)
RBC # BLD AUTO: 4.78 10*6/MM3 (ref 3.77–5.28)
SODIUM SERPL-SCNC: 140 MMOL/L (ref 136–145)
WBC NRBC COR # BLD: 5.45 10*3/MM3 (ref 3.4–10.8)

## 2023-05-17 PROCEDURE — 82150 ASSAY OF AMYLASE: CPT | Performed by: NURSE PRACTITIONER

## 2023-05-17 PROCEDURE — 83690 ASSAY OF LIPASE: CPT | Performed by: NURSE PRACTITIONER

## 2023-05-17 PROCEDURE — 80053 COMPREHEN METABOLIC PANEL: CPT | Performed by: NURSE PRACTITIONER

## 2023-05-17 PROCEDURE — 36415 COLL VENOUS BLD VENIPUNCTURE: CPT | Performed by: NURSE PRACTITIONER

## 2023-05-17 PROCEDURE — 99214 OFFICE O/P EST MOD 30 MIN: CPT | Performed by: NURSE PRACTITIONER

## 2023-05-17 PROCEDURE — 85025 COMPLETE CBC W/AUTO DIFF WBC: CPT | Performed by: NURSE PRACTITIONER

## 2023-05-17 RX ORDER — SUCRALFATE ORAL 1 G/10ML
1 SUSPENSION ORAL 3 TIMES DAILY
Qty: 1200 ML | Refills: 1 | Status: SHIPPED | OUTPATIENT
Start: 2023-05-17 | End: 2023-05-19

## 2023-05-17 RX ORDER — ONDANSETRON 4 MG/1
TABLET, ORALLY DISINTEGRATING ORAL
Qty: 20 TABLET | Refills: 1 | Status: SHIPPED | OUTPATIENT
Start: 2023-05-17

## 2023-05-17 NOTE — PROGRESS NOTES
"Chief Complaint   Patient presents with   • Abdominal Pain         History of Present Illness  Patient is a 57-year-old female who presents today for follow-up. She has a history of GERD, esophagitis, colon polyps, and diverticulitis.  She had an EGD April 2023 that was normal endoscopically, biopsies showed peptic duodenitis and mild inactive gastritis.    Patient presents today for follow-up.  She reports she has been experiencing ongoing and worsening GI symptoms since December 2022.  She reports burning pain to her upper abdomen.  She has had episodic right upper quadrant pain that has been severe associated with nausea.  She has had pain that radiates to the left upper back.  Symptoms come and go and are worsening.  Eating fried or greasy foods has made them worse.    She denies any significant bloating.  Denies any vomiting.    Bowels have been moving regularly, generally once per day.  Denies any blood in the stool.    She has had no further issues with diverticulitis.     Result Review :       Tissue Pathology Exam (04/18/2023 12:19)   UPPER GI ENDOSCOPY (04/18/2023 12:05)   CT Abdomen Pelvis With Contrast (09/11/2022 11:09)   Office Visit with Mandie Carlton APRN (10/05/2022)   COLONOSCOPY (02/08/2021 12:06)   UPPER GI ENDOSCOPY (02/08/2021 12:06)       Vital Signs:   /78   Pulse 84   Temp 97.8 °F (36.6 °C)   Ht 167.6 cm (66\")   Wt 83.1 kg (183 lb 4.8 oz)   SpO2 99%   BMI 29.59 kg/m²     Body mass index is 29.59 kg/m².     Physical Exam  Vitals reviewed.   Constitutional:       General: She is not in acute distress.     Appearance: She is well-developed.   HENT:      Head: Normocephalic and atraumatic.   Pulmonary:      Effort: Pulmonary effort is normal. No respiratory distress.   Abdominal:      General: Abdomen is flat. Bowel sounds are normal. There is no distension.      Palpations: Abdomen is soft.      Tenderness: There is no abdominal tenderness.   Skin:     General: Skin is dry.      " Coloration: Skin is not pale.   Neurological:      Mental Status: She is alert and oriented to person, place, and time.   Psychiatric:         Thought Content: Thought content normal.           Assessment and Plan    Diagnoses and all orders for this visit:    1. Epigastric pain (Primary)  -     NM HIDA SCAN WITH PHARMACOLOGICAL INTERVENTION; Future  -     MRI abdomen w wo contrast mrcp; Future  -     CBC & Differential  -     Comprehensive Metabolic Panel  -     Lipase  -     Amylase    2. Right upper quadrant abdominal pain  -     NM HIDA SCAN WITH PHARMACOLOGICAL INTERVENTION; Future  -     MRI abdomen w wo contrast mrcp; Future  -     CBC & Differential  -     Comprehensive Metabolic Panel  -     Lipase  -     Amylase    3. Upper back pain on left side  -     NM HIDA SCAN WITH PHARMACOLOGICAL INTERVENTION; Future  -     MRI abdomen w wo contrast mrcp; Future  -     CBC & Differential  -     Comprehensive Metabolic Panel  -     Lipase  -     Amylase    4. Nausea  -     NM HIDA SCAN WITH PHARMACOLOGICAL INTERVENTION; Future  -     MRI abdomen w wo contrast mrcp; Future  -     CBC & Differential  -     Comprehensive Metabolic Panel  -     Lipase  -     Amylase    5. Gastritis without bleeding, unspecified chronicity, unspecified gastritis type    Other orders  -     sucralfate (Carafate) 1 GM/10ML suspension; Take 10 mL by mouth 3 (Three) Times a Day.  Dispense: 1200 mL; Refill: 1  -     ondansetron ODT (ZOFRAN-ODT) 4 MG disintegrating tablet; Dissolve 1 tablet by mouth every 6 hours PRN nausea, vomiting  Dispense: 20 tablet; Refill: 1         Discussion  Patient presents today for follow-up with concerns about worsening abdominal pain and nausea.  Recommended HIDA scan to evaluate gallbladder function.  Due to radiation of pain we will obtain MRCP for further evaluation of hepatobiliary system and pancreas and to evaluate for any disorder these systems that could be contributing to symptoms.  If HIDA scan and MRCP  are negative and symptoms persist, to consider gastric emptying study.  While work-up is being completed, will add in Carafate to help with gastritis.          Follow Up   Return for Follow up to review results after testing complete.    Patient Instructions   Check lab work today.    Schedule HIDA scan for further evaluation of symptoms.    Schedule MRCP for further evaluation of symptoms.    For gastritis, continue pantoprazole and start Carafate as prescribed.  Prescription sent to pharmacy.

## 2023-05-17 NOTE — PATIENT INSTRUCTIONS
Check lab work today.    Schedule HIDA scan for further evaluation of symptoms.    Schedule MRCP for further evaluation of symptoms.    For gastritis, continue pantoprazole and start Carafate as prescribed.  Prescription sent to pharmacy.

## 2023-05-19 RX ORDER — SUCRALFATE ORAL 1 G/10ML
SUSPENSION ORAL
Qty: 2700 ML | Refills: 0 | Status: SHIPPED | OUTPATIENT
Start: 2023-05-19

## 2023-05-30 ENCOUNTER — HOSPITAL ENCOUNTER (OUTPATIENT)
Dept: NUCLEAR MEDICINE | Facility: HOSPITAL | Age: 58
Discharge: HOME | End: 2023-05-30

## 2023-05-30 DIAGNOSIS — R10.11 RIGHT UPPER QUADRANT ABDOMINAL PAIN: Primary | ICD-10-CM

## 2023-05-30 DIAGNOSIS — R10.11 RIGHT UPPER QUADRANT ABDOMINAL PAIN: ICD-10-CM

## 2023-05-30 DIAGNOSIS — M54.9 UPPER BACK PAIN ON LEFT SIDE: ICD-10-CM

## 2023-05-30 DIAGNOSIS — R11.0 NAUSEA: ICD-10-CM

## 2023-05-30 DIAGNOSIS — R94.8 ABNORMAL BILIARY HIDA SCAN: ICD-10-CM

## 2023-05-30 DIAGNOSIS — R10.13 EPIGASTRIC PAIN: ICD-10-CM

## 2023-05-30 PROCEDURE — 78227 HEPATOBIL SYST IMAGE W/DRUG: CPT

## 2023-05-30 PROCEDURE — A9537 TC99M MEBROFENIN: HCPCS | Performed by: NURSE PRACTITIONER

## 2023-05-30 PROCEDURE — 25010000002 SINCALIDE PER 5 MCG: Performed by: NURSE PRACTITIONER

## 2023-05-30 PROCEDURE — 0 TECHNETIUM TC 99M MEBROFENIN KIT: Performed by: NURSE PRACTITIONER

## 2023-05-30 RX ORDER — KIT FOR THE PREPARATION OF TECHNETIUM TC 99M MEBROFENIN 45 MG/10ML
1 INJECTION, POWDER, LYOPHILIZED, FOR SOLUTION INTRAVENOUS
Status: COMPLETED | OUTPATIENT
Start: 2023-05-30 | End: 2023-05-30

## 2023-05-30 RX ADMIN — SODIUM CHLORIDE 1.7 MCG: 9 INJECTION, SOLUTION INTRAVENOUS at 11:10

## 2023-05-30 RX ADMIN — MEBROFENIN 1 DOSE: 45 INJECTION, POWDER, LYOPHILIZED, FOR SOLUTION INTRAVENOUS at 10:10

## 2023-06-08 ENCOUNTER — TELEPHONE (OUTPATIENT)
Dept: GASTROENTEROLOGY | Facility: CLINIC | Age: 58
End: 2023-06-08
Payer: COMMERCIAL

## 2023-06-08 NOTE — TELEPHONE ENCOUNTER
I called patient regarding overdue MRI Abdomen MRCP that was going to be scheduled after results of HIDA Scan were available (it was abnormal: GB ejection 8%).   Patient would like to know if the MRI is still needed at this point.

## 2023-06-09 NOTE — TELEPHONE ENCOUNTER
We can hold off on the MRCP at this time since HIDA scan was abnormal.  She has upcoming surgical consultation and if they do not feel gallbladder is the source of her symptoms, we can proceed with it at that time if needed.

## 2023-07-14 PROBLEM — K82.8 BILIARY DYSKINESIA: Status: ACTIVE | Noted: 2023-07-14

## 2023-07-24 ENCOUNTER — HOSPITAL ENCOUNTER (OUTPATIENT)
Dept: ULTRASOUND IMAGING | Facility: HOSPITAL | Age: 58
Discharge: HOME | End: 2023-07-24
Admitting: SURGERY
Payer: COMMERCIAL

## 2023-07-24 DIAGNOSIS — K82.8 BILIARY DYSKINESIA: ICD-10-CM

## 2023-07-24 PROCEDURE — 76705 ECHO EXAM OF ABDOMEN: CPT

## 2023-07-31 ENCOUNTER — HOSPITAL ENCOUNTER (OUTPATIENT)
Dept: CARDIOLOGY | Facility: HOSPITAL | Age: 58
Discharge: HOME | End: 2023-07-31
Admitting: NURSE PRACTITIONER
Payer: COMMERCIAL

## 2023-07-31 ENCOUNTER — HOSPITAL ENCOUNTER (OUTPATIENT)
Dept: NUCLEAR MEDICINE | Facility: HOSPITAL | Age: 58
Discharge: HOME | End: 2023-07-31
Payer: COMMERCIAL

## 2023-07-31 VITALS
BODY MASS INDEX: 30.22 KG/M2 | HEIGHT: 66 IN | DIASTOLIC BLOOD PRESSURE: 63 MMHG | SYSTOLIC BLOOD PRESSURE: 109 MMHG | HEART RATE: 46 BPM | WEIGHT: 188 LBS

## 2023-07-31 DIAGNOSIS — R07.9 CHEST PAIN IN ADULT: ICD-10-CM

## 2023-07-31 DIAGNOSIS — H53.8 BLURRED VISION, BILATERAL: ICD-10-CM

## 2023-07-31 DIAGNOSIS — E78.2 MIXED HYPERLIPIDEMIA: ICD-10-CM

## 2023-07-31 DIAGNOSIS — R55 NEAR SYNCOPE: ICD-10-CM

## 2023-07-31 DIAGNOSIS — R06.09 DYSPNEA ON EXERTION: ICD-10-CM

## 2023-07-31 LAB
AORTIC ARCH: 3.3 CM
AORTIC DIMENSIONLESS INDEX: 0.7 (DI)
ASCENDING AORTA: 3.1 CM
BH CV ECHO MEAS - ACS: 1.83 CM
BH CV ECHO MEAS - AO MAX PG: 7.7 MMHG
BH CV ECHO MEAS - AO MEAN PG: 3.6 MMHG
BH CV ECHO MEAS - AO ROOT DIAM: 3.6 CM
BH CV ECHO MEAS - AO V2 MAX: 138.8 CM/SEC
BH CV ECHO MEAS - AO V2 VTI: 35.5 CM
BH CV ECHO MEAS - AVA(I,D): 2.36 CM2
BH CV ECHO MEAS - EDV(CUBED): 64.6 ML
BH CV ECHO MEAS - EDV(MOD-SP2): 72 ML
BH CV ECHO MEAS - EDV(MOD-SP4): 76 ML
BH CV ECHO MEAS - EF(MOD-BP): 63.9 %
BH CV ECHO MEAS - EF(MOD-SP2): 65.3 %
BH CV ECHO MEAS - EF(MOD-SP4): 60.5 %
BH CV ECHO MEAS - ESV(CUBED): 19.5 ML
BH CV ECHO MEAS - ESV(MOD-SP2): 25 ML
BH CV ECHO MEAS - ESV(MOD-SP4): 30 ML
BH CV ECHO MEAS - FS: 32.9 %
BH CV ECHO MEAS - IVS/LVPW: 1.11 CM
BH CV ECHO MEAS - IVSD: 1.04 CM
BH CV ECHO MEAS - LAT PEAK E' VEL: 8.7 CM/SEC
BH CV ECHO MEAS - LV MASS(C)D: 125.2 GRAMS
BH CV ECHO MEAS - LV MAX PG: 4.1 MMHG
BH CV ECHO MEAS - LV MEAN PG: 1.91 MMHG
BH CV ECHO MEAS - LV V1 MAX: 101.1 CM/SEC
BH CV ECHO MEAS - LV V1 VTI: 24.4 CM
BH CV ECHO MEAS - LVIDD: 4 CM
BH CV ECHO MEAS - LVIDS: 2.7 CM
BH CV ECHO MEAS - LVOT AREA: 3.4 CM2
BH CV ECHO MEAS - LVOT DIAM: 2.09 CM
BH CV ECHO MEAS - LVPWD: 0.94 CM
BH CV ECHO MEAS - MED PEAK E' VEL: 7.8 CM/SEC
BH CV ECHO MEAS - MV A DUR: 0.13 SEC
BH CV ECHO MEAS - MV A MAX VEL: 102.4 CM/SEC
BH CV ECHO MEAS - MV DEC SLOPE: 306.1 CM/SEC2
BH CV ECHO MEAS - MV DEC TIME: 0.19 MSEC
BH CV ECHO MEAS - MV E MAX VEL: 106 CM/SEC
BH CV ECHO MEAS - MV E/A: 1.04
BH CV ECHO MEAS - MV MAX PG: 5.5 MMHG
BH CV ECHO MEAS - MV MEAN PG: 1.4 MMHG
BH CV ECHO MEAS - MV P1/2T: 118.8 MSEC
BH CV ECHO MEAS - MV V2 VTI: 44.1 CM
BH CV ECHO MEAS - MVA(P1/2T): 1.85 CM2
BH CV ECHO MEAS - MVA(VTI): 1.9 CM2
BH CV ECHO MEAS - PULM A REVS DUR: 0.14 SEC
BH CV ECHO MEAS - PULM A REVS VEL: 38.1 CM/SEC
BH CV ECHO MEAS - PULM DIAS VEL: 48.4 CM/SEC
BH CV ECHO MEAS - PULM S/D: 1.26
BH CV ECHO MEAS - PULM SYS VEL: 60.8 CM/SEC
BH CV ECHO MEAS - RV MAX PG: 1.93 MMHG
BH CV ECHO MEAS - RV V1 MAX: 69.4 CM/SEC
BH CV ECHO MEAS - RV V1 VTI: 17.6 CM
BH CV ECHO MEAS - SV(LVOT): 83.8 ML
BH CV ECHO MEAS - SV(MOD-SP2): 47 ML
BH CV ECHO MEAS - SV(MOD-SP4): 46 ML
BH CV ECHO MEAS - TAPSE (>1.6): 2.6 CM
BH CV ECHO MEASUREMENTS AVERAGE E/E' RATIO: 12.85
BH CV REST NUCLEAR ISOTOPE DOSE: 10.8 MCI
BH CV STRESS BP STAGE 1: NORMAL
BH CV STRESS BP STAGE 2: NORMAL
BH CV STRESS BP STAGE 3: NORMAL
BH CV STRESS DURATION MIN STAGE 1: 3
BH CV STRESS DURATION MIN STAGE 2: 3
BH CV STRESS DURATION MIN STAGE 3: 1
BH CV STRESS DURATION SEC STAGE 1: 0
BH CV STRESS DURATION SEC STAGE 2: 0
BH CV STRESS DURATION SEC STAGE 3: 18
BH CV STRESS GRADE STAGE 1: 10
BH CV STRESS GRADE STAGE 2: 12
BH CV STRESS GRADE STAGE 3: 14
BH CV STRESS HR STAGE 1: 112
BH CV STRESS HR STAGE 2: 133
BH CV STRESS HR STAGE 3: 140
BH CV STRESS METS STAGE 1: 5
BH CV STRESS METS STAGE 2: 7.5
BH CV STRESS METS STAGE 3: 10
BH CV STRESS NUCLEAR ISOTOPE DOSE: 30.4 MCI
BH CV STRESS PROTOCOL 1: NORMAL
BH CV STRESS RECOVERY BP: NORMAL MMHG
BH CV STRESS RECOVERY HR: 68 BPM
BH CV STRESS SPEED STAGE 1: 1.7
BH CV STRESS SPEED STAGE 2: 2.5
BH CV STRESS SPEED STAGE 3: 3.4
BH CV STRESS STAGE 1: 1
BH CV STRESS STAGE 2: 2
BH CV STRESS STAGE 3: 3
BH CV XLRA - TDI S': 12.2 CM/SEC
LEFT ATRIUM VOLUME INDEX: 23.4 ML/M2
LV EF NUC BP: 86 %
MAXIMAL PREDICTED HEART RATE: 160 BPM
PERCENT MAX PREDICTED HR: 88.13 %
SINUS: 3.1 CM
STJ: 2.9 CM
STRESS BASELINE BP: NORMAL MMHG
STRESS BASELINE HR: 48 BPM
STRESS PERCENT HR: 104 %
STRESS POST ESTIMATED WORKLOAD: 9.1 METS
STRESS POST EXERCISE DUR MIN: 7 MIN
STRESS POST EXERCISE DUR SEC: 18 SEC
STRESS POST PEAK BP: NORMAL MMHG
STRESS POST PEAK HR: 141 BPM
STRESS TARGET HR: 136 BPM

## 2023-07-31 PROCEDURE — A9500 TC99M SESTAMIBI: HCPCS | Performed by: NURSE PRACTITIONER

## 2023-07-31 PROCEDURE — 93016 CV STRESS TEST SUPVJ ONLY: CPT | Performed by: INTERNAL MEDICINE

## 2023-07-31 PROCEDURE — 93017 CV STRESS TEST TRACING ONLY: CPT

## 2023-07-31 PROCEDURE — 93306 TTE W/DOPPLER COMPLETE: CPT

## 2023-07-31 PROCEDURE — 93306 TTE W/DOPPLER COMPLETE: CPT | Performed by: INTERNAL MEDICINE

## 2023-07-31 PROCEDURE — 0 TECHNETIUM SESTAMIBI: Performed by: NURSE PRACTITIONER

## 2023-07-31 PROCEDURE — 78452 HT MUSCLE IMAGE SPECT MULT: CPT

## 2023-07-31 PROCEDURE — 78452 HT MUSCLE IMAGE SPECT MULT: CPT | Performed by: INTERNAL MEDICINE

## 2023-07-31 PROCEDURE — 93018 CV STRESS TEST I&R ONLY: CPT | Performed by: INTERNAL MEDICINE

## 2023-07-31 RX ADMIN — TECHNETIUM TC 99M SESTAMIBI 1 DOSE: 1 INJECTION INTRAVENOUS at 07:47

## 2023-07-31 RX ADMIN — TECHNETIUM TC 99M SESTAMIBI 1 DOSE: 1 INJECTION INTRAVENOUS at 11:52

## 2023-08-04 ENCOUNTER — TELEPHONE (OUTPATIENT)
Dept: CARDIOLOGY | Facility: CLINIC | Age: 58
End: 2023-08-04

## 2023-08-04 NOTE — TELEPHONE ENCOUNTER
Caller: Imani Mazariegos    Relationship to patient: Self    Best call back number: [phone]     Chief complaint: BRADYCARDIA    Type of visit: FU/CLEARANCE    Requested date: ASAP    If rescheduling, when is the original appointment: NA    Additional notes: PT STATES SHE IS NEEDING HER GALLBLADDER REMOVED AND WAS SENT BY [facility] TO HAVE CARDIAC WORKUP WITH KRAIG DAVISON  WHO ORDERED ECHO TESTING - PT WAS TOLD BY KRAIG TO FOLLOW UP WITH DR URIBE AFTER TESTS FOR CLEARANCE - NO TIMEFRAME OR AVAILABILITY SHOWING - EVAN DAVISON SHOWING AVAIL IN SEPT

## 2023-08-04 NOTE — TELEPHONE ENCOUNTER
Coty signed her clearance form- faxed to Dr Boyd. Message has been left for pt letting her know it has been done.

## 2023-08-08 ENCOUNTER — PREP FOR SURGERY (OUTPATIENT)
Dept: OTHER | Facility: HOSPITAL | Age: 58
End: 2023-08-08
Payer: COMMERCIAL

## 2023-08-08 ENCOUNTER — TELEPHONE (OUTPATIENT)
Dept: SURGERY | Facility: CLINIC | Age: 58
End: 2023-08-08
Payer: COMMERCIAL

## 2023-08-08 DIAGNOSIS — K82.8 BILIARY DYSKINESIA: Primary | ICD-10-CM

## 2023-08-08 RX ORDER — SODIUM CHLORIDE 0.9 % (FLUSH) 0.9 %
10 SYRINGE (ML) INJECTION AS NEEDED
OUTPATIENT
Start: 2023-08-18

## 2023-08-08 RX ORDER — ACETAMINOPHEN 500 MG
1000 TABLET ORAL ONCE
OUTPATIENT
Start: 2023-08-18 | End: 2023-08-08

## 2023-08-08 RX ORDER — CEFAZOLIN SODIUM 2 G/100ML
2000 INJECTION, SOLUTION INTRAVENOUS ONCE
OUTPATIENT
Start: 2023-08-18 | End: 2023-08-08

## 2023-08-08 RX ORDER — SODIUM CHLORIDE 9 MG/ML
40 INJECTION, SOLUTION INTRAVENOUS AS NEEDED
OUTPATIENT
Start: 2023-08-18

## 2023-08-08 RX ORDER — ONDANSETRON 2 MG/ML
4 INJECTION INTRAMUSCULAR; INTRAVENOUS EVERY 6 HOURS PRN
OUTPATIENT
Start: 2023-08-08

## 2023-08-08 RX ORDER — OXYCODONE HCL 10 MG/1
10 TABLET, FILM COATED, EXTENDED RELEASE ORAL ONCE
OUTPATIENT
Start: 2023-08-18 | End: 2023-08-08

## 2023-08-08 RX ORDER — SODIUM CHLORIDE 0.9 % (FLUSH) 0.9 %
10 SYRINGE (ML) INJECTION EVERY 12 HOURS SCHEDULED
OUTPATIENT
Start: 2023-08-18

## 2023-08-15 ENCOUNTER — PRE-ADMISSION TESTING (OUTPATIENT)
Dept: PREADMISSION TESTING | Facility: HOSPITAL | Age: 58
End: 2023-08-15
Payer: COMMERCIAL

## 2023-08-15 VITALS
BODY MASS INDEX: 30.34 KG/M2 | HEART RATE: 59 BPM | WEIGHT: 188.8 LBS | RESPIRATION RATE: 18 BRPM | TEMPERATURE: 97 F | SYSTOLIC BLOOD PRESSURE: 114 MMHG | DIASTOLIC BLOOD PRESSURE: 70 MMHG | OXYGEN SATURATION: 99 % | HEIGHT: 66 IN

## 2023-08-15 LAB
ANION GAP SERPL CALCULATED.3IONS-SCNC: 8 MMOL/L (ref 5–15)
BUN SERPL-MCNC: 17 MG/DL (ref 8–23)
BUN/CREAT SERPL: 20 (ref 7–25)
CALCIUM SPEC-SCNC: 9.3 MG/DL (ref 8.6–10.5)
CHLORIDE SERPL-SCNC: 105 MMOL/L (ref 98–107)
CO2 SERPL-SCNC: 28 MMOL/L (ref 22–29)
CREAT SERPL-MCNC: 0.85 MG/DL (ref 0.57–1)
DEPRECATED RDW RBC AUTO: 41.7 FL (ref 37–54)
EGFRCR SERPLBLD CKD-EPI 2021: 78.5 ML/MIN/1.73
ERYTHROCYTE [DISTWIDTH] IN BLOOD BY AUTOMATED COUNT: 12.4 % (ref 12.3–15.4)
GLUCOSE SERPL-MCNC: 85 MG/DL (ref 65–99)
HCT VFR BLD AUTO: 43.5 % (ref 34–46.6)
HGB BLD-MCNC: 14.8 G/DL (ref 12–15.9)
MCH RBC QN AUTO: 31.4 PG (ref 26.6–33)
MCHC RBC AUTO-ENTMCNC: 34 G/DL (ref 31.5–35.7)
MCV RBC AUTO: 92.2 FL (ref 79–97)
PLATELET # BLD AUTO: 240 10*3/MM3 (ref 140–450)
PMV BLD AUTO: 9.9 FL (ref 6–12)
POTASSIUM SERPL-SCNC: 4.4 MMOL/L (ref 3.5–5.2)
RBC # BLD AUTO: 4.72 10*6/MM3 (ref 3.77–5.28)
SODIUM SERPL-SCNC: 141 MMOL/L (ref 136–145)
WBC NRBC COR # BLD: 5.47 10*3/MM3 (ref 3.4–10.8)

## 2023-08-15 PROCEDURE — 80048 BASIC METABOLIC PNL TOTAL CA: CPT

## 2023-08-15 PROCEDURE — 85027 COMPLETE CBC AUTOMATED: CPT

## 2023-08-15 PROCEDURE — 36415 COLL VENOUS BLD VENIPUNCTURE: CPT

## 2023-08-15 RX ORDER — CHLORHEXIDINE GLUCONATE 500 MG/1
CLOTH TOPICAL
COMMUNITY
End: 2023-08-18 | Stop reason: HOSPADM

## 2023-08-15 RX ORDER — SACCHAROMYCES BOULARDII 250 MG
250 CAPSULE ORAL 2 TIMES DAILY
COMMUNITY

## 2023-08-15 RX ORDER — CHLORAL HYDRATE 500 MG
CAPSULE ORAL
COMMUNITY

## 2023-08-15 RX ORDER — FAMOTIDINE 20 MG/1
20 TABLET, FILM COATED ORAL DAILY
COMMUNITY

## 2023-08-15 NOTE — DISCHARGE INSTRUCTIONS
Take the following medications the morning of surgery:  FAMOTIDINE, PANTOPRAZOLE    ARRIVE FOR SURGERY AT 8:30 AM      If you are on prescription narcotic pain medication to control your pain you may also take that medication the morning of surgery.    General Instructions:  Do not eat solid food after midnight the night before surgery.  You may drink clear liquids day of surgery but must stop at least one hour before your hospital arrival time.  It is beneficial for you to have a clear drink that contains carbohydrates the day of surgery.  We suggest a 12 to 20 ounce bottle of Gatorade or Powerade for non-diabetic patients or a 12 to 20 ounce bottle of G2 or Powerade Zero for diabetic patients. (Pediatric patients, are not advised to drink a 12 to 20 ounce carbohydrate drink)    Clear liquids are liquids you can see through.  Nothing red in color.     Plain water                               Sports drinks  Sodas                                   Gelatin (Jell-O)  Fruit juices without pulp such as white grape juice and apple juice  Popsicles that contain no fruit or yogurt  Tea or coffee (no cream or milk added)  Gatorade / Powerade  G2 / Powerade Zero    Infants may have breast milk up to four hours before surgery.  Infants drinking formula may drink formula up to six hours before surgery.   Patients who avoid smoking, chewing tobacco and alcohol for 4 weeks prior to surgery have a reduced risk of post-operative complications.  Quit smoking as many days before surgery as you can.  Do not smoke, use chewing tobacco or drink alcohol the day of surgery.   If applicable bring your C-PAP/ BI-PAP machine in with you to preop day of surgery.  Bring any papers given to you in the doctor's office.  Wear clean comfortable clothes.  Do not wear contact lenses, false eyelashes or make-up.  Bring a case for your glasses.   Bring crutches or walker if applicable.  Remove all piercings.  Leave jewelry and any other valuables at  home.  Hair extensions with metal clips must be removed prior to surgery.  The Pre-Admission Testing nurse will instruct you to bring medications if unable to obtain an accurate list in Pre-Admission Testing.        If you were given a blood bank ID arm band remember to bring it with you the day of surgery.    Preventing a Surgical Site Infection:  For 2 to 3 days before surgery, avoid shaving with a razor because the razor can irritate skin and make it easier to develop an infection.    Any areas of open skin can increase the risk of a post-operative wound infection by allowing bacteria to enter and travel throughout the body.  Notify your surgeon if you have any skin wounds / rashes even if it is not near the expected surgical site.  The area will need assessed to determine if surgery should be delayed until it is healed.  The night prior to surgery shower using a fresh bar of anti-bacterial soap (such as Dial) and clean washcloth.  Sleep in a clean bed with clean clothing.  Do not allow pets to sleep with you.  Shower on the morning of surgery using a fresh bar of anti-bacterial soap (such as Dial) and clean washcloth.  Dry with a clean towel and dress in clean clothing.  Ask your surgeon if you will be receiving antibiotics prior to surgery.  Make sure you, your family, and all healthcare providers clean their hands with soap and water or an alcohol based hand  before caring for you or your wound.    CHLORHEXIDINE CLOTH INSTRUCTIONS  The morning of surgery follow these instructions using the Chlorhexidine cloths you've been given.  These steps reduce bacteria on the body.  Do not use the cloths near your eyes, ears mouth, genitalia or on open wounds.  Throw the cloths away after use but do not try to flush them down a toilet.      Open and remove one cloth at a time from the package.    Leave the cloth unfolded and begin the bathing.  Massage the skin with the cloths using gentle pressure to remove  bacteria.  Do not scrub harshly.   Follow the steps below with one 2% CHG cloth per area (6 total cloths).  One cloth for neck, shoulders and chest.  One cloth for both arms, hands, fingers and underarms (do underarms last).  One cloth for the abdomen followed by groin.  One cloth for right leg and foot including between the toes.  One cloth for left leg and foot including between the toes.  The last cloth is to be used for the back of the neck, back and buttocks.    Allow the CHG to air dry 3 minutes on the skin which will give it time to work and decrease the chance of irritation.  The skin may feel sticky until it is dry.  Do not rinse with water or any other liquid or you will lose the beneficial effects of the CHG.  If mild skin irritation occurs, do rinse the skin to remove the CHG.  Report this to the nurse at time of admission.  Do not apply lotions, creams, ointments, deodorants or perfumes after using the clothes. Dress in clean clothes before coming to the hospital.    Day of surgery:  Your arrival time is approximately two hours before your scheduled surgery time.  Upon arrival, a Pre-op nurse and Anesthesiologist will review your health history, obtain vital signs, and answer questions you may have.  The only belongings needed at this time will be a list of your home medications and if applicable your C-PAP/BI-PAP machine.  A Pre-op nurse will start an IV and you may receive medication in preparation for surgery, including something to help you relax.     Please be aware that surgery does come with discomfort.  We want to make every effort to control your discomfort so please discuss any uncontrolled symptoms with your nurse.   Your doctor will most likely have prescribed pain medications.      If you are going home after surgery you will receive individualized written care instructions before being discharged.  A responsible adult must drive you to and from the hospital on the day of your surgery and  stay with you for 24 hours.  Discharge prescriptions can be filled by the hospital pharmacy during regular pharmacy hours.  If you are having surgery late in the day/evening your prescription may be e-prescribed to your pharmacy.  Please verify your pharmacy hours or chose a 24 hour pharmacy to avoid not having access to your prescription because your pharmacy has closed for the day.    If you are staying overnight following surgery, you will be transported to your hospital room following the recovery period.  [facility] has all private rooms.    If you have any questions please call Pre-Admission Testing at [phone].  Deductibles and co-payments are collected on the day of service. Please be prepared to pay the required co-pay, deductible or deposit on the day of service as defined by your plan.    Call your surgeon immediately if you experience any of the following symptoms:  Sore Throat  Shortness of Breath or difficulty breathing  Cough  Chills  Body soreness or muscle pain  Headache  Fever  New loss of taste or smell  Do not arrive for your surgery ill.  Your procedure will need to be rescheduled to another time.  You will need to call your physician before the day of surgery to avoid any unnecessary exposure to hospital staff as well as other patients.

## 2023-08-18 ENCOUNTER — APPOINTMENT (OUTPATIENT)
Dept: GENERAL RADIOLOGY | Facility: HOSPITAL | Age: 58
End: 2023-08-18
Payer: COMMERCIAL

## 2023-08-18 ENCOUNTER — ANESTHESIA (OUTPATIENT)
Dept: PERIOP | Facility: HOSPITAL | Age: 58
End: 2023-08-18
Payer: COMMERCIAL

## 2023-08-18 ENCOUNTER — HOSPITAL ENCOUNTER (OUTPATIENT)
Facility: HOSPITAL | Age: 58
Setting detail: HOSPITAL OUTPATIENT SURGERY
Discharge: HOME | End: 2023-08-18
Attending: SURGERY | Admitting: SURGERY
Payer: COMMERCIAL

## 2023-08-18 ENCOUNTER — ANESTHESIA EVENT (OUTPATIENT)
Dept: PERIOP | Facility: HOSPITAL | Age: 58
End: 2023-08-18
Payer: COMMERCIAL

## 2023-08-18 VITALS
TEMPERATURE: 97.5 F | BODY MASS INDEX: 30.47 KG/M2 | RESPIRATION RATE: 16 BRPM | OXYGEN SATURATION: 100 % | SYSTOLIC BLOOD PRESSURE: 117 MMHG | DIASTOLIC BLOOD PRESSURE: 87 MMHG | HEART RATE: 53 BPM | HEIGHT: 66 IN

## 2023-08-18 DIAGNOSIS — K82.8 BILIARY DYSKINESIA: ICD-10-CM

## 2023-08-18 PROCEDURE — 25010000002 DEXAMETHASONE PER 1 MG: Performed by: NURSE ANESTHETIST, CERTIFIED REGISTERED

## 2023-08-18 PROCEDURE — 74300 X-RAY BILE DUCTS/PANCREAS: CPT

## 2023-08-18 PROCEDURE — 25010000002 KETOROLAC TROMETHAMINE PER 15 MG: Performed by: NURSE ANESTHETIST, CERTIFIED REGISTERED

## 2023-08-18 PROCEDURE — 47563 LAPARO CHOLECYSTECTOMY/GRAPH: CPT | Performed by: PHYSICIAN ASSISTANT

## 2023-08-18 PROCEDURE — 88304 TISSUE EXAM BY PATHOLOGIST: CPT | Performed by: SURGERY

## 2023-08-18 PROCEDURE — 25010000002 CEFAZOLIN IN DEXTROSE 2-4 GM/100ML-% SOLUTION: Performed by: SURGERY

## 2023-08-18 PROCEDURE — 25010000002 PROPOFOL 10 MG/ML EMULSION: Performed by: NURSE ANESTHETIST, CERTIFIED REGISTERED

## 2023-08-18 PROCEDURE — 25010000002 SUGAMMADEX 200 MG/2ML SOLUTION: Performed by: NURSE ANESTHETIST, CERTIFIED REGISTERED

## 2023-08-18 PROCEDURE — 47563 LAPARO CHOLECYSTECTOMY/GRAPH: CPT | Performed by: SURGERY

## 2023-08-18 PROCEDURE — 25010000002 ONDANSETRON PER 1 MG: Performed by: NURSE ANESTHETIST, CERTIFIED REGISTERED

## 2023-08-18 PROCEDURE — 25010000002 FENTANYL CITRATE (PF) 50 MCG/ML SOLUTION: Performed by: NURSE ANESTHETIST, CERTIFIED REGISTERED

## 2023-08-18 PROCEDURE — 25010000002 PHENYLEPHRINE 10 MG/ML SOLUTION: Performed by: NURSE ANESTHETIST, CERTIFIED REGISTERED

## 2023-08-18 PROCEDURE — 25010000002 HYDROMORPHONE PER 4 MG: Performed by: NURSE ANESTHETIST, CERTIFIED REGISTERED

## 2023-08-18 PROCEDURE — 0 IOTHALAMATE 60 % SOLUTION: Performed by: SURGERY

## 2023-08-18 PROCEDURE — 25010000002 HYDROMORPHONE 1 MG/ML SOLUTION: Performed by: NURSE ANESTHETIST, CERTIFIED REGISTERED

## 2023-08-18 DEVICE — HORIZON TI ML 6 CLIPS/CART
Type: IMPLANTABLE DEVICE | Site: ABDOMEN | Status: FUNCTIONAL
Brand: WECK

## 2023-08-18 RX ORDER — PROMETHAZINE HYDROCHLORIDE 25 MG/1
25 TABLET ORAL ONCE AS NEEDED
Status: DISCONTINUED | OUTPATIENT
Start: 2023-08-18 | End: 2023-08-18 | Stop reason: HOSPADM

## 2023-08-18 RX ORDER — SODIUM CHLORIDE 9 MG/ML
40 INJECTION, SOLUTION INTRAVENOUS AS NEEDED
Status: DISCONTINUED | OUTPATIENT
Start: 2023-08-18 | End: 2023-08-18 | Stop reason: HOSPADM

## 2023-08-18 RX ORDER — MAGNESIUM HYDROXIDE 1200 MG/15ML
LIQUID ORAL AS NEEDED
Status: DISCONTINUED | OUTPATIENT
Start: 2023-08-18 | End: 2023-08-18 | Stop reason: HOSPADM

## 2023-08-18 RX ORDER — LIDOCAINE HYDROCHLORIDE 20 MG/ML
INJECTION, SOLUTION INFILTRATION; PERINEURAL AS NEEDED
Status: DISCONTINUED | OUTPATIENT
Start: 2023-08-18 | End: 2023-08-18 | Stop reason: SURG

## 2023-08-18 RX ORDER — SODIUM CHLORIDE 9 MG/ML
INJECTION, SOLUTION INTRAVENOUS AS NEEDED
Status: DISCONTINUED | OUTPATIENT
Start: 2023-08-18 | End: 2023-08-18 | Stop reason: HOSPADM

## 2023-08-18 RX ORDER — PROMETHAZINE HYDROCHLORIDE 25 MG/1
25 SUPPOSITORY RECTAL ONCE AS NEEDED
Status: DISCONTINUED | OUTPATIENT
Start: 2023-08-18 | End: 2023-08-18 | Stop reason: HOSPADM

## 2023-08-18 RX ORDER — FENTANYL CITRATE 50 UG/ML
50 INJECTION, SOLUTION INTRAMUSCULAR; INTRAVENOUS
Status: DISCONTINUED | OUTPATIENT
Start: 2023-08-18 | End: 2023-08-18 | Stop reason: HOSPADM

## 2023-08-18 RX ORDER — KETOROLAC TROMETHAMINE 30 MG/ML
INJECTION, SOLUTION INTRAMUSCULAR; INTRAVENOUS AS NEEDED
Status: DISCONTINUED | OUTPATIENT
Start: 2023-08-18 | End: 2023-08-18 | Stop reason: SURG

## 2023-08-18 RX ORDER — PROPOFOL 10 MG/ML
VIAL (ML) INTRAVENOUS AS NEEDED
Status: DISCONTINUED | OUTPATIENT
Start: 2023-08-18 | End: 2023-08-18 | Stop reason: SURG

## 2023-08-18 RX ORDER — TRAMADOL HYDROCHLORIDE 50 MG/1
50 TABLET ORAL EVERY 4 HOURS PRN
Qty: 7 TABLET | Refills: 0 | Status: SHIPPED | OUTPATIENT
Start: 2023-08-18 | End: 2023-08-25

## 2023-08-18 RX ORDER — ACETAMINOPHEN 500 MG
1000 TABLET ORAL ONCE
Status: COMPLETED | OUTPATIENT
Start: 2023-08-18 | End: 2023-08-18

## 2023-08-18 RX ORDER — ROCURONIUM BROMIDE 10 MG/ML
INJECTION, SOLUTION INTRAVENOUS AS NEEDED
Status: DISCONTINUED | OUTPATIENT
Start: 2023-08-18 | End: 2023-08-18 | Stop reason: SURG

## 2023-08-18 RX ORDER — ONDANSETRON 2 MG/ML
4 INJECTION INTRAMUSCULAR; INTRAVENOUS ONCE AS NEEDED
Status: COMPLETED | OUTPATIENT
Start: 2023-08-18 | End: 2023-08-18

## 2023-08-18 RX ORDER — FLUMAZENIL 0.1 MG/ML
0.2 INJECTION INTRAVENOUS AS NEEDED
Status: DISCONTINUED | OUTPATIENT
Start: 2023-08-18 | End: 2023-08-18 | Stop reason: HOSPADM

## 2023-08-18 RX ORDER — OXYCODONE HCL 10 MG/1
10 TABLET, FILM COATED, EXTENDED RELEASE ORAL ONCE
Status: COMPLETED | OUTPATIENT
Start: 2023-08-18 | End: 2023-08-18

## 2023-08-18 RX ORDER — CEFAZOLIN SODIUM 2 G/100ML
2000 INJECTION, SOLUTION INTRAVENOUS ONCE
Status: COMPLETED | OUTPATIENT
Start: 2023-08-18 | End: 2023-08-18

## 2023-08-18 RX ORDER — GLYCOPYRROLATE 0.2 MG/ML
INJECTION INTRAMUSCULAR; INTRAVENOUS AS NEEDED
Status: DISCONTINUED | OUTPATIENT
Start: 2023-08-18 | End: 2023-08-18 | Stop reason: SURG

## 2023-08-18 RX ORDER — PHENYLEPHRINE HYDROCHLORIDE 10 MG/ML
INJECTION INTRAVENOUS AS NEEDED
Status: DISCONTINUED | OUTPATIENT
Start: 2023-08-18 | End: 2023-08-18 | Stop reason: SURG

## 2023-08-18 RX ORDER — DEXAMETHASONE SODIUM PHOSPHATE 4 MG/ML
INJECTION, SOLUTION INTRA-ARTICULAR; INTRALESIONAL; INTRAMUSCULAR; INTRAVENOUS; SOFT TISSUE AS NEEDED
Status: DISCONTINUED | OUTPATIENT
Start: 2023-08-18 | End: 2023-08-18 | Stop reason: SURG

## 2023-08-18 RX ORDER — HYDRALAZINE HYDROCHLORIDE 20 MG/ML
5 INJECTION INTRAMUSCULAR; INTRAVENOUS
Status: DISCONTINUED | OUTPATIENT
Start: 2023-08-18 | End: 2023-08-18 | Stop reason: HOSPADM

## 2023-08-18 RX ORDER — IPRATROPIUM BROMIDE AND ALBUTEROL SULFATE 2.5; .5 MG/3ML; MG/3ML
3 SOLUTION RESPIRATORY (INHALATION) ONCE AS NEEDED
Status: DISCONTINUED | OUTPATIENT
Start: 2023-08-18 | End: 2023-08-18 | Stop reason: HOSPADM

## 2023-08-18 RX ORDER — HYDROCODONE BITARTRATE AND ACETAMINOPHEN 7.5; 325 MG/1; MG/1
1 TABLET ORAL ONCE AS NEEDED
Status: COMPLETED | OUTPATIENT
Start: 2023-08-18 | End: 2023-08-18

## 2023-08-18 RX ORDER — FENTANYL CITRATE 50 UG/ML
INJECTION, SOLUTION INTRAMUSCULAR; INTRAVENOUS AS NEEDED
Status: DISCONTINUED | OUTPATIENT
Start: 2023-08-18 | End: 2023-08-18 | Stop reason: SURG

## 2023-08-18 RX ORDER — SODIUM CHLORIDE 0.9 % (FLUSH) 0.9 %
10 SYRINGE (ML) INJECTION AS NEEDED
Status: DISCONTINUED | OUTPATIENT
Start: 2023-08-18 | End: 2023-08-18 | Stop reason: HOSPADM

## 2023-08-18 RX ORDER — DROPERIDOL 2.5 MG/ML
0.62 INJECTION, SOLUTION INTRAMUSCULAR; INTRAVENOUS
Status: DISCONTINUED | OUTPATIENT
Start: 2023-08-18 | End: 2023-08-18 | Stop reason: HOSPADM

## 2023-08-18 RX ORDER — MIDAZOLAM HYDROCHLORIDE 1 MG/ML
1 INJECTION INTRAMUSCULAR; INTRAVENOUS
Status: DISCONTINUED | OUTPATIENT
Start: 2023-08-18 | End: 2023-08-18 | Stop reason: HOSPADM

## 2023-08-18 RX ORDER — LABETALOL HYDROCHLORIDE 5 MG/ML
5 INJECTION, SOLUTION INTRAVENOUS
Status: DISCONTINUED | OUTPATIENT
Start: 2023-08-18 | End: 2023-08-18 | Stop reason: HOSPADM

## 2023-08-18 RX ORDER — FAMOTIDINE 10 MG/ML
20 INJECTION, SOLUTION INTRAVENOUS
Status: DISCONTINUED | OUTPATIENT
Start: 2023-08-18 | End: 2023-08-18 | Stop reason: HOSPADM

## 2023-08-18 RX ORDER — SODIUM CHLORIDE, SODIUM LACTATE, POTASSIUM CHLORIDE, CALCIUM CHLORIDE 600; 310; 30; 20 MG/100ML; MG/100ML; MG/100ML; MG/100ML
9 INJECTION, SOLUTION INTRAVENOUS CONTINUOUS PRN
Status: DISCONTINUED | OUTPATIENT
Start: 2023-08-18 | End: 2023-08-18 | Stop reason: HOSPADM

## 2023-08-18 RX ORDER — HYDROMORPHONE HYDROCHLORIDE 1 MG/ML
0.5 INJECTION, SOLUTION INTRAMUSCULAR; INTRAVENOUS; SUBCUTANEOUS
Status: DISCONTINUED | OUTPATIENT
Start: 2023-08-18 | End: 2023-08-18 | Stop reason: HOSPADM

## 2023-08-18 RX ORDER — BUPIVACAINE HYDROCHLORIDE AND EPINEPHRINE 5; 5 MG/ML; UG/ML
INJECTION, SOLUTION EPIDURAL; INTRACAUDAL; PERINEURAL AS NEEDED
Status: DISCONTINUED | OUTPATIENT
Start: 2023-08-18 | End: 2023-08-18 | Stop reason: HOSPADM

## 2023-08-18 RX ORDER — DIPHENHYDRAMINE HYDROCHLORIDE 50 MG/ML
12.5 INJECTION INTRAMUSCULAR; INTRAVENOUS
Status: DISCONTINUED | OUTPATIENT
Start: 2023-08-18 | End: 2023-08-18 | Stop reason: HOSPADM

## 2023-08-18 RX ORDER — EPHEDRINE SULFATE 50 MG/ML
5 INJECTION, SOLUTION INTRAVENOUS ONCE AS NEEDED
Status: DISCONTINUED | OUTPATIENT
Start: 2023-08-18 | End: 2023-08-18 | Stop reason: HOSPADM

## 2023-08-18 RX ORDER — ONDANSETRON 4 MG/1
4 TABLET, FILM COATED ORAL EVERY 8 HOURS PRN
Qty: 10 TABLET | Refills: 0 | Status: SHIPPED | OUTPATIENT
Start: 2023-08-18 | End: 2023-08-28

## 2023-08-18 RX ORDER — NALOXONE HCL 0.4 MG/ML
0.2 VIAL (ML) INJECTION AS NEEDED
Status: DISCONTINUED | OUTPATIENT
Start: 2023-08-18 | End: 2023-08-18 | Stop reason: HOSPADM

## 2023-08-18 RX ORDER — SODIUM CHLORIDE 0.9 % (FLUSH) 0.9 %
10 SYRINGE (ML) INJECTION EVERY 12 HOURS SCHEDULED
Status: DISCONTINUED | OUTPATIENT
Start: 2023-08-18 | End: 2023-08-18 | Stop reason: HOSPADM

## 2023-08-18 RX ORDER — ACETAMINOPHEN 500 MG
1000 TABLET ORAL 3 TIMES DAILY
Qty: 18 TABLET | Refills: 0
Start: 2023-08-18 | End: 2023-08-21

## 2023-08-18 RX ORDER — ONDANSETRON 2 MG/ML
4 INJECTION INTRAMUSCULAR; INTRAVENOUS EVERY 6 HOURS PRN
Status: DISCONTINUED | OUTPATIENT
Start: 2023-08-18 | End: 2023-08-18 | Stop reason: HOSPADM

## 2023-08-18 RX ORDER — OXYCODONE AND ACETAMINOPHEN 7.5; 325 MG/1; MG/1
1 TABLET ORAL EVERY 4 HOURS PRN
Status: DISCONTINUED | OUTPATIENT
Start: 2023-08-18 | End: 2023-08-18 | Stop reason: HOSPADM

## 2023-08-18 RX ORDER — ONDANSETRON 2 MG/ML
INJECTION INTRAMUSCULAR; INTRAVENOUS AS NEEDED
Status: DISCONTINUED | OUTPATIENT
Start: 2023-08-18 | End: 2023-08-18 | Stop reason: SURG

## 2023-08-18 RX ADMIN — ONDANSETRON 4 MG: 2 INJECTION INTRAMUSCULAR; INTRAVENOUS at 12:20

## 2023-08-18 RX ADMIN — SUGAMMADEX 200 MG: 100 INJECTION, SOLUTION INTRAVENOUS at 11:52

## 2023-08-18 RX ADMIN — SODIUM CHLORIDE, POTASSIUM CHLORIDE, SODIUM LACTATE AND CALCIUM CHLORIDE 9 ML/HR: 600; 310; 30; 20 INJECTION, SOLUTION INTRAVENOUS at 09:38

## 2023-08-18 RX ADMIN — FENTANYL CITRATE 50 MCG: 50 INJECTION, SOLUTION INTRAMUSCULAR; INTRAVENOUS at 11:04

## 2023-08-18 RX ADMIN — PROPOFOL 200 MG: 10 INJECTION, EMULSION INTRAVENOUS at 11:04

## 2023-08-18 RX ADMIN — KETOROLAC TROMETHAMINE 30 MG: 30 INJECTION, SOLUTION INTRAMUSCULAR; INTRAVENOUS at 11:47

## 2023-08-18 RX ADMIN — ONDANSETRON 4 MG: 2 INJECTION INTRAMUSCULAR; INTRAVENOUS at 11:41

## 2023-08-18 RX ADMIN — GLYCOPYRROLATE 0.2 MG: 0.2 INJECTION INTRAMUSCULAR; INTRAVENOUS at 11:04

## 2023-08-18 RX ADMIN — CEFAZOLIN SODIUM 2000 MG: 2 INJECTION, SOLUTION INTRAVENOUS at 10:51

## 2023-08-18 RX ADMIN — ACETAMINOPHEN 1000 MG: 500 TABLET ORAL at 09:37

## 2023-08-18 RX ADMIN — HYDROCODONE BITARTRATE AND ACETAMINOPHEN 1 TABLET: 7.5; 325 TABLET ORAL at 12:29

## 2023-08-18 RX ADMIN — LIDOCAINE HYDROCHLORIDE 100 MG: 20 INJECTION, SOLUTION INFILTRATION; PERINEURAL at 11:04

## 2023-08-18 RX ADMIN — FENTANYL CITRATE 50 MCG: 50 INJECTION, SOLUTION INTRAMUSCULAR; INTRAVENOUS at 12:07

## 2023-08-18 RX ADMIN — HYDROMORPHONE HYDROCHLORIDE 0.5 MG: 1 INJECTION, SOLUTION INTRAMUSCULAR; INTRAVENOUS; SUBCUTANEOUS at 12:21

## 2023-08-18 RX ADMIN — ROCURONIUM BROMIDE 50 MG: 10 INJECTION, SOLUTION INTRAVENOUS at 11:04

## 2023-08-18 RX ADMIN — HYDROMORPHONE HYDROCHLORIDE 0.5 MG: 1 INJECTION, SOLUTION INTRAMUSCULAR; INTRAVENOUS; SUBCUTANEOUS at 11:55

## 2023-08-18 RX ADMIN — PROPOFOL 20 MG: 10 INJECTION, EMULSION INTRAVENOUS at 11:56

## 2023-08-18 RX ADMIN — DEXAMETHASONE SODIUM PHOSPHATE 8 MG: 4 INJECTION, SOLUTION INTRA-ARTICULAR; INTRALESIONAL; INTRAMUSCULAR; INTRAVENOUS; SOFT TISSUE at 11:04

## 2023-08-18 RX ADMIN — PHENYLEPHRINE HYDROCHLORIDE 100 MCG: 10 INJECTION INTRAVENOUS at 11:13

## 2023-08-18 RX ADMIN — OXYCODONE HYDROCHLORIDE 10 MG: 10 TABLET, FILM COATED, EXTENDED RELEASE ORAL at 09:37

## 2023-08-18 NOTE — H&P
Laparoscopic Cholecystectomy :  Oliver  Imani Mazariegos  1965    Procedure Date: 08/18/23    Pre-op Diagnosis:   Biliary dyskinesia    Post-op Diagnosis:   Thickened gallbladder consistent with chronic cholecystitis    Procedure: Laparoscopic cholecystectomy with cholangiogram    Surgeon: Oliver    Assistant: Fish    Indications: right upper quadrant pain    Associated Issues:  Bradycardia without complete work-up  Hyperlipidemia    Findings:   Thickened gallbladder full of liquid requiring aspiration prior to beginning.  Small cystic duct  Normal cholangiogram    Recommendations:   Routine recuperation    Technique:     General anesthetic was given.  IV antibiotics were given (kefzol).  The abdomen was prepped with Chloraprep and draped sterilely.  A small incision was made with 11 blade above the umbilicus, CO2 insufflated via the Veress needle, followed by a 5 mm trocar, and a 5 mm laparoscope.  Three additional trocars were placed under direct vision, being an 11 mm bladeless in the subxiphoid and two 5 mm nondisposables in the right upper quadrant.    The gallbladder was thickened and tight so i aspirated about 60 cc bile.  The cystic duct was dissected out.  It was clipped proximally, opened with the scissors to allow placement of the cholangiocath, which was placed and cholangiogram performed with findings as noted.  This required 2 cuts as the first landed at a valve.  The catheter was then removed and 2 clips were placed on the duct distally and it was divided.  The artery was dissected out.  It was clipped twice proximally, once distally and divided.  The gallbladder was dissected out using the cautery on 20.  The plane between the gallbladder wall and the liver fossa was straightforward .  The gallbladder was placed into an endocatch bag.   Irrigation and suction were carried out to ensure no bilious or bloody efflux from the liver fossa.  The gallbladder was then brought out through the  subxiphoid site, doing so without need to enlarge that site.   The subxiphoid site was then closed at the fascial level with 0 Vicryl suture, figure of 8.  There was some bleeding at that site during closure so i reinsufflated to ensure that there was no continued bleeding and it had stopped with closure.  Then closure was complete with the dermis at that site with 3-0 Vicryl.  Skin at all sites with 5-0 Vicryl.  Exofin applied.    Specimen: gallbladder  EBL: minimal    Erica Boyd MD  08/18/23  12:01 EDT      Assistant provided help with exposure, traction, camera holding, suturing, closure and dressings and was critical to a safe and expeditious procedure.

## 2023-08-18 NOTE — ANESTHESIA POSTPROCEDURE EVALUATION
Patient: Imani Mazariegos    Procedure Summary       Date: 08/18/23 Room / Location: [facility] OR [address] [place] [address] MAIN OR    Anesthesia Start: 1100 Anesthesia Stop: 1204    Procedure: laparoscopic cholecystectomy with cholangiogram, possible open (Abdomen) Diagnosis:       Biliary dyskinesia      (Biliary dyskinesia [K82.8])    Surgeons: Erica Boyd MD Provider: Marques Bowers MD    Anesthesia Type: general ASA Status: 3            Anesthesia Type: general    Vitals  Vitals Value Taken Time   /63 08/18/23 1230   Temp 36.4 øC (97.5 øF) 08/18/23 1230   Pulse 50 08/18/23 1242   Resp 16 08/18/23 1230   SpO2 95 % 08/18/23 1242   Vitals shown include unvalidated device data.        Post Anesthesia Care and Evaluation    Patient location during evaluation: bedside  Patient participation: complete - patient cannot participate  Level of consciousness: awake and alert  Pain management: adequate    Airway patency: patent  Anesthetic complications: No anesthetic complications  PONV Status: controlled  Cardiovascular status: acceptable  Respiratory status: acceptable  Hydration status: acceptable

## 2023-08-18 NOTE — H&P
SURGERY  Imani Mazariegos   1965 08/18/23       Chief Complaint: Right upper quadrant pain     HPI    Here for lap chol with biliary dyskinesia.     Ms. Mazariegos is a very nice 58 y.o. female referred by Mandie Carlton, GI APRN, with epigastric discomfort, described as a burning pain but also episodic right upper quadrant pain associated with severe nausea, radiating to her upper back, worse with fried or greasy foods.     Imaging  [facility] HIDA scan: 5/30/2023, 8% ejection fraction not associated with pain.  CT abdomen and pelvis; 9/11/2022: Colonic diverticulosis.  Of note this was done for abdominal pain and fevers, nausea and vomiting.  Ultrasound gallbladder: none     LABS:  normal LFT's 5/17/2023, fecal occult + stools 2017, with Hgb 15 5/17/2023.  Uptodate with c scope and EGD1/2023 normal ([place])     She had bradycardia during her EGD down to 30, was referred to Dr Langston, Dr. Lorenzana having retired.  She was scheduled for a stress test and echo, which she tells me was canceled at [place] and she is never rescheduled.  I expressed to her today that anesthesia will not put her to sleep without this work-up having been completed.     Medical History        Past Medical History:   Diagnosis Date    Abdominal pain, LLQ      Abdominal pain, LUQ      Back pain, lumbosacral      Bradycardia      Colon polyp 2015    Colon polyps 02/09/2021     Ascending colon: tubular adenoma, sigmoid colon: tubular adenoma    Diverticulitis 12/17/2020    Diverticulitis of colon 09/2013    Diverticulosis      Epilepsy without status epilepticus, not intractable      GERD (gastroesophageal reflux disease)      Hyperlipidemia      Kidney stones      Multiple falls      Seizures      SOB (shortness of breath)      Stroke 1999     unsure if Tia    Symptomatic states associated with artificial menopause      Visual impairment       WEAR GLASSES         Surgical History         Past Surgical History:   Procedure Laterality Date     APPENDECTOMY N/A      SECTION N/A     COLONOSCOPY N/A 2021     Procedure: COLONOSCOPY w/ polypectomy;  Surgeon: Kilo Macedo MD;  Location: [facility] MAIN OR;  Service: Gastroenterology;  Laterality: N/A;    COLONOSCOPY N/A 2015     Sigmoid diverticulosis, Repeat in 10 years-Dr. Erica Boyd, [facility]    COLONOSCOPY W/ POLYPECTOMY N/A      1 polyp, benign     ENDOSCOPY N/A 2021     Procedure: ESOPHAGOGASTRODUODENOSCOPY w/ biopsy;  Surgeon: Kilo Macedo MD;  Location: [facility] MA[facility];  Service: Gastroenterology;  Laterality: N/A;    ENDOSCOPY N/A 2023     Procedure: ESOPHAGOGASTRODUODENOSCOPY;  Surgeon: Kilo Macedo MD;  Location: [facility];  Service: Gastroenterology;  Laterality: N/A;    TOTAL LAPAROSCOPIC HYSTERECTOMY SALPINGO OOPHORECTOMY Bilateral     UPPER GASTROINTESTINAL ENDOSCOPY                  Family History   Problem Relation Age of Onset    Sick sinus syndrome Mother      Heart failure Mother      Pernicious anemia Mother      Hepatitis Mother           Hep C    Other Mother           Peripheral neuropathy    Colon polyps Mother      Diverticulitis Mother      Early death Mother           natural causes    Heart disease Mother           cad    Hyperlipidemia Mother      Hypertension Mother      Sick sinus syndrome Father           pacemaker    Arthritis Father      Cancer Father           prostate    Heart disease Father           Parkinsons/prostate cancer/Aicd    Hyperlipidemia Father      Hypertension Father      Parkinsonism Father      Diabetes Maternal Grandmother      Diabetes Paternal Grandmother      No Known Problems Brother      Stomach cancer Maternal Grandfather      Colon cancer Neg Hx        Social History   Social History            Socioeconomic History    Marital status:     Number of children: 1    Years of education: BSN   Tobacco Use    Smoking status: Never       Passive exposure: Never    Smokeless  "tobacco: Never    Tobacco comments:       caffeine use   Vaping Use    Vaping Use: Never used   Substance and Sexual Activity    Alcohol use: Never    Drug use: Never    Sexual activity: Yes       Partners: Male       Birth control/protection: Post-menopausal               Current Outpatient Medications:     Calcium Citrate-Vitamin D3 (CITRACAL) 315-6.25 MG-MCG tablet tablet, Take  by mouth Daily., Disp: , Rfl:     estradiol (ESTRACE) 0.5 MG tablet, Take 1 tablet by mouth Daily., Disp: 90 tablet, Rfl: 1    ondansetron ODT (ZOFRAN-ODT) 4 MG disintegrating tablet, Dissolve 1 tablet by mouth every 6 hours PRN nausea, vomiting, Disp: 20 tablet, Rfl: 1    pantoprazole (PROTONIX) 40 MG EC tablet, TAKE 1 TABLET BY MOUTH DAILY, Disp: 30 tablet, Rfl: 5    VITAMIN D PO, Take 1 tablet by mouth Daily., Disp: , Rfl:           Allergies   Allergen Reactions    Flagyl [Metronidazole] GI Intolerance         PHYSICAL EXAM:     /78   Ht 167.6 cm (66\")   Wt 85.3 kg (188 lb)   BMI 30.34 kg/mý   Body mass index is 30.34 kg/mý.     Constitutional: well developed, well nourished, appears  healthy, stated age  ENMT: Hearing intact, neck without masses  CVS: RRR, no murmur  Respiratory: CTA, normal respiratory effort   Gastrointestinal: abdomen soft, nontender, abdominal hernia not detected  Musculoskeletal: gait normal, muscle mass normal  Neurological: awake and alert, seems to have reasonable capacity for understanding for medical decision making  Psychiatric: appears to have reasonable judgement, pleasant     Radiographic/Lab Findings:   As above     Reviewed: Cholecystectomy     IMPRESSION:  Biliary dyskinesia, very possibly with stones as well.  Her symptoms are very very classic.  Bradycardia without complete work-up  Hyperlipidemia     PLAN:  We are going to help her get her stress test and echo rescheduled.  She may need to get a Holter monitor at some point.  Her EKG on the day of her visit with the APRN was similar to " prior EKG 2019, with a rate of 48  After the studies are done, I think we can go ahead and schedule her surgery.  Ultrasound the gallbladder.  I just like to know if she has stones prior to the procedure.  This needs to be completed prior to scheduling as well in the event that additional studies are needed based on a finding in the ultrasound.     Erica Boyd MD  4:12 PM     In order to provide a more personal and interactive patient experience as well as improve efficiency, this note was started prior to the office visit, including review of past history and pertinent images, surgeries.     ADDEND  US GB normal.  ECHO and stress to be done 7/31/23 07/26/23     ADDEND  Cardiology has cleared patient as low risk.  08/08/23

## 2023-08-18 NOTE — ANESTHESIA PREPROCEDURE EVALUATION
Anesthesia Evaluation     Patient summary reviewed   NPO Solid Status: > 8 hours             Airway   Mallampati: II  Neck ROM: full  No difficulty expected  Dental      Pulmonary    Cardiovascular     ECG reviewed  Rhythm: regular    (+) hyperlipidemia    ROS comment: bradycardia    Neuro/Psych  (+) seizures well controlled, CVA  GI/Hepatic/Renal/Endo    (+) GERD, renal disease    Musculoskeletal     Abdominal    Substance History      OB/GYN          Other                        Anesthesia Plan    ASA 3     general       Anesthetic plan, risks, benefits, and alternatives have been provided, discussed and informed consent has been obtained with: patient.    Use of blood products discussed with patient .      CODE STATUS:

## 2023-08-18 NOTE — ANESTHESIA PROCEDURE NOTES
Airway  Urgency: elective    Date/Time: 8/18/2023 11:06 AM  Airway not difficult    General Information and Staff    Patient location during procedure: OR  Anesthesiologist: Marques Bowers MD  CRNA/CAA: Renetta Min CRNA    Indications and Patient Condition  Indications for airway management: airway protection    Preoxygenated: yes  Mask difficulty assessment: 2 - vent by mask + OA or adjuvant +/- NMBA    Final Airway Details  Final airway type: endotracheal airway      Successful airway: ETT  Cuffed: yes   Successful intubation technique: direct laryngoscopy  Facilitating devices/methods: intubating stylet and cricoid pressure  Endotracheal tube insertion site: oral  Blade: Timoteo  Blade size: 3  ETT size (mm): 7.0  Cormack-Lehane Classification: grade IIa - partial view of glottis  Placement verified by: chest auscultation and capnometry   Cuff volume (mL): 6  Measured from: teeth  ETT/EBT  to teeth (cm): 21  Number of attempts at approach: 1  Assessment: lips, teeth, and gum same as pre-op and atraumatic intubation

## 2023-08-18 NOTE — ANESTHESIA POSTPROCEDURE EVALUATION
"Patient: Imani Mazariegos    Procedure Summary       Date: 08/18/23 Room / Location: [facility] OR [address] [place] [address] MAIN OR    Anesthesia Start: 1100 Anesthesia Stop: 1204    Procedure: laparoscopic cholecystectomy with cholangiogram, possible open (Abdomen) Diagnosis:       Biliary dyskinesia      (Biliary dyskinesia [K82.8])    Surgeons: Erica Boyd MD Provider: Marques Bowers MD    Anesthesia Type: general ASA Status: 3            Anesthesia Type: general    Vitals  Vitals Value Taken Time   /63 08/18/23 1230   Temp 36.4 øC (97.5 øF) 08/18/23 1230   Pulse 50 08/18/23 1242   Resp 16 08/18/23 1230   SpO2 95 % 08/18/23 1242   Vitals shown include unvalidated device data.        Post Anesthesia Care and Evaluation    Patient location during evaluation: bedside  Patient participation: complete - patient participated  Level of consciousness: awake  Pain management: adequate    Airway patency: patent  Anesthetic complications: No anesthetic complications  PONV Status: controlled  Cardiovascular status: acceptable  Respiratory status: acceptable  Hydration status: acceptable    Comments: /87   Pulse 53   Temp 36.4 øC (97.5 øF) (Oral)   Resp 16   Ht 167.6 cm (66\")   SpO2 100%   BMI 30.47 kg/mý       "

## 2023-08-19 LAB
LAB AP CASE REPORT: NORMAL
PATH REPORT.FINAL DX SPEC: NORMAL
PATH REPORT.GROSS SPEC: NORMAL

## 2023-08-27 NOTE — PROGRESS NOTES
CC: post-op    History of Presenting Illness:   58 year old female who presents today for a post-operative appointment. She recently underwent a laparoscopic cholecystectomy.  She is doing well.  She states today has been the best day, feels fairly normal.  She has had some nausea, denies any vomiting. Denies any fevers or chills. Initially, she was constipated; however, her bowel movements have started to normalize.  She is no longer needing pain medication. She is tolerating a diet.    Surgical History: Laparoscopic cholecystectomy with cholangiogram 8/18/2023 Dr. Boyd    Pathology:   Final Diagnosis   1. Gallbladder, Cholecystectomy:  Benign gallbladder with               A. Chronic cholecystitis.               B. Cholesterolosis.     Review of Systems:  Constitutional: negative for fevers or chills   Respiratory: negative for SOB, cough, hemoptysis or wheezing  Cardiovascular: negative for chest pain, palpitations or peripheral edema  Gastrointestinal: positive for nausea, negative for abdominal pain, vomiting, diarrhea, constipation     Physical Exam:  BMI: 30.34  General: alert and oriented, appropriate, no acute distress  Eyes: No scleral icterus, extraocular movements are intact  Respiratory: There is good bilateral chest expansion, no use of accessory muscles is noted  Cardiovascular: No jugular venous distention or peripheral edema is seen  Gastrointestinal: Soft, nontender, incisions healing, no surrounding erythema, warmth, or drainage, subxiphoid incision with surrounding ecchymosis, residual surgical glue present    Assessment and Plan:   58 year old female status post laparoscopic cholecystectomy with IOC.  She is recovering well.  Her incisions are healing nicely. Pathology reviewed and benign.  Activity restrictions discussed.  Discussed her nausea should continue to improve with time.  Advised her to call the office with any questions or concerns.  Otherwise, she can follow-up in the office as  needed.    Laurie Rodriguez PA-C    [facility] - General Surgery   [address]  [place] [address]    [address], [address]  [place] [address]    Office: [phone]  Fax: [phone]

## 2023-08-28 ENCOUNTER — OFFICE VISIT (OUTPATIENT)
Dept: SURGERY | Facility: CLINIC | Age: 58
End: 2023-08-28
Payer: COMMERCIAL

## 2023-08-28 VITALS
SYSTOLIC BLOOD PRESSURE: 120 MMHG | WEIGHT: 188 LBS | HEIGHT: 66 IN | DIASTOLIC BLOOD PRESSURE: 72 MMHG | BODY MASS INDEX: 30.22 KG/M2

## 2023-08-28 DIAGNOSIS — K81.1 CHRONIC CHOLECYSTITIS: Primary | ICD-10-CM

## 2023-08-28 PROCEDURE — 99024 POSTOP FOLLOW-UP VISIT: CPT

## 2023-09-28 ENCOUNTER — HOSPITAL ENCOUNTER (EMERGENCY)
Facility: HOSPITAL | Age: 58
Discharge: HOME | End: 2023-09-28
Attending: EMERGENCY MEDICINE
Payer: COMMERCIAL

## 2023-09-28 ENCOUNTER — APPOINTMENT (OUTPATIENT)
Dept: MRI IMAGING | Facility: HOSPITAL | Age: 58
End: 2023-09-28
Payer: COMMERCIAL

## 2023-09-28 ENCOUNTER — APPOINTMENT (OUTPATIENT)
Dept: GENERAL RADIOLOGY | Facility: HOSPITAL | Age: 58
End: 2023-09-28
Payer: COMMERCIAL

## 2023-09-28 VITALS
WEIGHT: 191 LBS | BODY MASS INDEX: 30.7 KG/M2 | DIASTOLIC BLOOD PRESSURE: 72 MMHG | RESPIRATION RATE: 16 BRPM | HEIGHT: 66 IN | TEMPERATURE: 96.9 F | SYSTOLIC BLOOD PRESSURE: 108 MMHG | HEART RATE: 55 BPM | OXYGEN SATURATION: 97 %

## 2023-09-28 DIAGNOSIS — M54.41 ACUTE MIDLINE LOW BACK PAIN WITH BILATERAL SCIATICA: Primary | ICD-10-CM

## 2023-09-28 DIAGNOSIS — M54.42 ACUTE MIDLINE LOW BACK PAIN WITH BILATERAL SCIATICA: Primary | ICD-10-CM

## 2023-09-28 LAB
ALBUMIN SERPL-MCNC: 4.6 G/DL (ref 3.5–5.2)
ALBUMIN/GLOB SERPL: 1.6 G/DL
ALP SERPL-CCNC: 120 U/L (ref 39–117)
ALT SERPL W P-5'-P-CCNC: 36 U/L (ref 1–33)
ANION GAP SERPL CALCULATED.3IONS-SCNC: 9 MMOL/L (ref 5–15)
AST SERPL-CCNC: 30 U/L (ref 1–32)
BASOPHILS # BLD AUTO: 0.04 10*3/MM3 (ref 0–0.2)
BASOPHILS NFR BLD AUTO: 0.6 % (ref 0–1.5)
BILIRUB SERPL-MCNC: 0.2 MG/DL (ref 0–1.2)
BILIRUB UR QL STRIP: NEGATIVE
BUN SERPL-MCNC: 16 MG/DL (ref 8–23)
BUN/CREAT SERPL: 19 (ref 7–25)
CALCIUM SPEC-SCNC: 9.5 MG/DL (ref 8.6–10.5)
CHLORIDE SERPL-SCNC: 105 MMOL/L (ref 98–107)
CLARITY UR: CLEAR
CO2 SERPL-SCNC: 27 MMOL/L (ref 22–29)
COLOR UR: YELLOW
CREAT SERPL-MCNC: 0.84 MG/DL (ref 0.57–1)
CRP SERPL-MCNC: 2.79 MG/DL (ref 0–0.5)
D-LACTATE SERPL-SCNC: 1.1 MMOL/L (ref 0.5–2)
DEPRECATED RDW RBC AUTO: 40.3 FL (ref 37–54)
EGFRCR SERPLBLD CKD-EPI 2021: 79.7 ML/MIN/1.73
EOSINOPHIL # BLD AUTO: 0.04 10*3/MM3 (ref 0–0.4)
EOSINOPHIL NFR BLD AUTO: 0.6 % (ref 0.3–6.2)
ERYTHROCYTE [DISTWIDTH] IN BLOOD BY AUTOMATED COUNT: 12.1 % (ref 12.3–15.4)
ERYTHROCYTE [SEDIMENTATION RATE] IN BLOOD: 15 MM/HR (ref 0–30)
GLOBULIN UR ELPH-MCNC: 2.9 GM/DL
GLUCOSE SERPL-MCNC: 100 MG/DL (ref 65–99)
GLUCOSE UR STRIP-MCNC: NEGATIVE MG/DL
HCT VFR BLD AUTO: 43 % (ref 34–46.6)
HGB BLD-MCNC: 14.5 G/DL (ref 12–15.9)
HGB UR QL STRIP.AUTO: NEGATIVE
HOLD SPECIMEN: NORMAL
HOLD SPECIMEN: NORMAL
IMM GRANULOCYTES # BLD AUTO: 0.01 10*3/MM3 (ref 0–0.05)
IMM GRANULOCYTES NFR BLD AUTO: 0.2 % (ref 0–0.5)
INR PPP: 0.93 (ref 0.9–1.1)
KETONES UR QL STRIP: NEGATIVE
LEUKOCYTE ESTERASE UR QL STRIP.AUTO: NEGATIVE
LYMPHOCYTES # BLD AUTO: 1.47 10*3/MM3 (ref 0.7–3.1)
LYMPHOCYTES NFR BLD AUTO: 23.5 % (ref 19.6–45.3)
MCH RBC QN AUTO: 31 PG (ref 26.6–33)
MCHC RBC AUTO-ENTMCNC: 33.7 G/DL (ref 31.5–35.7)
MCV RBC AUTO: 91.9 FL (ref 79–97)
MONOCYTES # BLD AUTO: 0.61 10*3/MM3 (ref 0.1–0.9)
MONOCYTES NFR BLD AUTO: 9.8 % (ref 5–12)
NEUTROPHILS NFR BLD AUTO: 4.08 10*3/MM3 (ref 1.7–7)
NEUTROPHILS NFR BLD AUTO: 65.3 % (ref 42.7–76)
NITRITE UR QL STRIP: NEGATIVE
NRBC BLD AUTO-RTO: 0 /100 WBC (ref 0–0.2)
PH UR STRIP.AUTO: 7 [PH] (ref 5–8)
PLATELET # BLD AUTO: 267 10*3/MM3 (ref 140–450)
PMV BLD AUTO: 9.3 FL (ref 6–12)
POTASSIUM SERPL-SCNC: 4.3 MMOL/L (ref 3.5–5.2)
PROCALCITONIN SERPL-MCNC: 0.05 NG/ML (ref 0–0.25)
PROT SERPL-MCNC: 7.5 G/DL (ref 6–8.5)
PROT UR QL STRIP: NEGATIVE
PROTHROMBIN TIME: 12.5 SECONDS (ref 11.7–14.2)
RBC # BLD AUTO: 4.68 10*6/MM3 (ref 3.77–5.28)
SODIUM SERPL-SCNC: 141 MMOL/L (ref 136–145)
SP GR UR STRIP: 1.02 (ref 1–1.03)
UROBILINOGEN UR QL STRIP: NORMAL
WBC NRBC COR # BLD: 6.25 10*3/MM3 (ref 3.4–10.8)
WHOLE BLOOD HOLD COAG: NORMAL
WHOLE BLOOD HOLD SPECIMEN: NORMAL

## 2023-09-28 PROCEDURE — 85025 COMPLETE CBC W/AUTO DIFF WBC: CPT | Performed by: EMERGENCY MEDICINE

## 2023-09-28 PROCEDURE — 81003 URINALYSIS AUTO W/O SCOPE: CPT | Performed by: EMERGENCY MEDICINE

## 2023-09-28 PROCEDURE — 86140 C-REACTIVE PROTEIN: CPT | Performed by: EMERGENCY MEDICINE

## 2023-09-28 PROCEDURE — 0 GADOBENATE DIMEGLUMINE 529 MG/ML SOLUTION: Performed by: EMERGENCY MEDICINE

## 2023-09-28 PROCEDURE — A9577 INJ MULTIHANCE: HCPCS | Performed by: EMERGENCY MEDICINE

## 2023-09-28 PROCEDURE — 96375 TX/PRO/DX INJ NEW DRUG ADDON: CPT

## 2023-09-28 PROCEDURE — 87040 BLOOD CULTURE FOR BACTERIA: CPT | Performed by: EMERGENCY MEDICINE

## 2023-09-28 PROCEDURE — 72158 MRI LUMBAR SPINE W/O & W/DYE: CPT

## 2023-09-28 PROCEDURE — 80053 COMPREHEN METABOLIC PANEL: CPT | Performed by: EMERGENCY MEDICINE

## 2023-09-28 PROCEDURE — 25010000002 LORAZEPAM PER 2 MG: Performed by: EMERGENCY MEDICINE

## 2023-09-28 PROCEDURE — 25010000002 KETOROLAC TROMETHAMINE PER 15 MG: Performed by: EMERGENCY MEDICINE

## 2023-09-28 PROCEDURE — 85610 PROTHROMBIN TIME: CPT | Performed by: EMERGENCY MEDICINE

## 2023-09-28 PROCEDURE — 84145 PROCALCITONIN (PCT): CPT | Performed by: EMERGENCY MEDICINE

## 2023-09-28 PROCEDURE — 96374 THER/PROPH/DIAG INJ IV PUSH: CPT

## 2023-09-28 PROCEDURE — 85652 RBC SED RATE AUTOMATED: CPT | Performed by: EMERGENCY MEDICINE

## 2023-09-28 PROCEDURE — 99285 EMERGENCY DEPT VISIT HI MDM: CPT

## 2023-09-28 PROCEDURE — 83605 ASSAY OF LACTIC ACID: CPT | Performed by: EMERGENCY MEDICINE

## 2023-09-28 PROCEDURE — 36415 COLL VENOUS BLD VENIPUNCTURE: CPT

## 2023-09-28 PROCEDURE — 71045 X-RAY EXAM CHEST 1 VIEW: CPT

## 2023-09-28 RX ORDER — LORAZEPAM 2 MG/ML
1 INJECTION INTRAMUSCULAR ONCE
Status: COMPLETED | OUTPATIENT
Start: 2023-09-28 | End: 2023-09-28

## 2023-09-28 RX ORDER — KETOROLAC TROMETHAMINE 15 MG/ML
15 INJECTION, SOLUTION INTRAMUSCULAR; INTRAVENOUS ONCE
Status: COMPLETED | OUTPATIENT
Start: 2023-09-28 | End: 2023-09-28

## 2023-09-28 RX ORDER — PANTOPRAZOLE SODIUM 40 MG/1
40 TABLET, DELAYED RELEASE ORAL DAILY
Qty: 30 TABLET | Refills: 5 | Status: SHIPPED | OUTPATIENT
Start: 2023-09-28

## 2023-09-28 RX ORDER — METHYLPREDNISOLONE 4 MG/1
TABLET ORAL
Qty: 21 TABLET | Refills: 0 | Status: SHIPPED | OUTPATIENT
Start: 2023-09-28

## 2023-09-28 RX ORDER — SODIUM CHLORIDE 0.9 % (FLUSH) 0.9 %
10 SYRINGE (ML) INJECTION AS NEEDED
Status: DISCONTINUED | OUTPATIENT
Start: 2023-09-28 | End: 2023-09-29 | Stop reason: HOSPADM

## 2023-09-28 RX ORDER — LIDOCAINE 50 MG/G
1 PATCH TOPICAL EVERY 24 HOURS
Qty: 15 PATCH | Refills: 0 | Status: SHIPPED | OUTPATIENT
Start: 2023-09-28

## 2023-09-28 RX ADMIN — LORAZEPAM 1 MG: 2 INJECTION INTRAMUSCULAR; INTRAVENOUS at 20:40

## 2023-09-28 RX ADMIN — KETOROLAC TROMETHAMINE 15 MG: 15 INJECTION, SOLUTION INTRAMUSCULAR; INTRAVENOUS at 20:40

## 2023-09-28 RX ADMIN — GADOBENATE DIMEGLUMINE 18 ML: 529 INJECTION, SOLUTION INTRAVENOUS at 21:27

## 2023-09-28 NOTE — ED PROVIDER NOTES
EMERGENCY DEPARTMENT ENCOUNTER    Room Number:  26/26  Date seen:  9/29/2023  PCP: Jim Amos APRN  Historian: Patient      HPI:  Chief Complaint: Low back pain  Context: Imani Mazariegos is a 58 y.o. female who presents to the ED c/o progressively worsening lower back pain and sciatica over the past month.  The patient was seen by orthopedics-Dr. Liu today who sent her to the emergency room concerned she may have discitis and requested blood work and an MRI of her lumbar spine.  The patient states that Monday night she had a fever to 101 but denies fever since then.  She states she has a history of chronic sciatica and thought that what was going on expect go to the doctor today and get a shot but instead was told to come the emergency room for evaluation.  Of note is the patient had her gallbladder out in August of this year and states she has been doing well since that time.      PAST MEDICAL HISTORY  Active Ambulatory Problems     Diagnosis Date Noted    Symptomatic states associated with artificial menopause     Back pain, lumbosacral     Hyperlipidemia     Flushing 07/02/2013    Seizure 10/13/2021    Vaginal atrophy 07/02/2013    Diverticulitis 11/13/2021    Gastroesophageal reflux disease 02/02/2022    Hormone replacement therapy (HRT) 02/02/2022    Healthcare maintenance 02/02/2022    Nausea and vomiting 10/05/2022    Left upper quadrant abdominal pain 10/05/2022    Bloating 10/05/2022    Biliary dyskinesia 07/14/2023     Resolved Ambulatory Problems     Diagnosis Date Noted    Epilepsy without status epilepticus, not intractable     Abdominal pain, LLQ     Abdominal pain, LUQ      Past Medical History:   Diagnosis Date    Anxiety     Bradycardia     Cholecystitis     Colon polyps 02/09/2021    Diverticulitis of colon 09/2013    Elevated cholesterol     GERD (gastroesophageal reflux disease)     History of methicillin resistant staphylococcus aureus (MRSA)     IBS (irritable bowel syndrome)     Kidney  stones     Sciatica     Seizures     Stroke     Urinary retention     Visual impairment          REVIEW OF SYSTEMS  All systems reviewed and negative except for those discussed in HPI.       PAST SURGICAL HISTORY  Past Surgical History:   Procedure Laterality Date    APPENDECTOMY N/A      SECTION N/A     CHOLECYSTECTOMY WITH INTRAOPERATIVE CHOLANGIOGRAM N/A 2023    Procedure: laparoscopic cholecystectomy with cholangiogram, possible open;  Surgeon: Erica Boyd MD;  Location: [facility] MAIN OR;  Service: General;  Laterality: N/A;    COLONOSCOPY N/A 2021    Procedure: COLONOSCOPY w/ polypectomy;  Surgeon: Kilo Macedo MD;  Location: [facility] [place] MAIN OR;  Service: Gastroenterology;  Laterality: N/A;    COLONOSCOPY N/A 2015    Sigmoid diverticulosis, Repeat in 10 years-Dr. Erica Boyd, [facility]    COLONOSCOPY W/ POLYPECTOMY N/A     1 polyp, benign     ENDOSCOPY N/A 2021    Procedure: ESOPHAGOGASTRODUODENOSCOPY w/ biopsy;  Surgeon: Kilo Macedo MD;  Location: [facility] [place] MAIN OR;  Service: Gastroenterology;  Laterality: N/A;    ENDOSCOPY N/A 2023    Procedure: ESOPHAGOGASTRODUODENOSCOPY;  Surgeon: Kilo Macedo MD;  Location: [facility] [place] MAIN OR;  Service: Gastroenterology;  Laterality: N/A;    TOTAL LAPAROSCOPIC HYSTERECTOMY SALPINGO OOPHORECTOMY Bilateral     UPPER GASTROINTESTINAL ENDOSCOPY           FAMILY HISTORY  Family History   Problem Relation Age of Onset    Sick sinus syndrome Mother     Heart failure Mother     Pernicious anemia Mother     Hepatitis Mother         Hep C    Other Mother         Peripheral neuropathy    Colon polyps Mother     Diverticulitis Mother     Early death Mother         natural causes    Heart disease Mother         cad    Hyperlipidemia Mother     Hypertension Mother     Sick sinus syndrome Father         pacemaker    Arthritis Father     Cancer Father         prostate    Heart disease Father         Parkinsons/prostate  cancer/Aicd    Hyperlipidemia Father     Hypertension Father     Parkinsonism Father     No Known Problems Brother     Diabetes Maternal Grandmother     Stomach cancer Maternal Grandfather     Diabetes Paternal Grandmother     Colon cancer Neg Hx     Malig Hyperthermia Neg Hx          SOCIAL HISTORY  Social History     Socioeconomic History    Marital status:     Number of children: 1    Years of education: BSN   Tobacco Use    Smoking status: Never     Passive exposure: Never    Smokeless tobacco: Never    Tobacco comments:     caffeine use   Vaping Use    Vaping Use: Never used   Substance and Sexual Activity    Alcohol use: Never    Drug use: Never    Sexual activity: Yes     Partners: Male     Birth control/protection: Post-menopausal         ALLERGIES  Flagyl [metronidazole]      PHYSICAL EXAM  ED Triage Vitals   Temp Heart Rate Resp BP SpO2   09/28/23 1610 09/28/23 1610 09/28/23 1629 09/28/23 1629 09/28/23 1610   96.9 °F (36.1 °C) 53 16 121/71 99 %      Temp src Heart Rate Source Patient Position BP Location FiO2 (%)   -- -- -- -- --              Physical Exam      GENERAL: 58-year-old female in no acute distress  HENT: NCAT: nares patent: Neck supple  EYES: no scleral icterus  CV: regular rhythm, normal rate  RESPIRATORY: normal effort  ABDOMEN: soft, NTND: Bowel sounds positive  MUSCULOSKELETAL: no deformity  Back: Tenderness in the lumbar spine  NEURO: alert and oriented x3 the patient has 5/5 strength in bilateral upper and lower extremities.  PSYCH:  calm, cooperative  SKIN: warm, dry    Vital signs and nursing notes reviewed.      LAB RESULTS  Recent Results (from the past 24 hour(s))   Green Top (Gel)    Collection Time: 09/28/23  4:40 PM   Result Value Ref Range    Extra Tube Hold for add-ons.    Lavender Top    Collection Time: 09/28/23  4:40 PM   Result Value Ref Range    Extra Tube hold for add-on    Gold Top - SST    Collection Time: 09/28/23  4:40 PM   Result Value Ref Range    Extra Tube  Hold for add-ons.    Light Blue Top    Collection Time: 09/28/23  4:40 PM   Result Value Ref Range    Extra Tube Hold for add-ons.    Comprehensive Metabolic Panel    Collection Time: 09/28/23  4:40 PM    Specimen: Blood   Result Value Ref Range    Glucose 100 (H) 65 - 99 mg/dL    BUN 16 8 - 23 mg/dL    Creatinine 0.84 0.57 - 1.00 mg/dL    Sodium 141 136 - 145 mmol/L    Potassium 4.3 3.5 - 5.2 mmol/L    Chloride 105 98 - 107 mmol/L    CO2 27.0 22.0 - 29.0 mmol/L    Calcium 9.5 8.6 - 10.5 mg/dL    Total Protein 7.5 6.0 - 8.5 g/dL    Albumin 4.6 3.5 - 5.2 g/dL    ALT (SGPT) 36 (H) 1 - 33 U/L    AST (SGOT) 30 1 - 32 U/L    Alkaline Phosphatase 120 (H) 39 - 117 U/L    Total Bilirubin 0.2 0.0 - 1.2 mg/dL    Globulin 2.9 gm/dL    A/G Ratio 1.6 g/dL    BUN/Creatinine Ratio 19.0 7.0 - 25.0    Anion Gap 9.0 5.0 - 15.0 mmol/L    eGFR 79.7 >60.0 mL/min/1.73   Protime-INR    Collection Time: 09/28/23  4:40 PM    Specimen: Blood   Result Value Ref Range    Protime 12.5 11.7 - 14.2 Seconds    INR 0.93 0.90 - 1.10   Procalcitonin    Collection Time: 09/28/23  4:40 PM    Specimen: Blood   Result Value Ref Range    Procalcitonin 0.05 0.00 - 0.25 ng/mL   Sedimentation Rate    Collection Time: 09/28/23  4:40 PM    Specimen: Blood   Result Value Ref Range    Sed Rate 15 0 - 30 mm/hr   C-reactive Protein    Collection Time: 09/28/23  4:40 PM    Specimen: Blood   Result Value Ref Range    C-Reactive Protein 2.79 (H) 0.00 - 0.50 mg/dL   CBC Auto Differential    Collection Time: 09/28/23  4:40 PM    Specimen: Blood   Result Value Ref Range    WBC 6.25 3.40 - 10.80 10*3/mm3    RBC 4.68 3.77 - 5.28 10*6/mm3    Hemoglobin 14.5 12.0 - 15.9 g/dL    Hematocrit 43.0 34.0 - 46.6 %    MCV 91.9 79.0 - 97.0 fL    MCH 31.0 26.6 - 33.0 pg    MCHC 33.7 31.5 - 35.7 g/dL    RDW 12.1 (L) 12.3 - 15.4 %    RDW-SD 40.3 37.0 - 54.0 fl    MPV 9.3 6.0 - 12.0 fL    Platelets 267 140 - 450 10*3/mm3    Neutrophil % 65.3 42.7 - 76.0 %    Lymphocyte % 23.5 19.6 -  45.3 %    Monocyte % 9.8 5.0 - 12.0 %    Eosinophil % 0.6 0.3 - 6.2 %    Basophil % 0.6 0.0 - 1.5 %    Immature Grans % 0.2 0.0 - 0.5 %    Neutrophils, Absolute 4.08 1.70 - 7.00 10*3/mm3    Lymphocytes, Absolute 1.47 0.70 - 3.10 10*3/mm3    Monocytes, Absolute 0.61 0.10 - 0.90 10*3/mm3    Eosinophils, Absolute 0.04 0.00 - 0.40 10*3/mm3    Basophils, Absolute 0.04 0.00 - 0.20 10*3/mm3    Immature Grans, Absolute 0.01 0.00 - 0.05 10*3/mm3    nRBC 0.0 0.0 - 0.2 /100 WBC   Lactic Acid, Plasma    Collection Time: 09/28/23  6:32 PM    Specimen: Blood   Result Value Ref Range    Lactate 1.1 0.5 - 2.0 mmol/L   Urinalysis With Microscopic If Indicated (No Culture) - Urine, Clean Catch    Collection Time: 09/28/23  6:43 PM    Specimen: Urine, Clean Catch   Result Value Ref Range    Color, UA Yellow Yellow, Straw    Appearance, UA Clear Clear    pH, UA 7.0 5.0 - 8.0    Specific Gravity, UA 1.017 1.005 - 1.030    Glucose, UA Negative Negative    Ketones, UA Negative Negative    Bilirubin, UA Negative Negative    Blood, UA Negative Negative    Protein, UA Negative Negative    Leuk Esterase, UA Negative Negative    Nitrite, UA Negative Negative    Urobilinogen, UA 1.0 E.U./dL 0.2 - 1.0 E.U./dL       Ordered the above labs and reviewed the results.        RADIOLOGY  XR Chest 1 View    Result Date: 9/28/2023  XR CHEST 1 VW-9/28/2023  HISTORY: Cough.  Heart size is within normal limits. Lungs appear clear. Bones and soft tissues are unremarkable.      1. No acute process.   This report was finalized on 9/28/2023 6:01 PM by Dr. Seamus Lozano M.D.      MRI Lumbar Spine With & Without Contrast    Result Date: 9/28/2023  MRI LUMBAR SPINE W WO CONTRAST-9/28/2023  HISTORY: Low back pain. Left leg pain. Fevers.  Multiple pre and postcontrast sagittal and axial images were obtained through the lumbar spine.  There is normal alignment. There is minimal disc desiccation at L3-4 with very minimal broad-based disc bulge. The other discs  appear relatively normal. Lower cord and conus appears within normal limits.  There is no evidence of discitis or osteomyelitis.  No abnormal enhancement is seen.      1. No acute process. There is no evidence of discitis, osteomyelitis or other acute finding.   This report was finalized on 9/28/2023 10:42 PM by Dr. Seamus Lozano M.D.       Ordered the above noted radiological studies. Reviewed by me in PACS.            PROCEDURES  Procedures          MEDICATIONS GIVEN IN ER  Medications   LORazepam (ATIVAN) injection 1 mg (1 mg Intravenous Given 9/28/23 2040)   ketorolac (TORADOL) injection 15 mg (15 mg Intravenous Given 9/28/23 2040)   gadobenate dimeglumine (MULTIHANCE) injection 18 mL (18 mL Intravenous Given 9/28/23 2127)             MEDICAL DECISION MAKING, PROGRESS, and CONSULTS    All labs have been independently reviewed by me.  All radiology studies have been reviewed by me and I have also reviewed the radiology report.   EKG's independently viewed and interpreted by me.  Discussion below represents my analysis of pertinent findings related to patient's condition, differential diagnosis, treatment plan and final disposition.      Additional sources:  - Discussed/ obtained information from independent historians: Patient's  states the patient has had this issue on and off in the past but has become progressively worse    - External (non-ED) record review: I reviewed the patient's office note from Dr. Liu from orthopedics today.  He states the patient's had low back pain with left lower extremity radiculopathy with hyperreflexive changes with fevers.  States the patient's had a recent cholecystectomy.  Dr. Liu sent the patient to the ER for blood work and MRI of the lumbar spine to rule out discitis.    - Chronic or social conditions impacting care: Patient lives at home with     - Shared decision making: After shared decision-making discussion from myself, the patient and her   agree she is stable for discharge and outpatient follow-up with unremarkable MRI      Orders placed during this visit:  Orders Placed This Encounter   Procedures    Blood Culture - Blood,    Blood Culture - Blood,    MRI Lumbar Spine With & Without Contrast    XR Chest 1 View    [place] Draw    Comprehensive Metabolic Panel    Protime-INR    Urinalysis With Microscopic If Indicated (No Culture) - Urine, Clean Catch    Lactic Acid, Plasma    Procalcitonin    Sedimentation Rate    C-reactive Protein    CBC Auto Differential    Green Top (Gel)    Lavender Top    Gold Top - SST    Light Blue Top    CBC & Differential         Differential diagnosis:  My differential diagnosis includes but is not limited to discitis, epidural hematoma, epidural abscess, lumbar radiculopathy, spinal stenosis acute on chronic back pain      Independent interpretation of labs, radiology studies, and discussions with consultants:  ED Course as of 09/29/23 0626   Thu Sep 28, 2023   1721 I will order labs, urinalysis and MRI of the lumbar spine as requested. [GP]   1851 My independent interpretation the patient's chest x-ray is negative acute.  The patient's urinalysis is normal [GP]   1851 Patient's white count, lactic acid and procalcitonin are normal.  Her sed rate is 15 but her CRP is mildly elevated at 2.79. [GP]   1942 Currently we are still awaiting the patient to go to MRI. [GP]   2027 MRI just called and asked that we premedicate the patient before we bring her down. [GP]   2027 On repeat evaluation I advised the patient that she should be going to MRI shortly.  The patient states that she has some discomfort in her low back now after lying on the bed for this long.  She states normally she will take Aleve.  I will give the patient a dose of Toradol.  The patient states she feels well and would like to go home.  I advised her that after the MRI if it is unremarkable then we can discharge her home with anti-inflammatories and outpatient  follow-up with Dr. Liu.  She understands and agrees with the plan. [GP]   2308 MRI shows no discitis.  Plan to treat with NSAID, lidocaine patches and follow-up with her orthopedist.  I discussed the results with the patient.  There are no questions at this time.  Vitals are stable and she is well-appearing.  Will discharge at this time. [RC]   2309 IMPRESSION:  1. No acute process. There is no evidence of discitis, osteomyelitis or  other acute finding.        This report was finalized on 9/28/2023 10:42 PM by Dr. Seamus Lozano M.D.      [RC]      ED Course User Index  [GP] Brandon Byrd MD  [RC] Jarad Redd III, PA               DIAGNOSIS  Final diagnoses:   Acute midline low back pain with bilateral sciatica         DISPOSITION  Discharged            Latest Documented Vital Signs:  As of 06:26 EDT  BP- 108/72 HR- 55 Temp- 96.9 °F (36.1 °C) O2 sat- 97%--      --------------------  Please note that portions of this were completed with a voice recognition program.       Note Disclaimer: At [facility], we believe that sharing information builds trust and better relationships. You are receiving this note because you are receiving care at [facility] or recently visited. It is possible you will see health information before a provider has talked with you about it. This kind of information can be easy to misunderstand. To help you fully understand what it means for your health, we urge you to discuss this note with your provider.             Brandon Byrd MD  09/29/23 0628

## 2023-10-03 LAB
BACTERIA SPEC AEROBE CULT: NORMAL
BACTERIA SPEC AEROBE CULT: NORMAL

## 2023-10-03 RX ORDER — FAMOTIDINE 40 MG/1
40 TABLET, FILM COATED ORAL DAILY
Qty: 30 TABLET | Refills: 5 | OUTPATIENT
Start: 2023-10-03

## 2024-02-27 ENCOUNTER — APPOINTMENT (OUTPATIENT)
Dept: WOMENS IMAGING | Facility: HOSPITAL | Age: 59
End: 2024-02-27
Payer: COMMERCIAL

## 2024-02-27 PROCEDURE — 77063 BREAST TOMOSYNTHESIS BI: CPT | Performed by: RADIOLOGY

## 2024-02-27 PROCEDURE — 77067 SCR MAMMO BI INCL CAD: CPT | Performed by: RADIOLOGY

## 2024-03-01 ENCOUNTER — OFFICE VISIT (OUTPATIENT)
Dept: INTERNAL MEDICINE | Facility: CLINIC | Age: 59
End: 2024-03-01
Payer: COMMERCIAL

## 2024-03-01 VITALS
DIASTOLIC BLOOD PRESSURE: 90 MMHG | OXYGEN SATURATION: 100 % | SYSTOLIC BLOOD PRESSURE: 134 MMHG | HEIGHT: 66 IN | HEART RATE: 47 BPM | WEIGHT: 195.4 LBS | TEMPERATURE: 98.2 F | BODY MASS INDEX: 31.4 KG/M2

## 2024-03-01 DIAGNOSIS — Z00.00 ANNUAL PHYSICAL EXAM: Primary | ICD-10-CM

## 2024-03-01 DIAGNOSIS — R00.1 BRADYCARDIA: ICD-10-CM

## 2024-03-01 LAB
ALBUMIN SERPL-MCNC: 4.4 G/DL (ref 3.5–5.2)
ALBUMIN/GLOB SERPL: 1.9 G/DL
ALP SERPL-CCNC: 100 U/L (ref 39–117)
ALT SERPL-CCNC: 28 U/L (ref 1–33)
APPEARANCE UR: CLEAR
AST SERPL-CCNC: 26 U/L (ref 1–32)
BASOPHILS # BLD AUTO: 0.04 10*3/MM3 (ref 0–0.2)
BASOPHILS NFR BLD AUTO: 0.9 % (ref 0–1.5)
BILIRUB SERPL-MCNC: 0.4 MG/DL (ref 0–1.2)
BILIRUB UR QL STRIP: NEGATIVE
BUN SERPL-MCNC: 20 MG/DL (ref 8–23)
BUN/CREAT SERPL: 23.8 (ref 7–25)
CALCIUM SERPL-MCNC: 9.5 MG/DL (ref 8.6–10.5)
CHLORIDE SERPL-SCNC: 106 MMOL/L (ref 98–107)
CHOLEST SERPL-MCNC: 193 MG/DL (ref 0–200)
CHOLEST/HDLC SERPL: 2.97 {RATIO}
CO2 SERPL-SCNC: 22.3 MMOL/L (ref 22–29)
COLOR UR: YELLOW
CREAT SERPL-MCNC: 0.84 MG/DL (ref 0.57–1)
EGFRCR SERPLBLD CKD-EPI 2021: 79.7 ML/MIN/1.73
EOSINOPHIL # BLD AUTO: 0.07 10*3/MM3 (ref 0–0.4)
EOSINOPHIL NFR BLD AUTO: 1.5 % (ref 0.3–6.2)
ERYTHROCYTE [DISTWIDTH] IN BLOOD BY AUTOMATED COUNT: 12.3 % (ref 12.3–15.4)
GLOBULIN SER CALC-MCNC: 2.3 GM/DL
GLUCOSE SERPL-MCNC: 103 MG/DL (ref 65–99)
GLUCOSE UR QL STRIP: NEGATIVE
HCT VFR BLD AUTO: 44.8 % (ref 34–46.6)
HDLC SERPL-MCNC: 65 MG/DL (ref 40–60)
HGB BLD-MCNC: 15.1 G/DL (ref 12–15.9)
HGB UR QL STRIP: NEGATIVE
IMM GRANULOCYTES # BLD AUTO: 0.01 10*3/MM3 (ref 0–0.05)
IMM GRANULOCYTES NFR BLD AUTO: 0.2 % (ref 0–0.5)
KETONES UR QL STRIP: NEGATIVE
LDLC SERPL CALC-MCNC: 112 MG/DL (ref 0–100)
LEUKOCYTE ESTERASE UR QL STRIP: NEGATIVE
LYMPHOCYTES # BLD AUTO: 1.46 10*3/MM3 (ref 0.7–3.1)
LYMPHOCYTES NFR BLD AUTO: 31.3 % (ref 19.6–45.3)
MCH RBC QN AUTO: 30.6 PG (ref 26.6–33)
MCHC RBC AUTO-ENTMCNC: 33.7 G/DL (ref 31.5–35.7)
MCV RBC AUTO: 90.7 FL (ref 79–97)
MONOCYTES # BLD AUTO: 0.54 10*3/MM3 (ref 0.1–0.9)
MONOCYTES NFR BLD AUTO: 11.6 % (ref 5–12)
NEUTROPHILS # BLD AUTO: 2.55 10*3/MM3 (ref 1.7–7)
NEUTROPHILS NFR BLD AUTO: 54.5 % (ref 42.7–76)
NITRITE UR QL STRIP: NEGATIVE
NRBC BLD AUTO-RTO: 0 /100 WBC (ref 0–0.2)
PH UR STRIP: 6 [PH] (ref 5–8)
PLATELET # BLD AUTO: 288 10*3/MM3 (ref 140–450)
POTASSIUM SERPL-SCNC: 5.2 MMOL/L (ref 3.5–5.2)
PROT SERPL-MCNC: 6.7 G/DL (ref 6–8.5)
PROT UR QL STRIP: NEGATIVE
RBC # BLD AUTO: 4.94 10*6/MM3 (ref 3.77–5.28)
SODIUM SERPL-SCNC: 143 MMOL/L (ref 136–145)
SP GR UR STRIP: 1.02 (ref 1–1.03)
TRIGL SERPL-MCNC: 91 MG/DL (ref 0–150)
TSH SERPL DL<=0.005 MIU/L-ACNC: 2.69 UIU/ML (ref 0.27–4.2)
UROBILINOGEN UR STRIP-MCNC: NORMAL MG/DL
VLDLC SERPL CALC-MCNC: 16 MG/DL (ref 5–40)
WBC # BLD AUTO: 4.67 10*3/MM3 (ref 3.4–10.8)

## 2024-03-01 PROCEDURE — 99396 PREV VISIT EST AGE 40-64: CPT

## 2024-03-01 NOTE — PROGRESS NOTES
"Chief Complaint  Annual Exam    Subjective        Imani Mazariegos presents to [facility] PRIMARY [facility]ARE  History of Present Illness  58-year-old female presenting for annual exam.  Is considering retiring due to stress at work.  States that she is a \"stress magnet\".  Has not been sleeping well.  Physical exercise has decreased over the winter due to weather.  Typically likes to walk 3 miles a day.  Stop taking fish oil supplement due to possible side effects of night sweats.  Also stopped taking estradiol due to insurance coverage.    Had cholecystectomy performed by Dr. Boyd last year.  Is tolerating well.  Has stopped most medications since surgery has significantly improved quality of life.  During surgery, heart rate was low.  Require cardiac clearance with pharmaceutical intervention to keep heart rate up during surgery.  States that she typically runs low at home.  Her smart watch often wakes her up at night showing heart rate to be in the 30s.  Has started having more symptoms of bradycardia such as cold extremities and acute vision changes with exertion.  Denies chest pain, shortness of breath, difficulty breathing.      Objective   Vital Signs:  /90 (BP Location: Left arm, Patient Position: Sitting, Cuff Size: Large Adult)   Pulse (!) 47   Temp 98.2 °F (36.8 °C)   Ht 167.6 cm (66\")   Wt 88.6 kg (195 lb 6.4 oz)   SpO2 100%   BMI 31.54 kg/m²   Estimated body mass index is 31.54 kg/m² as calculated from the following:    Height as of this encounter: 167.6 cm (66\").    Weight as of this encounter: 88.6 kg (195 lb 6.4 oz).               Physical Exam  Vitals reviewed.   Constitutional:       Appearance: Normal appearance.   HENT:      Head: Normocephalic.   Eyes:      Pupils: Pupils are equal, round, and reactive to light.   Cardiovascular:      Rate and Rhythm: Normal rate.      Pulses: Normal pulses.      Heart sounds: Normal heart sounds.   Pulmonary:      Effort: Pulmonary " effort is normal.      Breath sounds: Normal breath sounds.   Musculoskeletal:         General: Normal range of motion.      Cervical back: Normal range of motion.   Skin:     General: Skin is warm and dry.      Capillary Refill: Capillary refill takes less than 2 seconds.   Neurological:      General: No focal deficit present.      Mental Status: She is alert and oriented to person, place, and time.   Psychiatric:         Mood and Affect: Mood normal.         Behavior: Behavior normal.         Thought Content: Thought content normal.         Judgment: Judgment normal.        Result Review :      Common labs          5/17/2023    13:38 8/15/2023    16:05 9/28/2023    16:40   Common Labs   Glucose 95  85  100    BUN 19  17  16    Creatinine 0.86  0.85  0.84    Sodium 140  141  141    Potassium 4.4  4.4  4.3    Chloride 105  105  105    Calcium 9.0  9.3  9.5    Albumin 4.3   4.6    Total Bilirubin 0.5   0.2    Alkaline Phosphatase 78   120    AST (SGOT) 24   30    ALT (SGPT) 24   36    WBC 5.45  5.47  6.25    Hemoglobin 15.0  14.8  14.5    Hematocrit 43.5  43.5  43.0    Platelets 209  240  267          Current Outpatient Medications on File Prior to Visit   Medication Sig Dispense Refill    Calcium Citrate-Vitamin D3 (CITRACAL) 315-6.25 MG-MCG tablet tablet Take 1 tablet by mouth Daily.      pantoprazole (PROTONIX) 40 MG EC tablet TAKE 1 TABLET BY MOUTH DAILY 30 tablet 5    [DISCONTINUED] diclofenac (VOLTAREN) 50 MG EC tablet Take 1 tablet by mouth 3 (Three) Times a Day. 21 tablet 0    [DISCONTINUED] estradiol (ESTRACE) 0.5 MG tablet Take 1 tablet by mouth Daily. 90 tablet 1    [DISCONTINUED] famotidine (PEPCID) 20 MG tablet Take 1 tablet by mouth Daily.      [DISCONTINUED] lidocaine (LIDODERM) 5 % Place 1 patch on the skin as directed by provider Daily. Remove & Discard patch within 12 hours or as directed by MD 15 patch 0    [DISCONTINUED] methylPREDNISolone (MEDROL) 4 MG dose pack Take as directed on package  instructions. 21 tablet 0    [DISCONTINUED] Omega-3 Fatty Acids (fish oil) 1000 MG capsule capsule Take  by mouth Daily With Breakfast.      [DISCONTINUED] ondansetron ODT (ZOFRAN-ODT) 4 MG disintegrating tablet Dissolve 1 tablet by mouth every 6 hours PRN nausea, vomiting 20 tablet 1    [DISCONTINUED] saccharomyces boulardii (FLORASTOR) 250 MG capsule Take 1 capsule by mouth 2 (Two) Times a Day.       No current facility-administered medications on file prior to visit.              Assessment and Plan     Diagnoses and all orders for this visit:    1. Annual physical exam (Primary)  -     Comprehensive Metabolic Panel  -     Urinalysis With Microscopic If Indicated (No Culture) - Urine, Clean Catch  -     Lipid Panel With / Chol / HDL Ratio  -     TSH Rfx On Abnormal To Free T4  -     CBC & Differential    2. Bradycardia  -     Ambulatory Referral to Cardiac Electrophysiology      Patient Instructions   Continue medications as prescribed. Increase activity as tolerated. Stay hydrated with water.  Labs today. Referral to cardiac interventionist to be ordered. Scheduling center to call with appointment. Follow-up in 6 months for recheck.            Follow Up     Return in about 6 months (around 9/1/2024) for Recheck.  Patient was given instructions and counseling regarding her condition or for health maintenance advice. Please see specific information pulled into the AVS if appropriate.

## 2024-03-01 NOTE — PATIENT INSTRUCTIONS
Continue medications as prescribed. Increase activity as tolerated. Stay hydrated with water.  Labs today. Referral to cardiac interventionist to be ordered. Scheduling center to call with appointment. Follow-up in 6 months for recheck.

## 2024-03-04 NOTE — PROGRESS NOTES
Urinalysis is clear.    Thyroid levels within normal limits.  No signs of thyroid disease.    CBC shows no signs of infection or anemia.    Metabolic panel is unremarkable.  No signs of kidney injury, liver injury or electrolyte imbalance.    Cholesterol levels have improved slightly over the past year.  LDL (bad cholesterol) currently 112.  Goal of 100 or lower.  Previous level 131. Recommend limiting intake of red meat, pork, fried foods and high fat dairy products. Limit intake of alcohol. Recommend at least 30 minutes of heart elevating exercises three days a week as tolerated.

## 2024-03-29 ENCOUNTER — OFFICE VISIT (OUTPATIENT)
Age: 59
End: 2024-03-29
Payer: COMMERCIAL

## 2024-03-29 VITALS
DIASTOLIC BLOOD PRESSURE: 92 MMHG | WEIGHT: 193 LBS | HEIGHT: 66 IN | HEART RATE: 76 BPM | BODY MASS INDEX: 31.02 KG/M2 | SYSTOLIC BLOOD PRESSURE: 134 MMHG

## 2024-03-29 DIAGNOSIS — R00.1 BRADYCARDIA, SINUS: Primary | ICD-10-CM

## 2024-03-29 NOTE — PROGRESS NOTES
Date of Office Visit: 2024  Encounter Provider: Nav Ellison MD  Place of Service: [facility] CARDIOLOGY  Patient Name: Imani Mazariegos  : 1965    Subjective:     Encounter Date:2024      Patient ID: Imani Mazariegos is a 58 y.o. female who has a cc of  referred by Jim Amos for slow heart rate.     Normal echo in    Normal Nuclear in  (7 min to peak hr 140)     She had a gall bladder in .     She reports fatigue, low strength in upper extremities     She is cold a lot.    Also fontana   There have been no hospital admission since the last visit.     There have been no bleeding events.       Past Medical History:   Diagnosis Date    Abdominal pain, LLQ     Abdominal pain, LUQ     Anxiety     Bradycardia     HR OFTEN STAYS IN 30S WITH ANESTHESIA    Cholecystitis     Colon polyps 2021    Ascending colon: tubular adenoma, sigmoid colon: tubular adenoma    Diverticulitis of colon 2013    Elevated cholesterol     Epilepsy without status epilepticus, not intractable     GERD (gastroesophageal reflux disease)     History of methicillin resistant staphylococcus aureus (MRSA)     IN AMNIOTIC FLUID    IBS (irritable bowel syndrome)     Kidney stones     Sciatica     Seizures     AS A CHILD- NO LONGER ON MEDICATIONS AT THIS TIME. LAST SEIZURE APPROX.     Stroke     RIGHT SIDE WEAKNESS, UNKNOWN CAUSE    Symptomatic states associated with artificial menopause     Urinary retention     Visual impairment     WEAR GLASSES       Social History     Socioeconomic History    Marital status:     Number of children: 1    Years of education: BSN   Tobacco Use    Smoking status: Never     Passive exposure: Never    Smokeless tobacco: Never    Tobacco comments:     caffeine use   Vaping Use    Vaping status: Never Used   Substance and Sexual Activity    Alcohol use: Never    Drug use: Never    Sexual activity: Yes     Partners: Male     Birth control/protection:  "Post-menopausal       Family History   Problem Relation Age of Onset    Sick sinus syndrome Mother     Heart failure Mother     Pernicious anemia Mother     Hepatitis Mother         Hep C    Other Mother         Peripheral neuropathy    Colon polyps Mother     Diverticulitis Mother     Early death Mother         natural causes    Heart disease Mother         cad    Hyperlipidemia Mother     Hypertension Mother     Sick sinus syndrome Father         pacemaker    Arthritis Father     Cancer Father         prostate    Heart disease Father         Parkinsons/prostate cancer/Aicd    Hyperlipidemia Father     Hypertension Father     Parkinsonism Father     No Known Problems Brother     Diabetes Maternal Grandmother     Stomach cancer Maternal Grandfather     Diabetes Paternal Grandmother     Colon cancer Neg Hx     Malig Hyperthermia Neg Hx        Review of Systems   Constitutional: Negative for fever and night sweats.   HENT:  Negative for ear pain and stridor.    Eyes:  Negative for discharge and visual halos.   Cardiovascular:  Negative for cyanosis.   Respiratory:  Negative for hemoptysis and sputum production.    Hematologic/Lymphatic: Negative for adenopathy.   Skin:  Negative for nail changes and unusual hair distribution.   Musculoskeletal:  Negative for gout and joint swelling.   Gastrointestinal:  Negative for bowel incontinence and flatus.   Genitourinary:  Negative for dysuria and flank pain.   Neurological:  Negative for seizures and tremors.   Psychiatric/Behavioral:  Negative for altered mental status. The patient is not nervous/anxious.             Objective:     Vitals:    03/29/24 1017   BP: 134/92   Pulse: 76   Weight: 87.5 kg (193 lb)   Height: 167.6 cm (66\")         Eyes:      General:         Right eye: No discharge.         Left eye: No discharge.   HENT:      Head: Normocephalic and atraumatic.   Neck:      Thyroid: No thyromegaly.      Vascular: No JVD.   Pulmonary:      Effort: Pulmonary effort " is normal.      Breath sounds: Normal breath sounds. No rales.   Cardiovascular:      Normal rate. Regular rhythm.      No gallop.    Edema:     Peripheral edema absent.   Abdominal:      General: Bowel sounds are normal.      Palpations: Abdomen is soft.      Tenderness: There is no abdominal tenderness.   Musculoskeletal: Normal range of motion.         General: No deformity. Skin:     General: Skin is warm and dry.      Findings: No erythema.   Neurological:      Mental Status: Alert and oriented to person, place, and time.      Motor: Normal muscle tone.   Psychiatric:         Behavior: Behavior normal.         Thought Content: Thought content normal.           ECG 12 Lead    Date/Time: 3/29/2024 10:45 AM  Performed by: Nav Ellison MD    Authorized by: Nav Ellison MD  Comparison: compared with previous ECG   Similar to previous ECG  Rhythm: sinus rhythm          Lab Review:       Assessment:          Diagnosis Plan   1. Bradycardia, sinus               Plan:     I think we should do a monitor. She worries about her low heart rates. I bet she is ok.     She has no structural heart disease. By echo or nuclear     I reassured her about low heart rates

## 2024-04-04 ENCOUNTER — TELEPHONE (OUTPATIENT)
Age: 59
End: 2024-04-04
Payer: COMMERCIAL

## 2024-04-04 NOTE — TELEPHONE ENCOUNTER
----- Message from Nav Ellison MD sent at 4/4/2024  1:19 PM EDT -----  Can you let her know the monitor showed no arrhythmias at all. Even when she pushed the button the rhythm was perfect

## 2024-04-10 RX ORDER — PANTOPRAZOLE SODIUM 40 MG/1
40 TABLET, DELAYED RELEASE ORAL DAILY
Qty: 30 TABLET | Refills: 5 | Status: SHIPPED | OUTPATIENT
Start: 2024-04-10

## 2024-07-11 ENCOUNTER — OFFICE VISIT (OUTPATIENT)
Dept: INTERNAL MEDICINE | Facility: CLINIC | Age: 59
End: 2024-07-11
Payer: COMMERCIAL

## 2024-07-11 VITALS
SYSTOLIC BLOOD PRESSURE: 116 MMHG | WEIGHT: 194 LBS | HEART RATE: 60 BPM | BODY MASS INDEX: 31.18 KG/M2 | TEMPERATURE: 98.3 F | HEIGHT: 66 IN | DIASTOLIC BLOOD PRESSURE: 74 MMHG | OXYGEN SATURATION: 98 %

## 2024-07-11 DIAGNOSIS — R53.81 MALAISE AND FATIGUE: Primary | ICD-10-CM

## 2024-07-11 DIAGNOSIS — R53.83 MALAISE AND FATIGUE: Primary | ICD-10-CM

## 2024-07-11 DIAGNOSIS — R09.81 SINUS CONGESTION: ICD-10-CM

## 2024-07-11 DIAGNOSIS — U07.1 COVID: ICD-10-CM

## 2024-07-11 DIAGNOSIS — R63.0 DECREASED APPETITE: ICD-10-CM

## 2024-07-11 LAB
EXPIRATION DATE: ABNORMAL
FLUAV AG UPPER RESP QL IA.RAPID: NOT DETECTED
FLUBV AG UPPER RESP QL IA.RAPID: NOT DETECTED
INTERNAL CONTROL: ABNORMAL
Lab: ABNORMAL
SARS-COV-2 AG UPPER RESP QL IA.RAPID: DETECTED

## 2024-07-11 PROCEDURE — 87428 SARSCOV & INF VIR A&B AG IA: CPT

## 2024-07-11 PROCEDURE — 99213 OFFICE O/P EST LOW 20 MIN: CPT

## 2024-07-11 NOTE — PROGRESS NOTES
"Chief Complaint  Nasal Congestion, Chills, and Fever    Subjective        Imani Mazariegos presents to [facility]  History of Present Illness  59-year-old female presenting with upper respiratory symptoms, body aches and congested cough.  Had a UTI over 4 July and was prescribed Macrobid.  Completed the antibiotic. Started having 101 fever last night.  Has been sweating all day and had bodyaches.  States she has head congestion.  Has decreased appetite and no sense of taste or smell.  Has been treating with Flonase and Tylenol.  States that her  was sick over 4 July as well.  Also went to a social gathering at her [employer] where other seem to be sick.  Denies shortness of breath, wheezing, chest pain.  Chills  Associated symptoms include chills and a fever.   Fever         Objective   Vital Signs:  /74 (BP Location: Right arm, Patient Position: Sitting, Cuff Size: Large Adult)   Pulse 60   Temp 98.3 °F (36.8 °C)   Ht 167.6 cm (66\")   Wt 88 kg (194 lb)   SpO2 98%   BMI 31.31 kg/m²   Estimated body mass index is 31.31 kg/m² as calculated from the following:    Height as of this encounter: 167.6 cm (66\").    Weight as of this encounter: 88 kg (194 lb).               Physical Exam  Vitals reviewed.   Constitutional:       Appearance: Normal appearance.   HENT:      Head: Normocephalic.      Right Ear: Tympanic membrane normal.      Left Ear: Tympanic membrane normal.      Nose: Congestion present.   Cardiovascular:      Rate and Rhythm: Normal rate and regular rhythm.      Pulses: Normal pulses.      Heart sounds: Normal heart sounds.   Pulmonary:      Effort: Pulmonary effort is normal.      Breath sounds: Normal breath sounds.   Musculoskeletal:         General: Normal range of motion.      Cervical back: Normal range of motion.   Skin:     General: Skin is warm and dry.      Capillary Refill: Capillary refill takes less than 2 seconds.   Neurological:      General: No " focal deficit present.      Mental Status: She is alert and oriented to person, place, and time.   Psychiatric:         Mood and Affect: Mood normal.         Behavior: Behavior normal.         Thought Content: Thought content normal.         Judgment: Judgment normal.        Result Review :      Common labs          8/15/2023    16:05 9/28/2023    16:40 3/1/2024    09:53   Common Labs   Glucose 85  100  103    BUN 17  16  20    Creatinine 0.85  0.84  0.84    Sodium 141  141  143    Potassium 4.4  4.3  5.2    Chloride 105  105  106    Calcium 9.3  9.5  9.5    Total Protein   6.7    Albumin  4.6  4.4    Total Bilirubin  0.2  0.4    Alkaline Phosphatase  120  100    AST (SGOT)  30  26    ALT (SGPT)  36  28    WBC 5.47  6.25  4.67    Hemoglobin 14.8  14.5  15.1    Hematocrit 43.5  43.0  44.8    Platelets 240  267  288    Total Cholesterol   193    Triglycerides   91    HDL Cholesterol   65    LDL Cholesterol    112          Current Outpatient Medications on File Prior to Visit   Medication Sig Dispense Refill    pantoprazole (PROTONIX) 40 MG EC tablet TAKE 1 TABLET BY MOUTH DAILY 30 tablet 5    VITAMIN D PO Take  by mouth Daily.       No current facility-administered medications on file prior to visit.                Assessment and Plan     Diagnoses and all orders for this visit:    1. Malaise and fatigue (Primary)  -     POCT SARS-CoV-2 Antigen MARIUM + Flu    2. Sinus congestion  -     POCT SARS-CoV-2 Antigen MARIUM + Flu    3. Decreased appetite  -     POCT SARS-CoV-2 Antigen MARIUM + Flu    4. COVID        Patient Instructions   Manage symptoms with OTC medications. Stay hydrated with water. Encourage management. Follow-up as needed.            Follow Up     No follow-ups on file.  Patient was given instructions and counseling regarding her condition or for health maintenance advice. Please see specific information pulled into the AVS if appropriate.

## 2024-07-11 NOTE — PATIENT INSTRUCTIONS
Manage symptoms with OTC medications. Stay hydrated with water. Encourage management. Follow-up as needed.

## 2024-07-12 ENCOUNTER — PATIENT MESSAGE (OUTPATIENT)
Dept: INTERNAL MEDICINE | Facility: CLINIC | Age: 59
End: 2024-07-12
Payer: COMMERCIAL

## 2024-07-12 DIAGNOSIS — R42 DIZZINESS: Primary | ICD-10-CM

## 2024-07-12 DIAGNOSIS — H92.09 OTALGIA, UNSPECIFIED LATERALITY: ICD-10-CM

## 2024-07-12 RX ORDER — MECLIZINE HYDROCHLORIDE 25 MG/1
25 TABLET ORAL 3 TIMES DAILY PRN
Qty: 12 TABLET | Refills: 0 | Status: SHIPPED | OUTPATIENT
Start: 2024-07-12

## 2024-07-12 RX ORDER — TRAMADOL HYDROCHLORIDE 50 MG/1
50 TABLET ORAL EVERY 6 HOURS PRN
Qty: 12 TABLET | Refills: 0 | Status: SHIPPED | OUTPATIENT
Start: 2024-07-12

## 2024-07-12 NOTE — TELEPHONE ENCOUNTER
From: Imani Mazariegos  To: Jim Amos  Sent: 7/12/2024 9:27 AM EDT  Subject: Ear ache    Jim,   I was in there yesterday and dx with Covid. I woke up this morning with an earache in left ear. Dizziness is worse today also.   I did not want to go through weekend hurting.   Can you call me in something for this?

## 2024-07-24 ENCOUNTER — TELEPHONE (OUTPATIENT)
Dept: CARDIOLOGY | Facility: CLINIC | Age: 59
End: 2024-07-24

## 2024-07-24 NOTE — TELEPHONE ENCOUNTER
Dr. Maureen Waterhouse's office called asking if you could contact her on her cell to discuss this pt....Tina Dr. Waterhouse   CB#  [phone]

## 2024-08-08 RX ORDER — PANTOPRAZOLE SODIUM 40 MG/1
40 TABLET, DELAYED RELEASE ORAL DAILY
Qty: 90 TABLET | Refills: 1 | OUTPATIENT
Start: 2024-08-08

## 2024-08-09 ENCOUNTER — PATIENT MESSAGE (OUTPATIENT)
Dept: INTERNAL MEDICINE | Facility: CLINIC | Age: 59
End: 2024-08-09
Payer: COMMERCIAL

## 2024-08-09 DIAGNOSIS — K21.9 GASTROESOPHAGEAL REFLUX DISEASE, UNSPECIFIED WHETHER ESOPHAGITIS PRESENT: Primary | Chronic | ICD-10-CM

## 2024-08-09 DIAGNOSIS — K21.9 GASTROESOPHAGEAL REFLUX DISEASE, UNSPECIFIED WHETHER ESOPHAGITIS PRESENT: Chronic | ICD-10-CM

## 2024-08-09 RX ORDER — PANTOPRAZOLE SODIUM 40 MG/1
40 TABLET, DELAYED RELEASE ORAL DAILY
Qty: 30 TABLET | Refills: 5 | OUTPATIENT
Start: 2024-08-09

## 2024-08-09 RX ORDER — PANTOPRAZOLE SODIUM 40 MG/1
40 TABLET, DELAYED RELEASE ORAL DAILY
Qty: 90 TABLET | Refills: 1 | OUTPATIENT
Start: 2024-08-09

## 2024-08-09 RX ORDER — PANTOPRAZOLE SODIUM 40 MG/1
40 TABLET, DELAYED RELEASE ORAL DAILY
Qty: 90 TABLET | Refills: 1 | Status: SHIPPED | OUTPATIENT
Start: 2024-08-09

## 2024-08-09 NOTE — TELEPHONE ENCOUNTER
Rx Refill Note  Requested Prescriptions     Pending Prescriptions Disp Refills    pantoprazole (PROTONIX) 40 MG EC tablet 90 tablet 1     Sig: Take 1 tablet by mouth Daily.      Last office visit with prescribing clinician: 7/11/2024  Next office visit with prescribing clinician: 9/3/2024         Malissa De La Cruz MA  08/09/24, 14:19 EDT

## 2024-09-11 ENCOUNTER — OFFICE VISIT (OUTPATIENT)
Dept: INTERNAL MEDICINE | Facility: CLINIC | Age: 59
End: 2024-09-11
Payer: COMMERCIAL

## 2024-09-11 VITALS
DIASTOLIC BLOOD PRESSURE: 82 MMHG | OXYGEN SATURATION: 99 % | BODY MASS INDEX: 31.15 KG/M2 | WEIGHT: 193 LBS | SYSTOLIC BLOOD PRESSURE: 136 MMHG | HEART RATE: 68 BPM

## 2024-09-11 DIAGNOSIS — K21.9 GASTROESOPHAGEAL REFLUX DISEASE, UNSPECIFIED WHETHER ESOPHAGITIS PRESENT: ICD-10-CM

## 2024-09-11 DIAGNOSIS — E78.5 HYPERLIPIDEMIA, UNSPECIFIED HYPERLIPIDEMIA TYPE: ICD-10-CM

## 2024-09-11 DIAGNOSIS — E78.5 HYPERLIPIDEMIA, UNSPECIFIED HYPERLIPIDEMIA TYPE: Primary | ICD-10-CM

## 2024-09-11 PROCEDURE — 99213 OFFICE O/P EST LOW 20 MIN: CPT

## 2024-09-11 RX ORDER — CHLORAL HYDRATE 500 MG
CAPSULE ORAL
COMMUNITY
Start: 2024-02-05

## 2024-09-11 NOTE — PATIENT INSTRUCTIONS
Continue medications as prescribed. Limit salt intake. Stay hydrated with water. Stay active. Monitor blood pressure at home. Labs in the future. Follow-up in 6 months for annual and fasting labs.

## 2024-09-12 LAB
ALBUMIN SERPL-MCNC: 4.4 G/DL (ref 3.5–5.2)
ALBUMIN/GLOB SERPL: 1.9 G/DL
ALP SERPL-CCNC: 93 U/L (ref 39–117)
ALT SERPL-CCNC: 25 U/L (ref 1–33)
AST SERPL-CCNC: 23 U/L (ref 1–32)
BILIRUB SERPL-MCNC: 0.5 MG/DL (ref 0–1.2)
BUN SERPL-MCNC: 24 MG/DL (ref 8–23)
BUN/CREAT SERPL: 30.4 (ref 7–25)
CALCIUM SERPL-MCNC: 9.2 MG/DL (ref 8.6–10.5)
CHLORIDE SERPL-SCNC: 108 MMOL/L (ref 98–107)
CHOLEST SERPL-MCNC: 215 MG/DL (ref 0–200)
CHOLEST/HDLC SERPL: 3.47 {RATIO}
CO2 SERPL-SCNC: 25.8 MMOL/L (ref 22–29)
CREAT SERPL-MCNC: 0.79 MG/DL (ref 0.57–1)
EGFRCR SERPLBLD CKD-EPI 2021: 85.2 ML/MIN/1.73
GLOBULIN SER CALC-MCNC: 2.3 GM/DL
GLUCOSE SERPL-MCNC: 96 MG/DL (ref 65–99)
HDLC SERPL-MCNC: 62 MG/DL (ref 40–60)
LDLC SERPL CALC-MCNC: 137 MG/DL (ref 0–100)
POTASSIUM SERPL-SCNC: 4.8 MMOL/L (ref 3.5–5.2)
PROT SERPL-MCNC: 6.7 G/DL (ref 6–8.5)
SODIUM SERPL-SCNC: 142 MMOL/L (ref 136–145)
TRIGL SERPL-MCNC: 92 MG/DL (ref 0–150)
VLDLC SERPL CALC-MCNC: 16 MG/DL (ref 5–40)

## 2024-09-12 NOTE — PROGRESS NOTES
Metabolic panel is unremarkable.  No signs of liver injury or kidney injury.    LDL (bad cholesterol) is elevated 137.  Goal of 100 or lower.  Previous level of 112, 6 months ago.  Continue fish oil supplement daily.  Increase activity as tolerated.  Consider cholesterol-lowering medication in the future if cholesterol continues to increase.  Will recheck at next appointment.

## 2025-01-12 ENCOUNTER — HOSPITAL ENCOUNTER (OUTPATIENT)
Facility: HOSPITAL | Age: 60
Setting detail: OBSERVATION
Discharge: HOME | End: 2025-01-13
Attending: EMERGENCY MEDICINE | Admitting: EMERGENCY MEDICINE
Payer: COMMERCIAL

## 2025-01-12 ENCOUNTER — APPOINTMENT (OUTPATIENT)
Dept: GENERAL RADIOLOGY | Facility: HOSPITAL | Age: 60
End: 2025-01-12
Payer: COMMERCIAL

## 2025-01-12 ENCOUNTER — APPOINTMENT (OUTPATIENT)
Dept: CT IMAGING | Facility: HOSPITAL | Age: 60
End: 2025-01-12
Payer: COMMERCIAL

## 2025-01-12 DIAGNOSIS — R29.898 WEAKNESS OF LEFT LOWER EXTREMITY: ICD-10-CM

## 2025-01-12 DIAGNOSIS — R41.3 MEMORY DEFICIT: Primary | ICD-10-CM

## 2025-01-12 PROBLEM — R41.82 ALTERED MENTAL STATUS: Status: ACTIVE | Noted: 2025-01-12

## 2025-01-12 LAB
ABO GROUP BLD: NORMAL
ALBUMIN SERPL-MCNC: 4.2 G/DL (ref 3.5–5.2)
ALBUMIN/GLOB SERPL: 1.8 G/DL
ALP SERPL-CCNC: 98 U/L (ref 39–117)
ALT SERPL W P-5'-P-CCNC: 30 U/L (ref 1–33)
ANION GAP SERPL CALCULATED.3IONS-SCNC: 10 MMOL/L (ref 5–15)
APTT PPP: 26 SECONDS (ref 22.7–35.4)
AST SERPL-CCNC: 27 U/L (ref 1–32)
BACTERIA UR QL AUTO: ABNORMAL /HPF
BASOPHILS # BLD AUTO: 0.03 10*3/MM3 (ref 0–0.2)
BASOPHILS NFR BLD AUTO: 0.5 % (ref 0–1.5)
BILIRUB SERPL-MCNC: 0.2 MG/DL (ref 0–1.2)
BILIRUB UR QL STRIP: NEGATIVE
BLD GP AB SCN SERPL QL: NEGATIVE
BUN SERPL-MCNC: 20 MG/DL (ref 8–23)
BUN/CREAT SERPL: 24.7 (ref 7–25)
CALCIUM SPEC-SCNC: 8.9 MG/DL (ref 8.6–10.5)
CHLORIDE SERPL-SCNC: 107 MMOL/L (ref 98–107)
CLARITY UR: CLEAR
CO2 SERPL-SCNC: 22 MMOL/L (ref 22–29)
COLOR UR: YELLOW
CREAT SERPL-MCNC: 0.81 MG/DL (ref 0.57–1)
DEPRECATED RDW RBC AUTO: 41.8 FL (ref 37–54)
EGFRCR SERPLBLD CKD-EPI 2021: 82.7 ML/MIN/1.73
EOSINOPHIL # BLD AUTO: 0.1 10*3/MM3 (ref 0–0.4)
EOSINOPHIL NFR BLD AUTO: 1.6 % (ref 0.3–6.2)
ERYTHROCYTE [DISTWIDTH] IN BLOOD BY AUTOMATED COUNT: 12.3 % (ref 12.3–15.4)
GEN 5 1HR TROPONIN T REFLEX: <6 NG/L
GLOBULIN UR ELPH-MCNC: 2.4 GM/DL
GLUCOSE BLDC GLUCOMTR-MCNC: 96 MG/DL (ref 70–130)
GLUCOSE SERPL-MCNC: 95 MG/DL (ref 65–99)
GLUCOSE UR STRIP-MCNC: NEGATIVE MG/DL
HCT VFR BLD AUTO: 43.1 % (ref 34–46.6)
HGB BLD-MCNC: 14.5 G/DL (ref 12–15.9)
HGB UR QL STRIP.AUTO: NEGATIVE
HOLD SPECIMEN: NORMAL
HOLD SPECIMEN: NORMAL
HYALINE CASTS UR QL AUTO: ABNORMAL /LPF
IMM GRANULOCYTES # BLD AUTO: 0.01 10*3/MM3 (ref 0–0.05)
IMM GRANULOCYTES NFR BLD AUTO: 0.2 % (ref 0–0.5)
INR PPP: 0.94 (ref 0.9–1.1)
KETONES UR QL STRIP: NEGATIVE
LEUKOCYTE ESTERASE UR QL STRIP.AUTO: ABNORMAL
LYMPHOCYTES # BLD AUTO: 2.4 10*3/MM3 (ref 0.7–3.1)
LYMPHOCYTES NFR BLD AUTO: 39.2 % (ref 19.6–45.3)
MCH RBC QN AUTO: 31.3 PG (ref 26.6–33)
MCHC RBC AUTO-ENTMCNC: 33.6 G/DL (ref 31.5–35.7)
MCV RBC AUTO: 93.1 FL (ref 79–97)
MONOCYTES # BLD AUTO: 0.85 10*3/MM3 (ref 0.1–0.9)
MONOCYTES NFR BLD AUTO: 13.9 % (ref 5–12)
NEUTROPHILS NFR BLD AUTO: 2.74 10*3/MM3 (ref 1.7–7)
NEUTROPHILS NFR BLD AUTO: 44.6 % (ref 42.7–76)
NITRITE UR QL STRIP: NEGATIVE
NRBC BLD AUTO-RTO: 0 /100 WBC (ref 0–0.2)
PH UR STRIP.AUTO: 7 [PH] (ref 5–8)
PLATELET # BLD AUTO: 269 10*3/MM3 (ref 140–450)
PMV BLD AUTO: 9.8 FL (ref 6–12)
POTASSIUM SERPL-SCNC: 4.1 MMOL/L (ref 3.5–5.2)
PROT SERPL-MCNC: 6.6 G/DL (ref 6–8.5)
PROT UR QL STRIP: NEGATIVE
PROTHROMBIN TIME: 12.8 SECONDS (ref 11.7–14.2)
RBC # BLD AUTO: 4.63 10*6/MM3 (ref 3.77–5.28)
RBC # UR STRIP: ABNORMAL /HPF
REF LAB TEST METHOD: ABNORMAL
RH BLD: NEGATIVE
SODIUM SERPL-SCNC: 139 MMOL/L (ref 136–145)
SP GR UR STRIP: >1.03 (ref 1–1.03)
SQUAMOUS #/AREA URNS HPF: ABNORMAL /HPF
T&S EXPIRATION DATE: NORMAL
TROPONIN T NUMERIC DELTA: NORMAL
TROPONIN T SERPL HS-MCNC: <6 NG/L
UROBILINOGEN UR QL STRIP: ABNORMAL
WBC # UR STRIP: ABNORMAL /HPF
WBC NRBC COR # BLD AUTO: 6.13 10*3/MM3 (ref 3.4–10.8)
WHOLE BLOOD HOLD COAG: NORMAL
WHOLE BLOOD HOLD SPECIMEN: NORMAL

## 2025-01-12 PROCEDURE — 85610 PROTHROMBIN TIME: CPT | Performed by: EMERGENCY MEDICINE

## 2025-01-12 PROCEDURE — 86850 RBC ANTIBODY SCREEN: CPT | Performed by: EMERGENCY MEDICINE

## 2025-01-12 PROCEDURE — 71045 X-RAY EXAM CHEST 1 VIEW: CPT

## 2025-01-12 PROCEDURE — 70498 CT ANGIOGRAPHY NECK: CPT

## 2025-01-12 PROCEDURE — 85025 COMPLETE CBC W/AUTO DIFF WBC: CPT | Performed by: EMERGENCY MEDICINE

## 2025-01-12 PROCEDURE — 0042T HC CT CEREBRAL PERFUSION W/WO CONTRAST: CPT

## 2025-01-12 PROCEDURE — 70496 CT ANGIOGRAPHY HEAD: CPT

## 2025-01-12 PROCEDURE — G0378 HOSPITAL OBSERVATION PER HR: HCPCS

## 2025-01-12 PROCEDURE — 81001 URINALYSIS AUTO W/SCOPE: CPT | Performed by: NURSE PRACTITIONER

## 2025-01-12 PROCEDURE — 99291 CRITICAL CARE FIRST HOUR: CPT

## 2025-01-12 PROCEDURE — 80053 COMPREHEN METABOLIC PANEL: CPT | Performed by: EMERGENCY MEDICINE

## 2025-01-12 PROCEDURE — 82948 REAGENT STRIP/BLOOD GLUCOSE: CPT

## 2025-01-12 PROCEDURE — 86900 BLOOD TYPING SEROLOGIC ABO: CPT | Performed by: EMERGENCY MEDICINE

## 2025-01-12 PROCEDURE — 25510000001 IOPAMIDOL PER 1 ML: Performed by: EMERGENCY MEDICINE

## 2025-01-12 PROCEDURE — 85730 THROMBOPLASTIN TIME PARTIAL: CPT | Performed by: EMERGENCY MEDICINE

## 2025-01-12 PROCEDURE — 36415 COLL VENOUS BLD VENIPUNCTURE: CPT

## 2025-01-12 PROCEDURE — 84484 ASSAY OF TROPONIN QUANT: CPT | Performed by: EMERGENCY MEDICINE

## 2025-01-12 PROCEDURE — 93005 ELECTROCARDIOGRAM TRACING: CPT | Performed by: EMERGENCY MEDICINE

## 2025-01-12 PROCEDURE — 86901 BLOOD TYPING SEROLOGIC RH(D): CPT | Performed by: EMERGENCY MEDICINE

## 2025-01-12 RX ORDER — SODIUM CHLORIDE 0.9 % (FLUSH) 0.9 %
10 SYRINGE (ML) INJECTION AS NEEDED
Status: DISCONTINUED | OUTPATIENT
Start: 2025-01-12 | End: 2025-01-13 | Stop reason: HOSPADM

## 2025-01-12 RX ORDER — ACETAMINOPHEN 500 MG
1000 TABLET ORAL ONCE
Status: COMPLETED | OUTPATIENT
Start: 2025-01-12 | End: 2025-01-12

## 2025-01-12 RX ORDER — IOPAMIDOL 755 MG/ML
150 INJECTION, SOLUTION INTRAVASCULAR
Status: COMPLETED | OUTPATIENT
Start: 2025-01-12 | End: 2025-01-12

## 2025-01-12 RX ADMIN — IOPAMIDOL 150 ML: 755 INJECTION, SOLUTION INTRAVENOUS at 20:30

## 2025-01-12 RX ADMIN — ACETAMINOPHEN 1000 MG: 500 TABLET, FILM COATED ORAL at 22:50

## 2025-01-13 ENCOUNTER — READMISSION MANAGEMENT (OUTPATIENT)
Dept: CALL CENTER | Facility: HOSPITAL | Age: 60
End: 2025-01-13
Payer: COMMERCIAL

## 2025-01-13 ENCOUNTER — APPOINTMENT (OUTPATIENT)
Dept: MRI IMAGING | Facility: HOSPITAL | Age: 60
End: 2025-01-13
Payer: COMMERCIAL

## 2025-01-13 ENCOUNTER — APPOINTMENT (OUTPATIENT)
Dept: NEUROLOGY | Facility: HOSPITAL | Age: 60
End: 2025-01-13
Payer: COMMERCIAL

## 2025-01-13 VITALS
DIASTOLIC BLOOD PRESSURE: 58 MMHG | RESPIRATION RATE: 16 BRPM | TEMPERATURE: 97.9 F | HEART RATE: 48 BPM | SYSTOLIC BLOOD PRESSURE: 106 MMHG | HEIGHT: 66 IN | OXYGEN SATURATION: 97 % | BODY MASS INDEX: 31.99 KG/M2 | WEIGHT: 199.08 LBS

## 2025-01-13 PROBLEM — R41.82 ALTERED MENTAL STATUS: Status: RESOLVED | Noted: 2025-01-12 | Resolved: 2025-01-13

## 2025-01-13 LAB
QT INTERVAL: 464 MS
QTC INTERVAL: 468 MS

## 2025-01-13 PROCEDURE — 95819 EEG AWAKE AND ASLEEP: CPT

## 2025-01-13 PROCEDURE — 99214 OFFICE O/P EST MOD 30 MIN: CPT | Performed by: STUDENT IN AN ORGANIZED HEALTH CARE EDUCATION/TRAINING PROGRAM

## 2025-01-13 PROCEDURE — 25510000002 GADOBENATE DIMEGLUMINE 529 MG/ML SOLUTION: Performed by: EMERGENCY MEDICINE

## 2025-01-13 PROCEDURE — 70553 MRI BRAIN STEM W/O & W/DYE: CPT

## 2025-01-13 PROCEDURE — 95819 EEG AWAKE AND ASLEEP: CPT | Performed by: PSYCHIATRY & NEUROLOGY

## 2025-01-13 PROCEDURE — G0378 HOSPITAL OBSERVATION PER HR: HCPCS

## 2025-01-13 PROCEDURE — A9577 INJ MULTIHANCE: HCPCS | Performed by: EMERGENCY MEDICINE

## 2025-01-13 PROCEDURE — 25010000002 DIAZEPAM PER 5 MG: Performed by: NURSE PRACTITIONER

## 2025-01-13 RX ORDER — ONDANSETRON 2 MG/ML
4 INJECTION INTRAMUSCULAR; INTRAVENOUS EVERY 6 HOURS PRN
Status: DISCONTINUED | OUTPATIENT
Start: 2025-01-13 | End: 2025-01-13 | Stop reason: HOSPADM

## 2025-01-13 RX ORDER — ACETAMINOPHEN 500 MG
1000 TABLET ORAL ONCE
Status: COMPLETED | OUTPATIENT
Start: 2025-01-13 | End: 2025-01-13

## 2025-01-13 RX ORDER — PANTOPRAZOLE SODIUM 40 MG/1
40 TABLET, DELAYED RELEASE ORAL DAILY
Status: DISCONTINUED | OUTPATIENT
Start: 2025-01-13 | End: 2025-01-13 | Stop reason: HOSPADM

## 2025-01-13 RX ORDER — SODIUM CHLORIDE 0.9 % (FLUSH) 0.9 %
10 SYRINGE (ML) INJECTION AS NEEDED
Status: DISCONTINUED | OUTPATIENT
Start: 2025-01-13 | End: 2025-01-13 | Stop reason: HOSPADM

## 2025-01-13 RX ORDER — RIBOFLAVIN (VITAMIN B2) 100 MG
400 TABLET ORAL DAILY
Qty: 120 TABLET | Refills: 1 | Status: SHIPPED | OUTPATIENT
Start: 2025-01-13 | End: 2025-02-12

## 2025-01-13 RX ORDER — ASPIRIN 81 MG/1
81 TABLET ORAL DAILY
Qty: 30 TABLET | Refills: 2 | Status: SHIPPED | OUTPATIENT
Start: 2025-01-13 | End: 2025-04-13

## 2025-01-13 RX ORDER — SODIUM CHLORIDE 9 MG/ML
40 INJECTION, SOLUTION INTRAVENOUS AS NEEDED
Status: DISCONTINUED | OUTPATIENT
Start: 2025-01-13 | End: 2025-01-13 | Stop reason: HOSPADM

## 2025-01-13 RX ORDER — MAGNESIUM OXIDE 400 MG/1
400 TABLET ORAL DAILY
Qty: 60 TABLET | Refills: 1 | Status: SHIPPED | OUTPATIENT
Start: 2025-01-13

## 2025-01-13 RX ORDER — ONDANSETRON 4 MG/1
4 TABLET, ORALLY DISINTEGRATING ORAL EVERY 6 HOURS PRN
Status: DISCONTINUED | OUTPATIENT
Start: 2025-01-13 | End: 2025-01-13 | Stop reason: HOSPADM

## 2025-01-13 RX ORDER — SODIUM CHLORIDE 0.9 % (FLUSH) 0.9 %
10 SYRINGE (ML) INJECTION EVERY 12 HOURS SCHEDULED
Status: DISCONTINUED | OUTPATIENT
Start: 2025-01-13 | End: 2025-01-13 | Stop reason: HOSPADM

## 2025-01-13 RX ORDER — DIAZEPAM 10 MG/2ML
2.5 INJECTION, SOLUTION INTRAMUSCULAR; INTRAVENOUS ONCE
Status: COMPLETED | OUTPATIENT
Start: 2025-01-13 | End: 2025-01-13

## 2025-01-13 RX ADMIN — Medication 10 ML: at 07:07

## 2025-01-13 RX ADMIN — Medication 10 ML: at 08:38

## 2025-01-13 RX ADMIN — DIAZEPAM 2.5 MG: 5 INJECTION INTRAMUSCULAR; INTRAVENOUS at 07:07

## 2025-01-13 RX ADMIN — PANTOPRAZOLE SODIUM 40 MG: 40 TABLET, DELAYED RELEASE ORAL at 08:38

## 2025-01-13 RX ADMIN — GADOBENATE DIMEGLUMINE 20 ML: 529 INJECTION, SOLUTION INTRAVENOUS at 08:02

## 2025-01-13 RX ADMIN — ACETAMINOPHEN 1000 MG: 500 TABLET, FILM COATED ORAL at 10:01

## 2025-01-13 NOTE — PLAN OF CARE
Goal Outcome Evaluation:         Patient is admitted to ED Observation for evaluation of confusion with short term memory loss. Her VSS, HR is Sinus Son with rates down into the 40's. She is pleasant and can answer orientation questions intermittently.  NIH on admission is 2 r/t getting her age wrong and having to ask how old she is. When testing vision she only saw 1 finger instead of the two being held up in her LL visual quadrant. Daughter was initially at bedside and states she will be going home tonight. Neuro stroke is consulted and she had MRI ordered for AM.

## 2025-01-13 NOTE — PLAN OF CARE
Goal Outcome Evaluation:              Outcome Evaluation: patient discharging observation at this time via private vehicle with all her belongings, discharge summary provided and education inlcuded,aware to follow up with primay care and neurology as indicated and to pickup  new medication at the pharmacy

## 2025-01-13 NOTE — PROGRESS NOTES
DAXA BOYD Attestation Note     I supervised care provided by the midlevel provider. We have discussed this patient's history, physical exam, and treatment plan. I have reviewed the midlevel provider's note and I agree with the midlevel provider's findings and plan of care.   SHARED VISIT: This visit was performed by BOTH a physician and an APC. The substantive portion of the medical decision making was performed by this attesting physician who made or approved the management plan and takes responsibility for patient management. All studies in the APC note (if performed) were independently interpreted by me.   I have personally had a face to face encounter with the patient.   My personal findings are documented below:      Subjective  Pt is a 59 y.o. female admitted from Emergency Department for evaluation and treatment of episode of confusion and not acting right.  Patient is generally quite healthy but has been under increased stress and is due to start a new job today.  Patient and family reports she is back to baseline this morning.    Physical Exam  GENERAL: Alert and in NAD, Vitals reviewed-blood pressure 125/64, pulse 47.  Temperature and O2 sats are normal  HENT: nares patent  EYES: no scleral icterus  CV: regular rhythm, regular rate-no murmur  RESPIRATORY: normal effort, clear to auscultation bilaterally  ABDOMEN: soft  MUSCULOSKELETAL: no deformity  NEURO: Strength sensation and coordination are grossly intact.  Speech and mentation are unremarkable.  Patient answer questions follows commands without any difficulties.  SKIN: warm, dry    Assessment/Plan  I discussed tx and evaluation of this patient with CASSIE Damon.  I did review current hospital workup including EKG and labs that are unremarkable.  CT angiogram of the head and neck did not show any significant vascular occlusions.  MRI of brain recently completed also is benign without evidence of stroke tumor or hemorrhage.  Suspect most likely  atypical migraine, less likely TIA.  Awaiting neurology evaluation recommendations.

## 2025-01-13 NOTE — OUTREACH NOTE
Prep Survey      Flowsheet Row Responses   [facility] patient discharged from? [facility]   Is LACE score < 7 ? Yes   Eligibility [facility]   Date of Admission 01/12/25   Date of Discharge 01/13/25   Discharge Disposition Home or Self Care   Discharge diagnosis Altered mental status  Disorientation and repetitive questioning   Does the patient have one of the following disease processes/diagnoses(primary or secondary)? Other   Does the patient have Home health ordered? No   Is there a DME ordered? No   Prep survey completed? Yes            JUDY PEREZ - Registered Nurse

## 2025-01-13 NOTE — DISCHARGE INSTRUCTIONS
Return to the emergency department for symptoms recurrence or exacerbation  Follow-up with neurology in 2 weeks  Your EEG is unremarkable and your MRI shows no evidence of acute intracranial findings

## 2025-01-13 NOTE — DISCHARGE SUMMARY
ED OBSERVATION PROGRESS/DISCHARGE SUMMARY    Date of Admission: 1/12/2025   LOS: 0 days   PCP: Jim Amos APRN          Subjective     Hospital Outcome:   59-year-old female was seen and examined at bedside following admission to the observation unit secondary to transient global amnesia.  Laboratory evaluation relatively unremarkable.  CTA head and neck negative acute.  Patient currently alert and oriented x 4 and denies unilateral weakness, numbness, tingling, dysarthria/aphasia or lightheadedness.  MRI brain with and without shows no evidence of acute intracranial findings.  Neurology evaluated patient has started her on daily baby aspirin, Repatha (and magnesium oxide.  Also an EEG was ordered and shows no evidence of epileptogenic activity/seizure activity or focal slowing.  Patient will be discharged home and to follow-up with neurology in 2 weeks.    ROS:  General: no fevers, chills  Respiratory: no cough, dyspnea  Cardiovascular: no chest pain, palpitations  Abdomen: No abdominal pain, nausea, vomiting, or diarrhea  Neurologic: No focal weakness    Objective   Physical Exam:  I have reviewed the vital signs.  Temp:  [97.7 °F (36.5 °C)-98 °F (36.7 °C)] 98 °F (36.7 °C)  Heart Rate:  [46-81] 46  Resp:  [14-16] 16  BP: ()/(63-87) 94/63  General Appearance:    Alert, cooperative, no distress  Head:    Normocephalic, atraumatic  Eyes:    Sclerae anicteric  Neck:   Supple, no mass  Lungs: Clear to auscultation bilaterally, respirations unlabored  Heart: Regular rate and rhythm, S1 and S2 normal, no murmur, rub or gallop  Abdomen:  Soft, nontender, bowel sounds active, nondistended  Extremities: No clubbing, cyanosis, or edema to lower extremities  Pulses:  2+ and symmetric in distal lower extremities  Skin: No rashes   Neurologic: Oriented x3, Normal strength to extremities    Results Review:    I have reviewed the labs, radiology results and diagnostic studies.    Results from last 7 days   Lab Units  01/12/25 2019   WBC 10*3/mm3 6.13   HEMOGLOBIN g/dL 14.5   HEMATOCRIT % 43.1   PLATELETS 10*3/mm3 269     Results from last 7 days   Lab Units 01/12/25 2019   SODIUM mmol/L 139   POTASSIUM mmol/L 4.1   CHLORIDE mmol/L 107   CO2 mmol/L 22.0   BUN mg/dL 20   CREATININE mg/dL 0.81   CALCIUM mg/dL 8.9   BILIRUBIN mg/dL 0.2   ALK PHOS U/L 98   ALT (SGPT) U/L 30   AST (SGOT) U/L 27   GLUCOSE mg/dL 95     Imaging Results (Last 24 Hours)       Procedure Component Value Units Date/Time    MRI Brain With & Without Contrast [[id number]] Resulted: 01/13/25 0738     Updated: 01/13/25 0738    XR Chest 1 View [[id number]] Collected: 01/12/25 2201     Updated: 01/12/25 2231    Narrative:      CHEST SINGLE VIEW     HISTORY: 59 years of age, Female.  Acute Stroke Protocol (onset < 12  hrs)     COMPARISON: AP chest 09/28/2023.     FINDINGS: Heart and mediastinal structures are within normal limits.  Lungs appear clear and there is no evidence for pulmonary edema, pleural  effusion or infiltrate. Cardiac monitoring leads are present.       Impression:      No evidence for active disease in the chest.        This report was finalized on 1/12/2025 10:28 PM by Brandon Calvo M.D  on Workstation: [id number]       CT Angiogram Head w AI Analysis of LVO [[id number]] Collected: 01/12/25 2101     Updated: 01/12/25 2105    Addenda:        ADDENDUM:  01 12 25 21:04 Call Doctor Regarding Stroke, called Neurologist, STROKE @   [phone] (Pager) Regarding Stroke. A telephone report of the   findings was given to and confirmed by  Neuro Dr. Chauhan  on 01 12 21:04   (-05:00)      Signed: 01/12/25 2100 by Bubba Raymond MD    Narrative:      CR  Patient: LARRY LAZO  Time Out: 21:00  Exam(s): CTA HEAD IV Amt: 150ml isovue 370    EXAM:    CT Angiography Head With Intravenous Contrast    CLINICAL HISTORY:     Reason for exam: Stroke, follow up. Neuro deficit, acute, stroke   suspected. Acute Stroke.    TECHNIQUE:  AI analysis of LVO was  utilized    Axial computed tomographic angiography images of the head with   intravenous contrast.  CTDI is 496.4 mGy and DLP is 7391 mGy-cm.  This CT   exam was performed according to the principle of ALARA (As Low As   Reasonably Achievable) by using one or more of the following dose   reduction techniques: automated exposure control, adjustment of the mA   and or kV according to patient size, and or use of iterative   reconstruction technique.    MIP reconstructed images were created and reviewed.    COMPARISON:    No relevant prior studies available.    FINDINGS:    Right internal carotid artery:  Intracranial segment is patent with no   significant stenosis.  No aneurysm.    Right anterior cerebral artery:  No occlusion or significant stenosis.    No aneurysm.    Right middle cerebral artery:  No occlusion or significant stenosis.    No aneurysm.    Right posterior cerebral artery:  No occlusion or significant stenosis.    No aneurysm.    Right vertebral artery:  Unremarkable as visualized.      Left internal carotid artery:  Intracranial segment is patent with no   significant stenosis.  No aneurysm.    Left anterior cerebral artery:  No occlusion or significant stenosis.    No aneurysm.    Left middle cerebral artery:  No occlusion or significant stenosis.  No   aneurysm.    Left posterior cerebral artery:  No occlusion or significant stenosis.    No aneurysm.    Left vertebral artery:  Unremarkable as visualized.      Basilar artery:  No occlusion or significant stenosis.  No aneurysm.    IMPRESSION:         Normal head CTA.      Impression:            Communications:     Call Doctor Stroke; Neurologist, STROKE @ [phone] (Pager)    Electronically signed by Bubba Raymond MD on 01-12-25 at 2100    CT Angiogram Neck [[id number]] Collected: 01/12/25 2101     Updated: 01/12/25 2105    Addenda:        ADDENDUM:  01 12 25 21:04 Call Doctor  Regarding Stroke. A telephone report of the   findings was given to  and confirmed by  Neuro Dr. Chauhan  on 01 12 21:04   (-05:00)      Signed: 01/12/25 2101 by Bubba Raymond MD    Narrative:      CR  Patient: LARRY LAZO  Time Out: 21:01  Exam(s): CTA NECK IV Amt: 150ml isovue 370    EXAM:    CT Angiography Neck With Intravenous Contrast    CLINICAL HISTORY:     Reason for exam: Stroke, follow up. Neuro deficit, acute, stroke   suspected.    TECHNIQUE:    Routine carotid CT angiography protocol was performed with intravenous   contrast.  NASCET criteria using the distal ICAs for comparison were used   for evaluation of stenoses.  CTDI is 496.4 mGy and DLP is 7391 mGy-cm.    This CT exam was performed according to the principle of ALARA (As Low As   Reasonably Achievable) by using one or more of the following dose   reduction techniques: automated exposure control, adjustment of the mA   and or kV according to patient size, and or use of iterative   reconstruction technique.    MIP reconstructed images were created and reviewed.    COMPARISON:    None.    FINDINGS:     VASCULATURE:    Right common carotid artery:  No occlusion or significant stenosis.  No   dissection.    Right internal carotid artery:  Extracranial segment is patent with no   occlusion or significant stenosis.  No dissection.    Right vertebral artery:  No occlusion or significant stenosis.  No   dissection.      Left common carotid artery:  No occlusion or significant stenosis.  No   dissection.    Left internal carotid artery:  Extracranial segment is patent with no   occlusion or significant stenosis.  No dissection.    Left vertebral artery:  No occlusion or significant stenosis.  No   dissection.     NECK:    Bones joints:  Unremarkable.  No acute fracture.    Soft tissues:  Unremarkable.    Lung apices:  Clear.     CAROTID STENOSIS REFERENCE USING NASCET CRITERIA:    % ICA stenosis = (1 - narrowest ICA diameter diameter of distal   cervical ICA) x 100.    Mild - <50% stenosis.    Moderate - 50-69%  stenosis.    Severe - 70-94% stenosis.    Near occlusion - 95-99% stenosis.    Occluded - 100% stenosis.    IMPRESSION:         Negative CTA neck.      Impression:            Communications:     Call Doctor Stroke    Electronically signed by Bubba Raymond MD on 01-12-25 at 2101    CT CEREBRAL PERFUSION WITH & WITHOUT CONTRAST [[id number]] Collected: 01/12/25 2102     Updated: 01/12/25 2105    Addenda:        ADDENDUM:  01 12 25 21:04 Call Doctor  Regarding Stroke. A telephone report of the   findings was given to and confirmed by  Neuro Dr. Chauhan  on 01 12 21:04   (-05:00)      Signed: 01/12/25 2102 by Bubba Raymond MD    Narrative:      CR  Patient: LARRY LAZO  Time Out: 21:02  Exam(s): CT PERFUSION     EXAM:    CT Brain Perfusion With Intravenous Contrast    CLINICAL HISTORY:     Reason for exam: Neuro deficit, acute, stroke suspected. Neuro deficit,   acute, stroke suspected.    TECHNIQUE:    Routine CT brain cerebral perfusion study was performed using IV   contrast.  Reformats and postprocessing were performed by the   technologist.  CTDI is 332.89 mGy and DLP is 3924.8 mGy-cm.  This CT exam   was performed according to the principle of ALARA (As Low As Reasonably   Achievable) by using one or more of the following dose reduction   techniques: automated exposure control, adjustment of the mA and or kV   according to patient size, and or use of iterative reconstruction   technique.    COMPARISON:    None.    FINDINGS:    Core infarct:  None.  rCBF > 30% throughout the brain.  No findings to   suggest acute core infarct.    Reversible ischemia:  None.  TMax < 6s throughout the brain.  No   findings to suggest reversible ischemia or acute core infarct.    Brain and extra-axial spaces:  No intra- or extra-axial hemorrhage.    IMPRESSION:         No CT brain perfusion evidence of acute infarct.      Impression:            Communications:     Call Doctor Stroke    Electronically signed by Bubba Raymond  MD on 01-12-25 at 2102            I have reviewed the medications.     Discharge Medications        ASK your doctor about these medications        Instructions Start Date   fish oil 1000 MG capsule capsule       pantoprazole 40 MG EC tablet  Commonly known as: PROTONIX   40 mg, Oral, Daily      VITAMIN D PO   Daily              ---------------------------------------------------------------------------------------------  Assessment & Plan   Assessment/Problem List    Altered mental status      Plan:  Altered mental status  Disorientation and repetitive questioning  Cardiac monitor  Labs unremarkable  Monocytes elevated  Chest x-ray unremarkable  CTA of head and neck reported as negative  MRI of brain with and without contrast negative acute  EEG negative  Neurology consulted by ED provider    Disposition: Home    Follow-up after Discharge: PCP    This note will serve as a discharge summary    CASSIE Dukes 01/13/25 08:24 EST    I have worn appropriate PPE during this patient encounter, sanitized my hands both with entering and exiting patient's room.      35 minutes has been spent by [facility] providers in the care of this patient while under observation status

## 2025-01-13 NOTE — ED NOTES
"Nursing report ED to floor  Imani Mazariegos  59 y.o.  female    HPI :  HPI  Stated Reason for Visit: sudden onset confusion with repeated questioning  History Obtained From: family    Chief Complaint  Chief Complaint   Patient presents with    Altered Mental Status     Sudden onset confusion with repetitive questioning       Admitting doctor:   Sav Carolina MD    Admitting diagnosis:   The primary encounter diagnosis was Memory deficit. A diagnosis of Weakness of left lower extremity was also pertinent to this visit.    Code status:   Current Code Status       Date Active Code Status Order ID Comments User Context       Not on file            Allergies:   Flagyl [metronidazole]    Isolation:   No active isolations    Intake and Output    Intake/Output Summary (Last 24 hours) at 1/12/2025 2258  Last data filed at 1/12/2025 2250  Gross per 24 hour   Intake --   Output 700 ml   Net -700 ml       Weight:       01/12/25 2020   Weight: 90.3 kg (199 lb 1.2 oz)       Most recent vitals:   Vitals:    01/12/25 2131 01/12/25 2142 01/12/25 2201 01/12/25 2249   BP: 152/85 146/87  127/71   BP Location:    Left arm   Patient Position:    Lying   Pulse: 52 61  53   Resp: 16 16  14   Temp:       TempSrc:       SpO2: 98% 96%  98%   Weight:       Height:   167.6 cm (66\")        Active LDAs/IV Access:   Lines, Drains & Airways       Active LDAs       Name Placement date Placement time Site Days    Peripheral IV 01/12/25 2018 Right Antecubital 01/12/25 2018  Antecubital  less than 1                    Labs (abnormal labs have a star):   Labs Reviewed   CBC WITH AUTO DIFFERENTIAL - Abnormal; Notable for the following components:       Result Value    Monocyte % 13.9 (*)     All other components within normal limits   PROTIME-INR - Normal   APTT - Normal   TROPONIN - Normal    Narrative:     High Sensitive Troponin T Reference Range:  <14.0 ng/L- Negative Female for AMI  <22.0 ng/L- Negative Male for AMI  >=14 - Abnormal Female " indicating possible myocardial injury.  >=22 - Abnormal Male indicating possible myocardial injury.   Clinicians would have to utilize clinical acumen, EKG, Troponin, and serial changes to determine if it is an Acute Myocardial Infarction or myocardial injury due to an underlying chronic condition.        POCT GLUCOSE FINGERSTICK - Normal   RAINBOW DRAW    Narrative:     The following orders were created for panel order [place] Draw.  Procedure                               Abnormality         Status                     ---------                               -----------         ------                     Green Top (Gel)[[id number]]                                  Final result               Lavender Top[[id number]]                                     Final result               Gold Top - SST[[id number]]                                   Final result               Light Blue Top[[id number]]                                   Final result                 Please view results for these tests on the individual orders.   COMPREHENSIVE METABOLIC PANEL    Narrative:     GFR Categories in Chronic Kidney Disease (CKD)      GFR Category          GFR (mL/min/1.73)    Interpretation  G1                     90 or greater         Normal or high (1)  G2                      60-89                Mild decrease (1)  G3a                   45-59                Mild to moderate decrease  G3b                   30-44                Moderate to severe decrease  G4                    15-29                Severe decrease  G5                    14 or less           Kidney failure          (1)In the absence of evidence of kidney disease, neither GFR category G1 or G2 fulfill the criteria for CKD.    eGFR calculation 2021 CKD-EPI creatinine equation, which does not include race as a factor   HIGH SENSITIVITIY TROPONIN T 1HR   URINALYSIS W/ MICROSCOPIC IF INDICATED (NO CULTURE)   POCT GLUCOSE FINGERSTICK   TYPE AND SCREEN   CBC AND DIFFERENTIAL     Narrative:     The following orders were created for panel order CBC & Differential.  Procedure                               Abnormality         Status                     ---------                               -----------         ------                     CBC Auto Differential[[id number]]        Abnormal            Final result                 Please view results for these tests on the individual orders.   GREEN TOP   LAVENDER TOP   GOLD TOP - SST   LIGHT BLUE TOP       EKG:   ECG 12 Lead Stroke Evaluation   Preliminary Result   HEART RATE=61  bpm   RR Xidbkxcp=669  ms   AL Rykkqhxf=583  ms   P Horizontal Axis=-2  deg   P Front Axis=39  deg   QRSD Wdrrziup=083  ms   QT Ceysmaur=051  ms   IJiS=958  ms   QRS Axis=87  deg   T Wave Axis=48  deg   - BORDERLINE ECG -   Sinus rhythm   Borderline right axis deviation   Borderline T abnormalities, anterior leads   Date and Time of Study:2025-01-12 21:02:48          Meds given in ED:   Medications   sodium chloride 0.9 % flush 10 mL (has no administration in time range)   iopamidol (ISOVUE-370) 76 % injection 150 mL (150 mL Intravenous Given by Other 1/12/25 2030)   acetaminophen (TYLENOL) tablet 1,000 mg (1,000 mg Oral Given 1/12/25 2250)       Imaging results:  XR Chest 1 View    Result Date: 1/12/2025  No evidence for active disease in the chest.   This report was finalized on 1/12/2025 10:28 PM by Brandon Calvo M.D on Workstation: [id number]      CT CEREBRAL PERFUSION WITH & WITHOUT CONTRAST    Addendum Date: 1/12/2025    ADDENDUM: 01 12 25 21:04 Call Doctor  Regarding Stroke. A telephone report of the findings was given to and confirmed by  Neuro Dr. Chauhan  on 01 12 21:04 (-05:00)     Result Date: 1/12/2025  Communications:  Call Doctor Stroke Electronically signed by Bubba Raymond MD on 01-12-25 at 2102    CT Angiogram Neck    Addendum Date: 1/12/2025    ADDENDUM: 01 12 25 21:04 Call Doctor  Regarding Stroke. A telephone report of the findings was given to  and confirmed by  Neuro Dr. Chauhan  on 01 12 21:04 (-05:00)     Result Date: 1/12/2025  Communications:  Call Doctor Stroke Electronically signed by Bubba Raymond MD on 01-12-25 at 2101    CT Angiogram Head w AI Analysis of LVO    Addendum Date: 1/12/2025    ADDENDUM: 01 12 25 21:04 Call Doctor Regarding Stroke, called Neurologist, STROKE @ [phone] (Pager) Regarding Stroke. A telephone report of the findings was given to and confirmed by  Neuro Dr. Chauhan  on 01 12 21:04 (-05:00)     Result Date: 1/12/2025  Communications:  Call Doctor Stroke; Neurologist, STROKE @ [phone] (Pager) Electronically signed by Bubba Raymond MD on 01-12-25 at 2100     Ambulatory status:   - Stand-by assist/1 person assist    Social issues:   Social History     Socioeconomic History    Marital status:     Number of children: 1    Years of education: BSN   Tobacco Use    Smoking status: Never     Passive exposure: Never    Smokeless tobacco: Never    Tobacco comments:     caffeine use   Vaping Use    Vaping status: Never Used   Substance and Sexual Activity    Alcohol use: Never    Drug use: Never    Sexual activity: Yes     Partners: Male     Birth control/protection: Post-menopausal       Peripheral Neurovascular  Peripheral Neurovascular (Adult)  Peripheral Neurovascular WDL: WDL    Neuro Cognitive  Neuro Cognitive (Adult)  Cognitive/Neuro/Behavioral WDL: .WDL except, orientation  Level of Consciousness: Alert  Orientation: disoriented to, place, situation, time  Speech: other (see comments) (repeats self)  Last Known Well Date: 01/12/25  Last Known Well Time: 1930  Memory Deficit: new learning, recall loss  Additional Documentation: Memory Deficit (Row)  Melina Coma Scale  Best Eye Response: 4-->(E4) spontaneous  Best Motor Response: 6-->(M6) obeys commands  Best Verbal Response: 5-->(V5) oriented  Melina Coma Scale Score: 15  NIH Stroke Scale  1a. Level of Consciousness: 0-->Alert, keenly responsive  1b.  LOC Questions: 0-->Answers both questions correctly  1c. LOC Commands: 0-->Performs both tasks correctly  2. Best Gaze: 0-->Normal  3. Visual: 0-->No visual loss  4. Facial Palsy: 0-->Normal symmetrical movements  5a. Motor Arm, Left: 0-->No drift, limb holds 90 (or 45) degrees for full 10 secs  5b. Motor Arm, Right: 0-->No drift, limb holds 90 (or 45) degrees for full 10 secs  6a. Motor Leg, Left: 0-->No drift, leg holds 30 degree position for full 5 secs  6b. Motor Leg, Right: 0-->No drift, leg holds 30 degree position for full 5 secs  7. Limb Ataxia: 0-->Absent  8. Sensory: 0-->Normal, no sensory loss  9. Best Language: 0-->No aphasia, normal  10. Dysarthria: 0-->Normal  11. Extinction and Inattention (formerly Neglect): 0-->No abnormality  Total (NIH Stroke Scale): 0    Learning  Learning Assessment  Learning Readiness and Ability: cognitive limitation noted, physical limitation noted    Respiratory  Respiratory WDL  Respiratory WDL: WDL    Abdominal Pain       Pain Assessments  Pain (Adult)  (0-10) Pain Rating: Rest: 5  Preferred Pain Scale: FACES (Bowen-Baker FACES Pain Rating Scale)  FACES Pain Rating: Rest: 0-->no hurt  FACES Pain Rating: Activity: 0-->no hurt  Pain Location: head  Pain Side/Orientation: right, anterior, upper  Pain Description: intermittent    NIH Stroke Scale  NIH Stroke Scale  1a. Level of Consciousness: 0-->Alert, keenly responsive  1b. LOC Questions: 0-->Answers both questions correctly  1c. LOC Commands: 0-->Performs both tasks correctly  2. Best Gaze: 0-->Normal  3. Visual: 0-->No visual loss  4. Facial Palsy: 0-->Normal symmetrical movements  5a. Motor Arm, Left: 0-->No drift, limb holds 90 (or 45) degrees for full 10 secs  5b. Motor Arm, Right: 0-->No drift, limb holds 90 (or 45) degrees for full 10 secs  6a. Motor Leg, Left: 0-->No drift, leg holds 30 degree position for full 5 secs  6b. Motor Leg, Right: 0-->No drift, leg holds 30 degree position for full 5 secs  7. Limb Ataxia:  0-->Absent  8. Sensory: 0-->Normal, no sensory loss  9. Best Language: 0-->No aphasia, normal  10. Dysarthria: 0-->Normal  11. Extinction and Inattention (formerly Neglect): 0-->No abnormality  Total (NIH Stroke Scale): 0    Laurie Pizarro RN  01/12/25 22:58 EST

## 2025-01-13 NOTE — ED NOTES
Patient states that in 1997 she had a similar episode to tonight: sudden onset confusion, difficulty walking. Patient states she was checked for TIA and MS. Patient states she was followed by a neurologist for years in [place] until they released her. Patient states the neurologist found spots on her MRI showing possible MS at that time and she went through therapy to help her with walking.

## 2025-01-13 NOTE — H&P
" [facility]   HISTORY AND PHYSICAL    Patient Name: Imani Mazariegos  : 1965  MRN: [id number]  Primary Care Physician:  Jim Amos APRN  Date of admission: 2025    Subjective   Subjective     Chief Complaint:   Chief Complaint   Patient presents with    Altered Mental Status     Sudden onset confusion with repetitive questioning         HPI:    Imani Mazariegos is a 59 y.o. female with a past medical history significant for hyperlipidemia, GERD, sinus bradycardia, and seizure who presented to the emergency department with a complaint of confusion and repetitive questioning and was admitted to the ED observation unit for further evaluation and workup. Patient states she felt overly tired today because she didn't sleep well last night due to some hip pain. States she remembers getting ready for [employer] and praying, but cannot remember anything else until she \"woke up in the emergency department.\" Family reported patient was able to speak and move normally, but she was confused and asking repetitive questions which continued while in the hospital. Patient states she does not feel stressed, but reports she is starting a new job tomorrow. Patient's daughter reports that she had spent the week with her mother-in-law, who lives 10 hours away, and they are having to make decisions about her future living arrangements.     Patient reports that in  she had been out to dinner with her  when she suddenly stopped talking and had difficulty walking. There was concern at that time that she may have MS, but \"nothing ever came of it.\"     ED work-up: CBC WNL except Monocyte 13.9. CMP WNL. Chest x-ray reported no active disease. CTA of Head/Neck were reported as negative.    On admission to the ED Observation Unit, patient is alert and oriented x 4.  Patient is able to speak about her new job and the difficult decisions with her mother-in-law, but continues to ask repetitive questions.  Vital signs stable.  " Cardiac monitor shows sinus bradycardia in the 40s, which is at patient's baseline.    Review of Systems   All systems were reviewed and negative except for: As documented in HPI.    Personal History     Past Medical History:   Diagnosis Date    Abdominal pain, LLQ     Abdominal pain, LUQ     Anxiety     Bradycardia     HR OFTEN STAYS IN 30S WITH ANESTHESIA    Cholecystitis     Colon polyps 2021    Ascending colon: tubular adenoma, sigmoid colon: tubular adenoma    Diverticulitis of colon 2013    Elevated cholesterol     Epilepsy without status epilepticus, not intractable     GERD (gastroesophageal reflux disease)     History of methicillin resistant staphylococcus aureus (MRSA)     IN AMNIOTIC FLUID    IBS (irritable bowel syndrome)     Kidney stones     Sciatica     Seizures     AS A CHILD- NO LONGER ON MEDICATIONS AT THIS TIME. LAST SEIZURE APPROX.     Stroke     RIGHT SIDE WEAKNESS, UNKNOWN CAUSE    Symptomatic states associated with artificial menopause     Urinary retention     Visual impairment     WEAR GLASSES       Past Surgical History:   Procedure Laterality Date    APPENDECTOMY N/A      SECTION N/A     CHOLECYSTECTOMY WITH INTRAOPERATIVE CHOLANGIOGRAM N/A 2023    Procedure: laparoscopic cholecystectomy with cholangiogram, possible open;  Surgeon: Erica Boyd MD;  Location: [facility] MAIN OR;  Service: General;  Laterality: N/A;    COLONOSCOPY N/A 2021    Procedure: COLONOSCOPY w/ polypectomy;  Surgeon: Kilo Macedo MD;  Location: [facility] MAIN OR;  Service: Gastroenterology;  Laterality: N/A;    COLONOSCOPY N/A 2015    Sigmoid diverticulosis, Repeat in 10 years-Dr. Erica Boyd, [facility]    COLONOSCOPY W/ POLYPECTOMY N/A     1 polyp, benign     ENDOSCOPY N/A 2021    Procedure: ESOPHAGOGASTRODUODENOSCOPY w/ biopsy;  Surgeon: Kilo Macedo MD;  Location: [facility] MAIN OR;  Service: Gastroenterology;  Laterality: N/A;    ENDOSCOPY N/A  4/18/2023    Procedure: ESOPHAGOGASTRODUODENOSCOPY;  Surgeon: Kilo Macedo MD;  Location: [facility] [place] MAIN OR;  Service: Gastroenterology;  Laterality: N/A;    TOTAL LAPAROSCOPIC HYSTERECTOMY SALPINGO OOPHORECTOMY Bilateral 2003    UPPER GASTROINTESTINAL ENDOSCOPY  2021       Family History: family history includes Arthritis in her father; Cancer in her father; Colon polyps in her mother; Diabetes in her maternal grandmother and paternal grandmother; Diverticulitis in her mother; Early death in her mother; Heart disease in her father and mother; Heart failure in her mother; Hepatitis in her mother; Hyperlipidemia in her father and mother; Hypertension in her father and mother; No Known Problems in her brother; Other in her mother; Parkinsonism in her father; Pernicious anemia in her mother; Sick sinus syndrome in her father and mother; Stomach cancer in her maternal grandfather. Otherwise pertinent FHx was reviewed and not pertinent to current issue.    Social History:  reports that she has never smoked. She has never been exposed to tobacco smoke. She has never used smokeless tobacco. She reports that she does not drink alcohol and does not use drugs.    Home Medications:  Vitamin D, fish oil, and pantoprazole    Allergies:  Allergies   Allergen Reactions    Flagyl [Metronidazole] GI Intolerance       Objective   Objective     Vitals:   Temp:  [97.8 °F (36.6 °C)] 97.8 °F (36.6 °C)  Heart Rate:  [52-81] 61  Resp:  [16] 16  BP: (141-168)/(75-87) 146/87  Physical Exam    Constitutional: Awake, alert   Eyes: PERRLA, sclerae anicteric, no conjunctival injection   HENT: NCAT, mucous membranes moist   Neck: Supple, no thyromegaly, no lymphadenopathy, trachea midline   Respiratory: Clear to auscultation bilaterally, nonlabored respirations    Cardiovascular: RRR, no murmurs, rubs, or gallops   Gastrointestinal: Soft, nontender, nondistended   Musculoskeletal: No bilateral ankle edema, no clubbing or cyanosis to  extremities   Psychiatric: Appropriate affect, cooperative  Neurologic: Oriented x 3, strength symmetric in all extremities, Cranial Nerves grossly intact to confrontation, speech clear   Skin: No rashes     Result Review    Result Review:  I have personally reviewed the results from the time of this admission to 1/12/2025 22:39 EST and agree with these findings:  [x]  Laboratory list / accordion  []  Microbiology  [x]  Radiology  [x]  EKG/Telemetry   []  Cardiology/Vascular   []  Pathology  [x]  Old records  []  Other:  Most notable findings include: None noted      Assessment & Plan   Assessment / Plan     Brief Patient Summary:  Imani Mazariegos is a 59 y.o. female who was admitted to the ED observation unit for further evaluation and workup of disorientation and repetitive questions.  CTA of the head and neck were negative.  Labs unremarkable.  MRI of the brain is pending.  Neurology consult in a.m.    Active Hospital Problems:  Active Hospital Problems    Diagnosis     **Altered mental status      Plan:     Altered mental status  Disorientation and repetitive questioning  Cardiac monitor  Labs unremarkable  Monocytes elevated  Chest x-ray unremarkable  CTA of head and neck reported as negative  MRI of brain with and without contrast is pending  Urinalysis pending  Neurology consulted by ED provider    Hyperlipidemia  Seizures as a child      VTE Prophylaxis:  No VTE prophylaxis order currently exists.      CODE STATUS:       Admission Status:  I believe this patient meets observation status.    Electronically signed by CASSIE Rodriguez, 01/12/25, 10:39 PM EST.      75 minutes has been spent by [facility] providers in the care of this patient while under observation status      I have worn appropriate PPE during this patient encounter, sanitized my hands both with entering and exiting patient's room.    I have discussed plan of care with patient including advance care plan  and/or surrogate decision maker.  Patient advises that their spouse will be their primary surrogate decision maker

## 2025-01-13 NOTE — ED PROVIDER NOTES
EMERGENCY DEPARTMENT ENCOUNTER    Room Number:  132/1  PCP: Jim Amos APRN  Patient Care Team:  Jim Amos APRN as PCP - General (Nurse Practitioner)  Kilo Macedo MD as Consulting Physician (Gastroenterology)  Mandie Carlton APRN as Nurse Practitioner (Nurse Practitioner)  Erica Boyd MD as Surgeon (General Surgery)   Independent Historians: Patient, family    HPI:  Chief Complaint: Confusion    A complete HPI/ROS/PMH/PSH/SH/FH are unobtainable due to: None    Chronic or social conditions impacting patient care (Social Determinants of Health): None  (Financial Resource Strain / Food Insecurity / Transportation Needs / Physical Activity / Stress / Social Connections / Intimate Partner Violence / Housing Stability)    Context: Imani Mazariegos is a 59 y.o. female who presents to the ED c/o acute confusion that occurred suddenly roughly 45 minutes prior to arrival in the ED.  Patient apparently was at [facility] and is under a lot of stress lately told her family folic something was wrong.  Patient then had some confusion.  Was able to speak and move normally per family.  Denies any pain.  Patient apparently still remember who she was not family members were.  However does not recall what the month is or that she recently changed jobs.  Patient not taking medication does not have any significant chronic health problems per family.    Review of prior external notes (non-ED) -and- Review of prior external test results outside of this encounter: I reviewed patient's primary care note from 9/11/2024    Prescription drug monitoring program review:         PAST MEDICAL HISTORY  Active Ambulatory Problems     Diagnosis Date Noted    Symptomatic states associated with artificial menopause     Back pain, lumbosacral     Hyperlipidemia     Fl[place] 07/02/2013    Seizure 10/13/2021    Vaginal atrophy 07/02/2013    Diverticulitis 11/13/2021    Gastroesophageal reflux disease 02/02/2022    Hormone replacement  therapy (HRT) 2022    Healthcare maintenance 2022    Nausea and vomiting 10/05/2022    Left upper quadrant abdominal pain 10/05/2022    Bloating 10/05/2022    Biliary dyskinesia 2023    Bradycardia, sinus 2024     Resolved Ambulatory Problems     Diagnosis Date Noted    Epilepsy without status epilepticus, not intractable     Abdominal pain, LLQ     Abdominal pain, LUQ      Past Medical History:   Diagnosis Date    Anxiety     Bradycardia     Cholecystitis     Colon polyps 2021    Diverticulitis of colon 2013    Elevated cholesterol     GERD (gastroesophageal reflux disease)     History of methicillin resistant staphylococcus aureus (MRSA)     IBS (irritable bowel syndrome)     Kidney stones     Sciatica     Seizures     Stroke     Urinary retention     Visual impairment          PAST SURGICAL HISTORY  Past Surgical History:   Procedure Laterality Date    APPENDECTOMY N/A      SECTION N/A     CHOLECYSTECTOMY WITH INTRAOPERATIVE CHOLANGIOGRAM N/A 2023    Procedure: laparoscopic cholecystectomy with cholangiogram, possible open;  Surgeon: Erica Boyd MD;  [facility]: [facility] MAIN OR;  Service: General;  Laterality: N/A;    COLONOSCOPY N/A 2021    Procedure: COLONOSCOPY w/ polypectomy;  Surgeon: Kilo Macedo MD;  [place] [facility] [place]  Service: Gastroenterology;  Laterality: N/A;    COLONOSCOPY N/A 2015    Sigmoid diverticulosis, Repeat in 10 years-Dr. Erica Boyd, [facility]    COLONOSCOPY W/ POLYPECTOMY N/A     1 polyp, benign     ENDOSCOPY N/A 2021    Procedure: ESOPHAGOGASTRODUODENOSCOPY w/ biopsy;  Surgeon: Kilo Macedo MD;  [place] [facility] [place];  Service: Gastroenterology;  Laterality: N/A;    ENDOSCOPY N/A 2023    Procedure: ESOPHAGOGASTRODUODENOSCOPY;  Surgeon: Kilo Macedo MD;  [place] [facility] [place];  Service: Gastroenterology;  Laterality: N/A;    TOTAL LAPAROSCOPIC HYSTERECTOMY SALPINGO  OOPHORECTOMY Bilateral 2003    UPPER GASTROINTESTINAL ENDOSCOPY  2021         FAMILY HISTORY  Family History   Problem Relation Age of Onset    Sick sinus syndrome Mother     Heart failure Mother     Pernicious anemia Mother     Hepatitis Mother         Hep C    Other Mother         Peripheral neuropathy    Colon polyps Mother     Diverticulitis Mother     Early death Mother         natural causes    Heart disease Mother         cad    Hyperlipidemia Mother     Hypertension Mother     Sick sinus syndrome Father         pacemaker    Arthritis Father     Cancer Father         prostate    Heart disease Father         Parkinsons/prostate cancer/Aicd    Hyperlipidemia Father     Hypertension Father     Parkinsonism Father     No Known Problems Brother     Diabetes Maternal Grandmother     Stomach cancer Maternal Grandfather     Diabetes Paternal Grandmother     Colon cancer Neg Hx     Malig Hyperthermia Neg Hx          SOCIAL HISTORY  Social History     Socioeconomic History    Marital status:     Number of children: 1    Years of education: BSN   Tobacco Use    Smoking status: Never     Passive exposure: Never    Smokeless tobacco: Never    Tobacco comments:     caffeine use   Vaping Use    Vaping status: Never Used   Substance and Sexual Activity    Alcohol use: Never    Drug use: Never    Sexual activity: Yes     Partners: Male     Birth control/protection: Post-menopausal         ALLERGIES  Flagyl [metronidazole]        REVIEW OF SYSTEMS  Review of Systems  Included in HPI  All systems reviewed and negative except for those discussed in HPI.      PHYSICAL EXAM    I have reviewed the triage vital signs and nursing notes.    ED Triage Vitals   Temp Heart Rate Resp BP SpO2   01/12/25 2013 01/12/25 2013 01/12/25 2013 01/12/25 2020 01/12/25 2013   97.8 °F (36.6 °C) 58 16 168/75 98 %      Temp src Heart Rate Source Patient Position BP Location FiO2 (%)   01/12/25 2013 01/12/25 2013 01/12/25 2020 01/12/25 2020 --    Tympanic Monitor Lying Left arm        Physical Exam  GENERAL: alert, no acute distress  SKIN: Warm, dry  HENT: Normocephalic, atraumatic  EYES: no scleral icterus  CV: regular rhythm, regular rate  RESPIRATORY: normal effort, lungs clear  ABDOMEN: soft, nontender, nondistended  MUSCULOSKELETAL: no deformity  NEURO: alert, moves all extremities, follows commands       1a. Level of Consciousness: 0-->Alert, keenly responsive  1b. LOC Questions: 1-->Answers one question correctly  1c. LOC Commands: 0-->Performs both tasks correctly  2. Best Gaze: 0-->Normal  3. Visual: 1-->Partial hemianopia (Lower Left Visual Quad. Pt. states one finger there were two)  4. Facial Palsy: 0-->Normal symmetrical movements  5a. Motor Arm, Left: 0-->No drift, limb holds 90 (or 45) degrees for full 10 secs  5b. Motor Arm, Right: 0-->No drift, limb holds 90 (or 45) degrees for full 10 secs  6a. Motor Leg, Left: 0-->No drift, leg holds 30 degree position for full 5 secs  6b. Motor Leg, Right: 0-->No drift, leg holds 30 degree position for full 5 secs  7. Limb Ataxia: 0-->Absent  8. Sensory: 0-->Normal, no sensory loss  9. Best Language: 0-->No aphasia, normal  10. Dysarthria: 0-->Normal  11. Extinction and Inattention (formerly Neglect): 0-->No abnormality    Total (NIH Stroke Scale): 2        LAB RESULTS  Recent Results (from the past 24 hours)   Comprehensive Metabolic Panel    Collection Time: 01/12/25  8:19 PM    Specimen: Blood   Result Value Ref Range    Glucose 95 65 - 99 mg/dL    BUN 20 8 - 23 mg/dL    Creatinine 0.81 0.57 - 1.00 mg/dL    Sodium 139 136 - 145 mmol/L    Potassium 4.1 3.5 - 5.2 mmol/L    Chloride 107 98 - 107 mmol/L    CO2 22.0 22.0 - 29.0 mmol/L    Calcium 8.9 8.6 - 10.5 mg/dL    Total Protein 6.6 6.0 - 8.5 g/dL    Albumin 4.2 3.5 - 5.2 g/dL    ALT (SGPT) 30 1 - 33 U/L    AST (SGOT) 27 1 - 32 U/L    Alkaline Phosphatase 98 39 - 117 U/L    Total Bilirubin 0.2 0.0 - 1.2 mg/dL    Globulin 2.4 gm/dL    A/G Ratio 1.8  g/dL    BUN/Creatinine Ratio 24.7 7.0 - 25.0    Anion Gap 10.0 5.0 - 15.0 mmol/L    eGFR 82.7 >60.0 mL/min/1.73   Protime-INR    Collection Time: 01/12/25  8:19 PM    Specimen: Blood   Result Value Ref Range    Protime 12.8 11.7 - 14.2 Seconds    INR 0.94 0.90 - 1.10   aPTT    Collection Time: 01/12/25  8:19 PM    Specimen: Blood   Result Value Ref Range    PTT 26.0 22.7 - 35.4 seconds   Type & Screen    Collection Time: 01/12/25  8:19 PM    Specimen: Blood   Result Value Ref Range    ABO Type A     RH type Negative     Antibody Screen Negative     T&S Expiration Date 1/15/2025 11:59:59 PM    Green Top (Gel)    Collection Time: 01/12/25  8:19 PM   Result Value Ref Range    Extra Tube Hold for add-ons.    Lavender Top    Collection Time: 01/12/25  8:19 PM   Result Value Ref Range    Extra Tube hold for add-on    Gold Top - SST    Collection Time: 01/12/25  8:19 PM   Result Value Ref Range    Extra Tube Hold for add-ons.    Light Blue Top    Collection Time: 01/12/25  8:19 PM   Result Value Ref Range    Extra Tube Hold for add-ons.    CBC Auto Differential    Collection Time: 01/12/25  8:19 PM    Specimen: Blood   Result Value Ref Range    WBC 6.13 3.40 - 10.80 10*3/mm3    RBC 4.63 3.77 - 5.28 10*6/mm3    Hemoglobin 14.5 12.0 - 15.9 g/dL    Hematocrit 43.1 34.0 - 46.6 %    MCV 93.1 79.0 - 97.0 fL    MCH 31.3 26.6 - 33.0 pg    MCHC 33.6 31.5 - 35.7 g/dL    RDW 12.3 12.3 - 15.4 %    RDW-SD 41.8 37.0 - 54.0 fl    MPV 9.8 6.0 - 12.0 fL    Platelets 269 140 - 450 10*3/mm3    Neutrophil % 44.6 42.7 - 76.0 %    Lymphocyte % 39.2 19.6 - 45.3 %    Monocyte % 13.9 (H) 5.0 - 12.0 %    Eosinophil % 1.6 0.3 - 6.2 %    Basophil % 0.5 0.0 - 1.5 %    Immature Grans % 0.2 0.0 - 0.5 %    Neutrophils, Absolute 2.74 1.70 - 7.00 10*3/mm3    Lymphocytes, Absolute 2.40 0.70 - 3.10 10*3/mm3    Monocytes, Absolute 0.85 0.10 - 0.90 10*3/mm3    Eosinophils, Absolute 0.10 0.00 - 0.40 10*3/mm3    Basophils, Absolute 0.03 0.00 - 0.20 10*3/mm3     Immature Grans, Absolute 0.01 0.00 - 0.05 10*3/mm3    nRBC 0.0 0.0 - 0.2 /100 WBC   High Sensitivity Troponin T    Collection Time: 01/12/25  8:19 PM    Specimen: Blood   Result Value Ref Range    HS Troponin T <6 <14 ng/L   ECG 12 Lead Stroke Evaluation    Collection Time: 01/12/25  9:02 PM   Result Value Ref Range    QT Interval 464 ms    QTC Interval 468 ms   POC Glucose Once    Collection Time: 01/12/25  9:56 PM    Specimen: Blood   Result Value Ref Range    Glucose 96 70 - 130 mg/dL   High Sensitivity Troponin T 1Hr    Collection Time: 01/12/25 10:55 PM    Specimen: Hand, Right; Blood   Result Value Ref Range    HS Troponin T <6 <14 ng/L    Troponin T Numeric Delta     Urinalysis With Microscopic If Indicated (No Culture) - Urine, Clean Catch    Collection Time: 01/12/25 11:09 PM    Specimen: Urine, Clean Catch   Result Value Ref Range    Color, UA Yellow Yellow, Straw    Appearance, UA Clear Clear    pH, UA 7.0 5.0 - 8.0    Specific Gravity, UA >1.030 (H) 1.005 - 1.030    Glucose, UA Negative Negative    Ketones, UA Negative Negative    Bilirubin, UA Negative Negative    Blood, UA Negative Negative    Protein, UA Negative Negative    Leuk Esterase, UA Small (1+) (A) Negative    Nitrite, UA Negative Negative    Urobilinogen, UA 0.2 E.U./dL 0.2 - 1.0 E.U./dL   Urinalysis, Microscopic Only - Urine, Clean Catch    Collection Time: 01/12/25 11:09 PM    Specimen: Urine, Clean Catch   Result Value Ref Range    RBC, UA 0-2 None Seen, 0-2 /HPF    WBC, UA 3-5 (A) None Seen, 0-2 /HPF    Bacteria, UA None Seen None Seen /HPF    Squamous Epithelial Cells, UA 0-2 None Seen, 0-2 /HPF    Hyaline Casts, UA None Seen None Seen /LPF    Methodology Automated Microscopy        I ordered the above labs and independently reviewed the results.        RADIOLOGY  XR Chest 1 View    Result Date: 1/12/2025  CHEST SINGLE VIEW  HISTORY: 59 years of age, Female.  Acute Stroke Protocol (onset < 12 hrs)  COMPARISON: AP chest 09/28/2023.   FINDINGS: Heart and mediastinal structures are within normal limits. Lungs appear clear and there is no evidence for pulmonary edema, pleural effusion or infiltrate. Cardiac monitoring leads are present.      No evidence for active disease in the chest.   This report was finalized on 1/12/2025 10:28 PM by Brandon Calvo M.D on Workstation: [id number]      CT CEREBRAL PERFUSION WITH & WITHOUT CONTRAST    Addendum Date: 1/12/2025    ADDENDUM: 01 12 25 21:04 Call Doctor  Regarding Stroke. A telephone report of the findings was given to and confirmed by  Neuro Dr. Chauhan  on 01 12 21:04 (-05:00)     Result Date: 1/12/2025  CR Patient: LARRY LAZO  Time Out: 21:02 Exam(s): CT PERFUSION EXAM:   CT Brain Perfusion With Intravenous Contrast CLINICAL HISTORY:    Reason for exam: Neuro deficit, acute, stroke suspected. Neuro deficit,  acute, stroke suspected. TECHNIQUE:   Routine CT brain cerebral perfusion study was performed using IV contrast.  Reformats and postprocessing were performed by the technologist.  CTDI is 332.89 mGy and DLP is 3924.8 mGy-cm.  This CT exam was performed according to the principle of ALARA (As Low As Reasonably Achievable) by using one or more of the following dose reduction techniques: automated exposure control, adjustment of the mA and or kV according to patient size, and or use of iterative reconstruction technique. COMPARISON:   None. FINDINGS:   Core infarct:  None.  rCBF > 30% throughout the brain.  No findings to suggest acute core infarct.   Reversible ischemia:  None.  TMax < 6s throughout the brain.  No findings to suggest reversible ischemia or acute core infarct.   Brain and extra-axial spaces:  No intra- or extra-axial hemorrhage. IMPRESSION:       No CT brain perfusion evidence of acute infarct.     Communications:  Call Doctor Stroke Electronically signed by Bubba Raymond MD on 01-12-25 at 2102    CT Angiogram Neck    Addendum Date: 1/12/2025    ADDENDUM: 01 12 25 21:04  Call Doctor  Regarding Stroke. A telephone report of the findings was given to and confirmed by  Neuro Dr. Chauhan  on 01 12 21:04 (-05:00)     Result Date: 1/12/2025  CR Patient: LARRY LAZO  Time Out: 21:01 Exam(s): CTA NECK IV Amt: 150ml isovue 370 EXAM:   CT Angiography Neck With Intravenous Contrast CLINICAL HISTORY:    Reason for exam: Stroke, follow up. Neuro deficit, acute, stroke suspected. TECHNIQUE:   Routine carotid CT angiography protocol was performed with intravenous contrast.  NASCET criteria using the distal ICAs for comparison were used for evaluation of stenoses.  CTDI is 496.4 mGy and DLP is 7391 mGy-cm.  This CT exam was performed according to the principle of ALARA (As Low As Reasonably Achievable) by using one or more of the following dose reduction techniques: automated exposure control, adjustment of the mA and or kV according to patient size, and or use of iterative reconstruction technique.   MIP reconstructed images were created and reviewed. COMPARISON:   None. FINDINGS:  VASCULATURE:   Right common carotid artery:  No occlusion or significant stenosis.  No dissection.   Right internal carotid artery:  Extracranial segment is patent with no occlusion or significant stenosis.  No dissection.   Right vertebral artery:  No occlusion or significant stenosis.  No dissection.   Left common carotid artery:  No occlusion or significant stenosis.  No dissection.   Left internal carotid artery:  Extracranial segment is patent with no occlusion or significant stenosis.  No dissection.   Left vertebral artery:  No occlusion or significant stenosis.  No dissection.  NECK:   Bones joints:  Unremarkable.  No acute fracture.   Soft tissues:  Unremarkable.   Lung apices:  Clear.  CAROTID STENOSIS REFERENCE USING NASCET CRITERIA:   % ICA stenosis = (1 - narrowest ICA diameter diameter of distal cervical ICA) x 100.   Mild - <50% stenosis.   Moderate - 50-69% stenosis.   Severe - 70-94% stenosis.   Near  occlusion - 95-99% stenosis.   Occluded - 100% stenosis. IMPRESSION:       Negative CTA neck.     Communications:  Call Doctor Stroke Electronically signed by Bubba Raymond MD on 01-12-25 at 2101    CT Angiogram Head w AI Analysis of LVO    Addendum Date: 1/12/2025    ADDENDUM: 01 12 25 21:04 Call Doctor Regarding Stroke, called Neurologist, [facility] @ [phone] (Pager) Regarding Stroke. A telephone report of the findings was given to and confirmed by  Neuro Dr. Chauhan  on 01 12 21:04 (-05:00)     Result Date: 1/12/2025  CR Patient: LARRY LAZO  Time Out: 21:00 Exam(s): CTA HEAD IV Amt: 150ml isovue 370 EXAM:   CT Angiography Head With Intravenous Contrast CLINICAL HISTORY:    Reason for exam: Stroke, follow up. Neuro deficit, acute, stroke suspected. Acute Stroke. TECHNIQUE: AI analysis of LVO was utilized   Axial computed tomographic angiography images of the head with intravenous contrast.  CTDI is 496.4 mGy and DLP is 7391 mGy-cm.  This CT exam was performed according to the principle of ALARA (As Low As Reasonably Achievable) by using one or more of the following dose reduction techniques: automated exposure control, adjustment of the mA and or kV according to patient size, and or use of iterative reconstruction technique.   MIP reconstructed images were created and reviewed. COMPARISON:   No relevant prior studies available. FINDINGS:   Right internal carotid artery:  Intracranial segment is patent with no significant stenosis.  No aneurysm.   Right anterior cerebral artery:  No occlusion or significant stenosis.  No aneurysm.   Right middle cerebral artery:  No occlusion or significant stenosis.  No aneurysm.   Right posterior cerebral artery:  No occlusion or significant stenosis.  No aneurysm.   Right vertebral artery:  Unremarkable as visualized.   Left internal carotid artery:  Intracranial segment is patent with no significant stenosis.  No aneurysm.   Left anterior cerebral artery:  No occlusion  or significant stenosis.  No aneurysm.   Left middle cerebral artery:  No occlusion or significant stenosis.  No aneurysm.   Left posterior cerebral artery:  No occlusion or significant stenosis.  No aneurysm.   Left vertebral artery:  Unremarkable as visualized.   Basilar artery:  No occlusion or significant stenosis.  No aneurysm. IMPRESSION:       Normal head CTA.     Communications:  Call Doctor Stroke; Neurologist, [facility] @ [phone] (Pager) Electronically signed by Bubba Raymond MD on 01-12-25 at 2100     I ordered the above noted radiological studies. Reviewed by me and discussed with radiologist.  See dictation for official radiology interpretation.      PROCEDURES    Procedures      MEDICATIONS GIVEN IN ER    Medications   sodium chloride 0.9 % flush 10 mL (has no administration in time range)   diazePAM (VALIUM) injection 2.5 mg (has no administration in time range)   pantoprazole (PROTONIX) EC tablet 40 mg (has no administration in time range)   sodium chloride 0.9 % flush 10 mL (10 mL Intravenous Not Given 1/13/25 0150)   sodium chloride 0.9 % flush 10 mL (has no administration in time range)   sodium chloride 0.9 % infusion 40 mL (has no administration in time range)   ondansetron ODT (ZOFRAN-ODT) disintegrating tablet 4 mg (has no administration in time range)     Or   ondansetron (ZOFRAN) injection 4 mg (has no administration in time range)   iopamidol (ISOVUE-370) 76 % injection 150 mL (150 mL Intravenous Given by Other 1/12/25 2030)   acetaminophen (TYLENOL) tablet 1,000 mg (1,000 mg Oral Given 1/12/25 2250)         ORDERS PLACED DURING THIS VISIT:  Orders Placed This Encounter   Procedures    CT Angiogram Head w AI Analysis of LVO    CT Angiogram Neck    CT CEREBRAL PERFUSION WITH & WITHOUT CONTRAST    XR Chest 1 View    MRI Brain With & Without Contrast    [place] Draw    Comprehensive Metabolic Panel    Protime-INR    aPTT    CBC Auto Differential    High Sensitivity Troponin T    High  Sensitivity Troponin T 1Hr    Urinalysis With Microscopic If Indicated (No Culture) - Urine, Clean Catch    Urinalysis, Microscopic Only - Urine, Clean Catch    NPO Diet NPO Type: Strict NPO    Measure Actual Weight    Notify Provider    Notify Provider for SBP Greater Than 140 for Hemorrhagic Stroke Patient    Head of Bed 30 Degrees or Less    Undress and Gown    Continuous Pulse Oximetry    Vital Signs    No Hypotonic Fluids    Nursing Dysphagia Screening (Complete Prior to Giving anything PO)    Intake & Output    Weigh Patient    Oral Care    Maintain IV Access    Telemetry - Place Orders & Notify Provider of Results When Patient Experiences Acute Chest Pain, Dysrhythmia or Respiratory Distress    May Be Off Telemetry for Tests    Vital Signs    Up With Assistance    Neuro Checks    Code Status and Medical Interventions: CPR (Attempt to Resuscitate); Full Support    Inpatient Neurology Consult Stroke    Inpatient Neurology Consult Stroke    Oxygen Therapy- Nasal Cannula; Titrate 1-6 LPM Per SpO2; 90 - 95%    POC Glucose Once    POC Glucose Once    ECG 12 Lead Stroke Evaluation    Telemetry Scan    Type & Screen    Insert Large-Bore Peripheral IV - RIGHT AC Preferred    Insert Peripheral IV    Initiate ED Observation Status    CBC & Differential    Green Top (Gel)    Lavender Top    Gold Top - SST    Light Blue Top         PROGRESS, DATA ANALYSIS, CONSULTS, AND MEDICAL DECISION MAKING    All labs have been independently interpreted by me.  All radiology studies have been reviewed by me and discussed with radiologist dictating the report.   EKG's independently viewed and interpreted by me.  Discussion below represents my analysis of pertinent findings related to patient's condition, differential diagnosis, treatment plan and final disposition.    Differential diagnosis includes but is not limited to:  Hypoglycemia, hyperglycemia, DKA, overdose, ethanol intoxication, thiamine deficiency, niacin deficiency,  hypothymia, hyperviscosity, Taiwo’s disease, hyponatremia, hypernatremia, liver failure, kidney failure, hyper or hypothyroid, no insufficiency, hypoxia, hypercarbia, carbon monoxide poisoning, postanoxic encephalopathy, ischemic stroke, intracranial bleed, subarachnoid hemorrhage, brain tumor, closed head injury, epidural hematoma, epidural hematoma, seizure activity, postictal state, syncopal episode, disseminated encephalomyelitis, central pontine myelinolysis, post cardiac arrest, bacterial meningitis, viral meningitis, fungal meningitis, encephalitis, brain abscess, subdural empyema, hysteria, catatonic state, malingering, hypertensive encephalopathy, vasculitis, TTP, DIC  .    Clinical Scores:              ED Course as of 01/13/25 0231   Sun Jan 12, 2025 2020 D/w Dr Anaya.  Recommends team D.  Pt's NIH score is 3.   [TJ]   2106 EKG          EKG time: 2102  Rhythm/Rate: Sinus rhythm rate 61  P waves and MO: Normal  QRS, axis: Narrow regular  ST and T waves: Normal    Interpreted Contemporaneously by me, independently viewed [TJ]   2118 Reassessment: Patient's lower leg weakness has resolved.  Patient however is experiencing a lot of repetitive questioning. [TJ]   2233 D/w Obs.  Happy to admit the pt. [TJ]      ED Course User Index  [TJ] Mathew Cole MD         Critical care provider statement:    Critical care time (minutes): 33.   Critical care time was exclusive of:  Separately billable procedures and treating other patients   Critical care was necessary to treat or prevent imminent or life-threatening deterioration of the following conditions:  CNS Failure   Critical care was time spent personally by me on the following activities:  Development of treatment plan with patient or surrogate, discussions with consultants, evaluation of patient's response to treatment, examination of patient, obtaining history from patient or surrogate, ordering and performing treatments and interventions,  ordering and review of laboratory studies, ordering and review of radiographic studies, pulse oximetry, re-evaluation of patient's condition and review of old charts.    PPE: The patient wore a mask and I wore an N95 mask throughout the entire patient encounter.       AS OF 02:31 EST VITALS:    BP - 108/71  HR - 56  TEMP - 97.7 °F (36.5 °C) (Oral)  O2 SATS - 98%        DIAGNOSIS  Final diagnoses:   Memory deficit   Weakness of left lower extremity         DISPOSITION  ED Disposition       ED Disposition   Decision to Admit    Condition   --    Comment   --                  Note Disclaimer: At [facility] we believe that sharing information builds trust and better relationships. You are receiving this note because you recently visited [facility]. It is possible you will see health information before a provider has talked with you about it. This kind of information can be easy to misunderstand. To help you fully understand what it means for your health, we urge you to discuss this note with your provider.         Mathew Cole MD  01/13/25 [id number]

## 2025-01-13 NOTE — ED NOTES
Pts daughter arrived prior to pt stating she was on her way after being at [facility] & having to sit down w/ a sudden onset of confusion.     Pt arrived via pv from [facility] w/ confusion. Pt states something is wrong she is just very confused. Pt tearful during triage exam & repeating herself.

## 2025-01-13 NOTE — CONSULTS
Neurology Consult Note    Consult Date: 1/13/2025    Referring MD: Mathew Cole VI, *    Reason for Consult I have been asked to see the patient in neurological consultation to render advice and opinion regarding transient memory loss.    Imani Mazariegos is a 59 y.o. 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  Patient presented to the hospital from the [facility] with confusion and transient memory loss, patient was repeating herself and not remembering about what happened.  Currently, she is back to baseline, initially did not have a headache then last night she developed right-sided headache, throbbing in nature, associated with photophobia no phonophobia, no nausea/vomiting.  She had an MRI brain with and without contrast which showed no acute abnormality.  Currently NIH score of 0.  She stated that she is under a lot of stress due to financial issues and sick family.    Past Medical History:   Diagnosis Date    Abdominal pain, LLQ     Abdominal pain, LUQ     Anxiety     Bradycardia     HR OFTEN STAYS IN 30S WITH ANESTHESIA    Cholecystitis     Colon polyps 02/09/2021    Ascending colon: tubular adenoma, sigmoid colon: tubular adenoma    Diverticulitis of colon 09/2013    Elevated cholesterol     Epilepsy without status epilepticus, not intractable     GERD (gastroesophageal reflux disease)     History of methicillin resistant staphylococcus aureus (MRSA)     IN AMNIOTIC FLUID    IBS (irritable bowel syndrome)     Kidney stones     Sciatica     Seizures     AS A CHILD- NO LONGER ON MEDICATIONS AT THIS TIME. LAST SEIZURE APPROX. 2020    Stroke 1999    RIGHT SIDE WEAKNESS, UNKNOWN CAUSE    Symptomatic states associated with artificial menopause     Urinary retention     Visual impairment     WEAR  "GLASSES       Exam  /50 (BP Location: Right arm, Patient Position: Lying)   Pulse 54   Temp 97.9 °F (36.6 °C) (Oral)   Resp 16   Ht 167.6 cm (66\")   Wt 90.3 kg (199 lb 1.2 oz)   SpO2 97%   BMI 32.13 kg/m²   Gen: NAD, vitals reviewed  MS: oriented x3, recent/remote memory intact, normal attention/concentration, language intact, no neglect.  CN: visual acuity grossly normal, PERRL, EOMI, no facial droop, no dysarthria  Motor: 5/5 throughout upper and lower extremities, normal tone    NIH Stroke Scale :    (0_) 1a. Level of consciousness (LOC): 0=alert;1=arousable by minor stimulation;2=obtunded or needs strong stimulation to attend;3=unresponsive or reflex responses only  (0_) 1b. LOC Questions: 0=answers both;1=answers one;2=answers neither  (0_) 1c. LOC Commands: 0=performs both tasks;1=performs one task;2=performs neither task  (0_) 2. Best Gaze: 0=normal;1=partial gaze palsy;2=forced deviation or total gaze paresis  (0_) 3. Visual: 0=normal;1=partial hemianopia;2=complete hemianopia;3=blind  (0_) 4. Facial palsy: 0=normal;1=minor paresis;2=partial paralysis;3=complete paralysis  FOR 5 AND 6 BELOW: 0=normal;1=drifts but maintains in air;2=unable to maintain in air;3=moves but unable to lift against gravity;4=no movement  (0_)0 (_)1 (_)2 (_)3 (_)4 (_)NA 5a. Motor arm-left  (0_)0 (_)1 (_)2 (_)3 (_)4 (_)NA 5b. Motor arm-right  (0_)0 (_)1 (_)2 (_)3 (_)4 (_)NA 6a. Motor leg-left  (0_)0 (_)1 (_)2 (_)3 (_)4 (_)NA 6ba. Motor leg-right  (0_) 7. Limb ataxia:0=absent;1=unilateral;2=bilateral; NA=unable to test  (0_) 8. Sensory: 0=normal;1=mild-moderate loss;2-severe or total loss  (0_) 9. Best language: 0=normal;1=mild-moderate aphasia, some deficits apparent but able to communicate;2=severe aphasia, fragmentary expression only, unable to communicate well;3=global aphasia, mute and no comprehension  (0_)10. Dysarthria: 0=normal;1=mild-moderate, slurs some words;2=severe, speech mostly unintelligible; NA=unable to " test (e.g.,intubation)  (0_)11. Extinction/Inattention: 0=normal;1=visual,tactile,auditory or other extinction to bilateral simultaneous stimulation, but no severe neglect;2=answers neither      NIH Score: Complete  0    DATA:    Lab Results   Component Value Date    GLUCOSE 95 01/12/2025    CALCIUM 8.9 01/12/2025     01/12/2025    K 4.1 01/12/2025    CO2 22.0 01/12/2025     01/12/2025    BUN 20 01/12/2025    CREATININE 0.81 01/12/2025    EGFRIFAFRI 72 02/02/2022    EGFRIFNONA 62 02/02/2022    BCR 24.7 01/12/2025    ANIONGAP 10.0 01/12/2025     Lab Results   Component Value Date    WBC 6.13 01/12/2025    HGB 14.5 01/12/2025    HCT 43.1 01/12/2025    MCV 93.1 01/12/2025     01/12/2025       Lab review: Above labs reviewed    Imaging review: MRI brain with and without contrast personally reviewed and showed no acute abnormality    Diagnoses:  -Transient global amnesia  -Migraine with aura without status migrainosus  -History of childhood epilepsy resolved.  Patient not on antiseizure medication for 20+ years      PLAN:   -Routine EEG  -Start aspirin 81 mg daily as migraine with aura and increases risk for stroke  -Riboflavin 400 mg daily and magnesium oxide 400 mg daily  -Seizure precautions  -Follow-up with Dr. Cm general neurology in 2-3 months.  MA contacted to arrange a follow-up  -Discussed with the observation unit provider    Seizure Precautions:  ·    Do not drive 90 days. (this is to include: car, bicycle, or motorcycle)  ·    Do not drive operate dangerous equipment such as power tools, heavy machinery with moving parts, or an open flame.  ·    Do not swim alone (this would include a bathtub full of water is a small swimming pool).  ·    Avoid unsecured heights. (this is to include: scaffolding, ladders, trees, and roofs).    Medical Decision Making for this neurology consultation consists of the following:  Review of previous chart, including H/P, provider and nursing notes as  applicable.  Review of medications and vital signs.  Review of previous labs, neuroimaging, and additional relevant diagnostics.   Interpretation of laboratory, imaging, and other diagnostic results  Discussion with other providers and family   Total face-to-face/floor time: 30-61 minutes.

## 2025-01-14 ENCOUNTER — TRANSITIONAL CARE MANAGEMENT TELEPHONE ENCOUNTER (OUTPATIENT)
Dept: CALL CENTER | Facility: HOSPITAL | Age: 60
End: 2025-01-14
Payer: COMMERCIAL

## 2025-01-14 NOTE — CASE MANAGEMENT/SOCIAL WORK
Case Management Discharge Note      Final Note: Home         Selected Continued Care - Discharged on 1/13/2025 Admission date: 1/12/2025 - Discharge disposition: Home or Self Care      Destination    No services have been selected for the patient.                Durable Medical Equipment    No services have been selected for the patient.                Dialysis/Infusion    No services have been selected for the patient.                Home Medical Care    No services have been selected for the patient.                Therapy    No services have been selected for the patient.                Community Resources    No services have been selected for the patient.                Community & DME    No services have been selected for the patient.                    Transportation Services  Private: Car    Final Discharge Disposition Code: 01 - home or self-care

## 2025-01-14 NOTE — OUTREACH NOTE
Call Center TCM Note      Flowsheet Row Responses   [facility] patient discharged from? [facility]   Does the patient have one of the following disease processes/diagnoses(primary or secondary)? Other   TCM attempt successful? Yes   Call start time 1525   Call end time 1527   Discharge diagnosis Altered mental status  Disorientation and repetitive questioning   Meds reviewed with patient/caregiver? Yes   Is the patient having any side effects they believe may be caused by any medication additions or changes? No   Does the patient have all medications ordered at discharge? Yes   Prescription comments New rx's asa, Mag-Ox, Riboflavin in place   Is the patient taking all medications as directed (includes completed medication regime)? Yes   Comments TCM APPT WITH PCP CASSIE AMOS IS 01/17/2025   Does the patient have an appointment with their PCP within 7-14 days of discharge? Yes   Has home health visited the patient within 72 hours of discharge? N/A   Psychosocial issues? No   Did the patient receive a copy of their discharge instructions? Yes   Nursing interventions Reviewed instructions with patient   What is the patient's perception of their health status since discharge? Improving   Is the patient/caregiver able to teach back signs and symptoms related to disease process for when to call PCP? Yes   Is the patient/caregiver able to teach back signs and symptoms related to disease process for when to call 911? Yes   Is the patient/caregiver able to teach back the hierarchy of who to call/visit for symptoms/problems? PCP, Specialist, Home health nurse, Urgent Care, ED, 911 Yes   If the patient is a current smoker, are they able to teach back resources for cessation? Not a smoker   TCM call completed? Yes   Wrap up additional comments D/C DX: AMS,  Disorientation and repetitive questioning - Pt taking it easy at home today, tired but feels back to baseline. No questions. TCM APPT with PCP CASSIE Amos  is 01/17/2025.   Call end time 1802            Imani Givens MA    1/14/2025, 15:29 EST

## 2025-01-17 ENCOUNTER — OFFICE VISIT (OUTPATIENT)
Dept: INTERNAL MEDICINE | Facility: CLINIC | Age: 60
End: 2025-01-17
Payer: COMMERCIAL

## 2025-01-17 ENCOUNTER — TELEPHONE (OUTPATIENT)
Dept: CARDIOLOGY | Facility: CLINIC | Age: 60
End: 2025-01-17

## 2025-01-17 VITALS
HEART RATE: 66 BPM | DIASTOLIC BLOOD PRESSURE: 85 MMHG | TEMPERATURE: 97.2 F | HEIGHT: 66 IN | BODY MASS INDEX: 30.04 KG/M2 | SYSTOLIC BLOOD PRESSURE: 122 MMHG | WEIGHT: 186.9 LBS | OXYGEN SATURATION: 96 %

## 2025-01-17 DIAGNOSIS — R00.1 BRADYCARDIA, SINUS: Primary | ICD-10-CM

## 2025-01-17 DIAGNOSIS — R00.1 BRADYCARDIA, SINUS: ICD-10-CM

## 2025-01-17 DIAGNOSIS — G45.4 TGA (TRANSIENT GLOBAL AMNESIA): Primary | ICD-10-CM

## 2025-01-17 DIAGNOSIS — G45.9 TIA (TRANSIENT ISCHEMIC ATTACK): ICD-10-CM

## 2025-01-17 PROCEDURE — 99495 TRANSJ CARE MGMT MOD F2F 14D: CPT

## 2025-01-17 RX ORDER — MIDODRINE HYDROCHLORIDE 2.5 MG/1
2.5 TABLET ORAL DAILY
Qty: 30 TABLET | Refills: 2 | Status: SHIPPED | OUTPATIENT
Start: 2025-01-17

## 2025-01-17 NOTE — PROGRESS NOTES
Transitional Care Follow Up Visit  Subjective     Imani Mazariegos is a 59 y.o. female who presents for a transitional care management visit.    Within 48 business hours after discharge our office contacted her via telephone to coordinate her care and needs.      I reviewed and discussed the details of that call along with the discharge summary, hospital problems, inpatient lab results, inpatient diagnostic studies, and consultation reports with Imani.     Current outpatient and discharge medications have been reconciled for the patient.  Reviewed by: CASSIE Bird          1/13/2025     5:28 PM   Date of TCM Phone Call   [facility]   Date of Admission 1/12/2025   Date of Discharge 1/13/2025   Discharge Disposition Home or Self Care     Risk for Readmission (LACE) Score: 1 (1/13/2025  6:00 AM)      History of Present Illness  59-year-old female presenting for hospital follow-up.  Patient diagnosed with transient global amnesia.  States that she had an episode where she has no memory of what was going on but family members observed her during that time.  Sequence of events included being at home and taking a nap.  Upon waking up from her nap, got up quickly and drove 10 minutes away to her [place] to see her .  Went inside of her [place] and then cannot remember anything beyond that until admission to ER.   states that she was able to talk, walk and sit unsteadily during the episode.  She was repetitive in her questions and was unable to complete simple physical task (putting key in ignition of car).     States that her mother had similar episodes in which she would pass out.  Mother was diagnosed with sick sinus syndrome.    Patient's heart rate ranges between .  Patient does not wear Apple watch while sleeping.  Heart rate may have been below 42 at that time which decreased perfusion into her brain.  Will start midodrine low-dose daily to help increase blood pressure.    "  Course During Hospital Stay:  59-year-old female was seen and examined at bedside following admission to the observation unit secondary to transient global amnesia. Laboratory evaluation relatively unremarkable. CTA head and neck negative acute. Patient currently alert and oriented x 4 and denies unilateral weakness, numbness, tingling, dysarthria/aphasia or lightheadedness. MRI brain with and without shows no evidence of acute intracranial findings. Neurology evaluated patient has started her on daily baby aspirin, Repatha (and magnesium oxide. Also an EEG was ordered and shows no evidence of epileptogenic activity/seizure activity or focal slowing. Patient will be discharged home and to follow-up with neurology in 2 weeks.      The following portions of the patient's history were reviewed and updated as appropriate: allergies, current medications, past family history, past medical history, past social history, past surgical history, and problem list.    Review of Systems   All other systems reviewed and are negative.      Objective   /85 (BP Location: Left arm, Patient Position: Sitting, Cuff Size: Adult)   Pulse 66   Temp 97.2 °F (36.2 °C) (Temporal)   Ht 167.6 cm (66\")   Wt 84.8 kg (186 lb 14.4 oz)   SpO2 96%   BMI 30.17 kg/m²   Physical Exam  Vitals reviewed.   Constitutional:       Appearance: Normal appearance.   HENT:      Head: Normocephalic.   Musculoskeletal:         General: Normal range of motion.      Cervical back: Normal range of motion.   Skin:     General: Skin is warm and dry.      Capillary Refill: Capillary refill takes less than 2 seconds.   Neurological:      General: No focal deficit present.      Mental Status: She is alert and oriented to person, place, and time.   Psychiatric:         Mood and Affect: Mood normal.         Behavior: Behavior normal.         Thought Content: Thought content normal.         Judgment: Judgment normal.         Assessment & Plan   Problems " Addressed this Visit       Bradycardia, sinus    Relevant Medications    midodrine (PROAMATINE) 2.5 MG tablet     Other Visit Diagnoses       TGA (transient global amnesia)    -  Primary    Relevant Medications    midodrine (PROAMATINE) 2.5 MG tablet          Diagnoses         Codes Comments    TGA (transient global amnesia)    -  Primary ICD-10-CM: G45.4  ICD-9-CM: 437.7     Bradycardia, sinus     ICD-10-CM: R00.1  ICD-9-CM: 427.89             Patient Instructions   Take midodrine 2.5 mg daily. Monitor blood pressure daily. Stay hydrated with water. Contact Dr. Ellison for follow-up. Follow-up in 4 weeks for recheck.

## 2025-01-17 NOTE — TELEPHONE ENCOUNTER
Caller: Imani Mazariegos    Relationship to patient: Self    Best call back number: [phone]    Chief complaint: MINI STROKE    Type of visit: HOSPITAL FU    Requested date: ASAP     If rescheduling, when is the original appointment: NA     Additional notes:PATIENT WAS DISCHARGES FROM HOSPITAL ON 01.13.25 FOR POSSIBLE MINI AND NEEDS TO SCHEDULE A HOSPITAL FU ASAP. NO NOTES IN DISCHARGE PAPERWORK FOR FU TIME FRAME PLEASE ADVISE.

## 2025-01-17 NOTE — TELEPHONE ENCOUNTER
Authorization request sent to pre-cert team for Zio, will reach out to patient to schedule when it is approved.

## 2025-01-17 NOTE — PATIENT INSTRUCTIONS
Take midodrine 2.5 mg daily. Monitor blood pressure daily. Stay hydrated with water. Contact Dr. Ellison for follow-up. Follow-up in 4 weeks for recheck.

## 2025-01-17 NOTE — TELEPHONE ENCOUNTER
We can see her in 2-4 weeks. She is on correct treatment for now with ASA 81 mg daily and started on cholesterol control. If she has any palpitations or fast heart rates in the mean time I would want to see her sooner.     After a possible TIA, we will typically get a zio patch to look for arrhythmia. I will order this and we can try to get this completed prior to her follow up.

## 2025-01-20 ENCOUNTER — PATIENT MESSAGE (OUTPATIENT)
Dept: INTERNAL MEDICINE | Facility: CLINIC | Age: 60
End: 2025-01-20
Payer: COMMERCIAL

## 2025-01-30 ENCOUNTER — CLINICAL SUPPORT (OUTPATIENT)
Dept: CARDIOLOGY | Facility: CLINIC | Age: 60
End: 2025-01-30
Payer: COMMERCIAL

## 2025-01-30 DIAGNOSIS — R00.2 PALPITATIONS: Primary | ICD-10-CM

## 2025-01-30 NOTE — PROGRESS NOTES
Procedure     ECG 12 Lead    Date/Time: 1/30/2025 2:40 PM  Performed by: Celso Fonseca APRN    Authorized by: Guanaco Avila PA-C  Comparison: compared with previous ECG   Similar to previous ECG  Rhythm: sinus rhythm  Comments: He called the office with palpitations that woke her up at 2 AM this morning.  She said from 2 AM to 5 AM she noted intermittent palpitations.  Currently she feels well, her EKG shows normal sinus rhythm.  She had a TIA few weeks ago and is currently wearing a 2-week ZIO.  We had a long discussion about what she is feeling.  I recommended that we wait till the monitor comes back to make a diagnosis and will give further recommendations.

## 2025-01-30 NOTE — TELEPHONE ENCOUNTER
"Patient states she is currently wearing a heart monitor. She was woken up at 2:30 AM with \"weird chest palpitations.\" She said they are still going on and she has pain on the right side of her back and she is belching. She is currently at work, but earlier today her BP was 146/97 HR 67.     Inge Sr RN  Triage [facility]   "

## 2025-01-30 NOTE — TELEPHONE ENCOUNTER
Patient left voicemail stating that she was awakened this morning with the feeling of her heart in her throat. Unsure what is going on.    Please call to triage further.

## 2025-01-30 NOTE — TELEPHONE ENCOUNTER
If they are constant then can we try to get an EKG to see what rhythm this is? In the meantime Please have her keep the monitor on since we are looking long term for possible AF in setting of recent TIA

## 2025-02-01 DIAGNOSIS — K21.9 GASTROESOPHAGEAL REFLUX DISEASE, UNSPECIFIED WHETHER ESOPHAGITIS PRESENT: Chronic | ICD-10-CM

## 2025-02-03 RX ORDER — PANTOPRAZOLE SODIUM 40 MG/1
40 TABLET, DELAYED RELEASE ORAL DAILY
Qty: 90 TABLET | Refills: 1 | Status: SHIPPED | OUTPATIENT
Start: 2025-02-03

## 2025-02-21 ENCOUNTER — OFFICE VISIT (OUTPATIENT)
Dept: INTERNAL MEDICINE | Facility: CLINIC | Age: 60
End: 2025-02-21
Payer: COMMERCIAL

## 2025-02-21 VITALS
SYSTOLIC BLOOD PRESSURE: 123 MMHG | DIASTOLIC BLOOD PRESSURE: 84 MMHG | TEMPERATURE: 98.2 F | HEART RATE: 64 BPM | OXYGEN SATURATION: 98 % | BODY MASS INDEX: 31.53 KG/M2 | WEIGHT: 196.2 LBS | HEIGHT: 66 IN

## 2025-02-21 DIAGNOSIS — I49.8 OTHER SPECIFIED CARDIAC ARRHYTHMIAS: Primary | ICD-10-CM

## 2025-02-21 PROCEDURE — 99213 OFFICE O/P EST LOW 20 MIN: CPT

## 2025-02-21 NOTE — PROGRESS NOTES
"Chief Complaint  Follow-up, Shortness of Breath (Last few weeks ), and Foot Swelling (Right ankle)    Subjective        Imani Mazariegos presents to [facility]  History of Present Illness  59-year-old female presenting for blood pressure follow-up.  Has stopped midodrine.  Tolerated well but is still having symptoms from arrhythmia.  Holter monitor showed nonsustained SVT.  Has started having other symptoms including swelling of her right ankle as well as weight gain.  Exercises regularly.  Occasionally has symptoms present on treadmill but can occur throughout the day.  Symptoms even wake her up at night.  Multiple family members have similar symptoms that are cardiac in nature.        Objective   Vital Signs:  /84 (BP Location: Left arm, Patient Position: Sitting, Cuff Size: Adult)   Pulse 64   Temp 98.2 °F (36.8 °C) (Temporal)   Ht 167.6 cm (66\")   Wt 89 kg (196 lb 3.2 oz)   SpO2 98%   BMI 31.67 kg/m²   Estimated body mass index is 31.67 kg/m² as calculated from the following:    Height as of this encounter: 167.6 cm (66\").    Weight as of this encounter: 89 kg (196 lb 3.2 oz).       BMI is >= 30 and <35. (Class 1 Obesity). The following options were offered after discussion;: exercise counseling/recommendations and nutrition counseling/recommendations      Physical Exam  Vitals reviewed.   Constitutional:       Appearance: Normal appearance.   HENT:      Head: Normocephalic.   Musculoskeletal:         General: Swelling present. Normal range of motion.      Cervical back: Normal range of motion.   Skin:     General: Skin is warm and dry.      Capillary Refill: Capillary refill takes less than 2 seconds.   Neurological:      General: No focal deficit present.      Mental Status: She is alert and oriented to person, place, and time.   Psychiatric:         Mood and Affect: Mood normal.         Behavior: Behavior normal.         Thought Content: Thought content normal.         " Judgment: Judgment normal.        Result Review :      Common labs          3/1/2024    09:53 9/12/2024    08:59 1/12/2025    20:19   Common Labs   Glucose 103  96  95    BUN 20  24  20    Creatinine 0.84  0.79  0.81    Sodium 143  142  139    Potassium 5.2  4.8  4.1    Chloride 106  108  107    Calcium 9.5  9.2  8.9    Albumin 4.4  4.4  4.2    Total Bilirubin 0.4  0.5  0.2    Alkaline Phosphatase 100  93  98    AST (SGOT) 26  23  27    ALT (SGPT) 28  25  30    WBC 4.67   6.13    Hemoglobin 15.1   14.5    Hematocrit 44.8   43.1    Platelets 288   269    Total Cholesterol 193  215     Triglycerides 91  92     HDL Cholesterol 65  62     LDL Cholesterol  112  137           Current Outpatient Medications on File Prior to Visit   Medication Sig Dispense Refill    pantoprazole (PROTONIX) 40 MG EC tablet TAKE 1 TABLET BY MOUTH EVERY DAY 90 tablet 1    VITAMIN D PO Take  by mouth Daily.      [DISCONTINUED] Ashwagandha 300 MG tablet       [DISCONTINUED] aspirin 81 MG EC tablet Take 1 tablet by mouth Daily. 30 tablet 2    [DISCONTINUED] magnesium oxide (MAG-OX) 400 MG tablet Take 1 tablet by mouth Daily. 60 tablet 1    [DISCONTINUED] midodrine (PROAMATINE) 2.5 MG tablet Take 1 tablet by mouth Daily. 30 tablet 2    [DISCONTINUED] Omega-3 Fatty Acids (fish oil) 1000 MG capsule capsule        No current facility-administered medications on file prior to visit.                Assessment and Plan     Diagnoses and all orders for this visit:    1. Other specified cardiac arrhythmias (Primary)        Patient Instructions   Continue medications as prescribed. Stay active. Follow-up with Dr. Ellison. Follow-up as scheduled for fasting labs.            Follow Up     Return for Next scheduled follow up.  Patient was given instructions and counseling regarding her condition or for health maintenance advice. Please see specific information pulled into the AVS if appropriate.

## 2025-02-21 NOTE — PATIENT INSTRUCTIONS
Continue medications as prescribed. Stay active. Follow-up with Dr. Ellison. Follow-up as scheduled for fasting labs.

## 2025-02-24 ENCOUNTER — OFFICE VISIT (OUTPATIENT)
Age: 60
End: 2025-02-24
Payer: COMMERCIAL

## 2025-02-24 VITALS
DIASTOLIC BLOOD PRESSURE: 84 MMHG | HEART RATE: 60 BPM | HEIGHT: 66 IN | SYSTOLIC BLOOD PRESSURE: 126 MMHG | BODY MASS INDEX: 31.5 KG/M2 | WEIGHT: 196 LBS

## 2025-02-24 DIAGNOSIS — I49.3 PVC'S (PREMATURE VENTRICULAR CONTRACTIONS): ICD-10-CM

## 2025-02-24 DIAGNOSIS — R00.1 BRADYCARDIA, SINUS: ICD-10-CM

## 2025-02-24 DIAGNOSIS — R61 NIGHT SWEATS: Primary | ICD-10-CM

## 2025-02-24 DIAGNOSIS — R00.2 PALPITATIONS: Primary | ICD-10-CM

## 2025-02-24 PROCEDURE — 99214 OFFICE O/P EST MOD 30 MIN: CPT | Performed by: PHYSICIAN ASSISTANT

## 2025-02-24 PROCEDURE — 93000 ELECTROCARDIOGRAM COMPLETE: CPT | Performed by: PHYSICIAN ASSISTANT

## 2025-02-24 RX ORDER — FLECAINIDE ACETATE 50 MG/1
50 TABLET ORAL 2 TIMES DAILY
Qty: 60 TABLET | Refills: 1 | Status: SHIPPED | OUTPATIENT
Start: 2025-02-24

## 2025-02-24 NOTE — PROGRESS NOTES
ELECTROPHYSIOLOGY   Date of Office Visit: 2025  Patient Name: Imani Mazariegos  : 1965  Encounter Provider: Guanaco Wilkins PA-C  Electrophysiologist: Dr. Ellison  CHIEF COMPLAINT / REASON FOR OFFICE VISIT     Slow Heart Rate  Zio follow up     HISTORY OF PRESENT ILLNESS     This is a 59 y.o. year old female who presents to [facility] CARDIOLOGY for a  follow-up after recent heart monitor.    She was initially referred to our office for a slow heart rate and had a Zio that showed palpitations correlated to sinus rhythm.    She was admitted to the hospital on 2025 after concerns for possible TIA.  She had a transient global amnesia and had a subsequent negative CTA of her head.  She was started on aspirin    At the end of January, she called into the office with palpitations that woke her up at 2 AM.  She had an EKG that showed normal sinus rhythm and a TIA a few weeks ago and was recommended to continue to wear the monitor for further evaluation.    Today the patient reports she started having these palpitations or symptomatic PVCs around 25 years ago.  She at that time was having episodes of dizziness and lightheadedness and had also had a tilt table done that was positive for orthostatic hypotension.  They gave her midodrine at that point.    In the last 3 weeks she has had more palpitations and when they organized into a one-to-one bigeminal pattern she feels kind of dizzy and like she is having an aura.  She feels as though this is likely what happened after her transient global amnesia attack about a month ago.    She is having diaphoresis at night.  She will wake up in a sweat tunnel and have no recollection.  This is new as well in the last few weeks.    She has not had any worsening chest pain or pressure.  She is still walking on a treadmill over 2 miles a day and feeling good.  The exception to this would be when she is feeling palpitations she will have some  "breathlessness.    Her father had an ICD and her mother had a pacemaker.  She has a strong arrhythmia history in her family and this makes her quite worried.    PMHx: Sinus bradycardia, orthostatic hypotension,  symptomatic PVCs, hyperlipidemia, recent TIA, GERD, previous seizure    PHYSICAL EXAMINATION     Vital Signs:  /84 (BP Location: Left arm, Patient Position: Sitting)   Pulse 60   Ht 167.6 cm (66\")   Wt 88.9 kg (196 lb)   BMI 31.64 kg/m²   Estimated body mass index is 31.64 kg/m² as calculated from the following:    Height as of this encounter: 167.6 cm (66\").    Weight as of this encounter: 88.9 kg (196 lb).               Physical Exam  Constitutional:       Appearance: Normal appearance.   HENT:      Head: Normocephalic and atraumatic.   Cardiovascular:      Rate and Rhythm: Normal rate and regular rhythm.      Pulses: Normal pulses.      Heart sounds: Normal heart sounds.   Pulmonary:      Effort: Pulmonary effort is normal.      Breath sounds: Normal breath sounds.   Musculoskeletal:      Right lower leg: No edema.      Left lower leg: No edema.   Skin:     General: Skin is warm and dry.   Neurological:      General: No focal deficit present.      Mental Status: She is alert and oriented to person, place, and time.          Cardiac Testing/Results     Cardiac Testing:   - Zio 1/20/2025: Relatively benign monitor study with a 4 beat run of NSVT with a handful of nonspecific atrial tachycardia's lasting for a few seconds.  No A-fib.  PVC burden 1%.    -Echo 7/31/2023: EF is 63.9% with sigmoid shaped ventricular septum.  Normal diastolic function    -Exercise stress test 7/31/2023: Small sized mild severity ischemia located anterior wall.  EF over 70%.    Result Review :  The following data was reviewed by: Guanaco Wilkins PA-C on 02/24/2025:    Lipid Panel          9/12/2024    08:59   Lipid Panel   Total Cholesterol 215    Triglycerides 92    HDL Cholesterol 62    VLDL Cholesterol 16    LDL " "Cholesterol  137       Lab Results   Component Value Date     01/12/2025     09/12/2024    K 4.1 01/12/2025    K 4.8 09/12/2024     01/12/2025     (H) 09/12/2024    CO2 22.0 01/12/2025    CO2 25.8 09/12/2024    BUN 20 01/12/2025    BUN 24 (H) 09/12/2024    CREATININE 0.81 01/12/2025    CREATININE 0.79 09/12/2024    EGFRIFNONA 62 02/02/2022    EGFRIFNONA 81 10/18/2021    EGFRIFAFRI 72 02/02/2022    EGFRIFAFRI 81 02/07/2020    GLUCOSE 95 01/12/2025    GLUCOSE 96 09/12/2024    CALCIUM 8.9 01/12/2025    CALCIUM 9.2 09/12/2024    ALBUMIN 4.2 01/12/2025    ALBUMIN 4.4 09/12/2024    AST 27 01/12/2025    AST 23 09/12/2024    ALT 30 01/12/2025    ALT 25 09/12/2024     Lab Results   Component Value Date    WBC 6.13 01/12/2025    WBC 4.67 03/01/2024    HGB 14.5 01/12/2025    HGB 15.1 03/01/2024    HCT 43.1 01/12/2025    HCT 44.8 03/01/2024    MCV 93.1 01/12/2025    MCV 90.7 03/01/2024     01/12/2025     03/01/2024     No results found for: \"PROBNP\", \"BNP\"  Lab Results   Component Value Date    TROPONINT <6 01/12/2025     Lab Results   Component Value Date    TSH 2.690 03/01/2024    TSH 2.480 02/24/2023                 ECG 12 Lead    Date/Time: 2/24/2025 8:39 AM  Performed by: Guanaco Avila PA-C    Authorized by: Guanaco Avila PA-C  Comparison: compared with previous ECG from 1/30/2025  Rhythm: sinus rhythm  Rate: normal  BPM: 60  T flattening: V3 and V4  QRS axis: normal  Comments: Qtc 440                ASSESSMENT & PLAN       Diagnoses and all orders for this visit:    1-2. Palpitations (Primary), Bradycardia, sinus  Long standing history of palpitations that are acting up more in the past few weeks. She has extensive family hx of arrhythmia  She has had noted bradycardia in the past but recent zio shows normal average HR and EKG today shows HR in the 60s  3. PVC's (premature ventricular contractions)  She is having symptomatic PVCs, she has had these long term but they " are more prevalent now.   When wearing zio she had worsening symptoms of fatigue, fluttering ect when they organize into a bigeminal pattern and then she will feel bad the rest of the day. Overall burden is low but impairing day to day  Options reviewed; she has cut back on caffeine, staying hydrated, keeping up work out routine.   Labs recently were normal with electrolytes and TSH being normal  Prior echo and MPI with normal EF and no structural heart disease  EKG today in SR without PVCs  Plan to start flecanide 50 mg BID x 30 days to suppress arrhythmia, I will call her in 3 weeks to see how symptoms are. [place] is too low right now for an ablation   BB considered but she has an issue with orthostatic hypotension         Follow Up:  Return in about 3 months (around 5/24/2025) for Dr. Ellison- Routine.  Patient was given instructions and counseling regarding her condition or for health maintenance advice. Please contact office if worsening symptoms or proceed to ER when appropriate.      Guanaco Wilkins PA-C  02/24/25  09:02 EST    MEDICATIONS         Discharge Medications            Accurate as of February 24, 2025  9:02 AM. If you have any questions, ask your nurse or doctor.                New Medications        Instructions Start Date   flecainide 50 MG tablet  Commonly known as: TAMBOCOR  Started by: Guanaco Wilkins   50 mg, Oral, 2 Times Daily             Continue These Medications        Instructions Start Date   pantoprazole 40 MG EC tablet  Commonly known as: PROTONIX   40 mg, Oral, Daily                   **Dragon Disclaimer: This note was dictated using an electronic transcription. The electronic translation of spoken language may permit erroneous, or at times, nonsensical words or phrases to be inadvertently transcribed. Although I have reviewed the note for such errors, some may still exist.

## 2025-02-26 ENCOUNTER — APPOINTMENT (OUTPATIENT)
Dept: WOMENS IMAGING | Facility: HOSPITAL | Age: 60
End: 2025-02-26
Payer: COMMERCIAL

## 2025-02-26 PROCEDURE — 77063 BREAST TOMOSYNTHESIS BI: CPT | Performed by: RADIOLOGY

## 2025-02-26 PROCEDURE — 77067 SCR MAMMO BI INCL CAD: CPT | Performed by: RADIOLOGY

## 2025-03-18 ENCOUNTER — TELEPHONE (OUTPATIENT)
Age: 60
End: 2025-03-18
Payer: COMMERCIAL

## 2025-03-18 DIAGNOSIS — I49.3 PVC'S (PREMATURE VENTRICULAR CONTRACTIONS): ICD-10-CM

## 2025-03-18 RX ORDER — FLECAINIDE ACETATE 50 MG/1
50 TABLET ORAL 2 TIMES DAILY
Qty: 180 TABLET | Refills: 2 | Status: SHIPPED | OUTPATIENT
Start: 2025-03-18

## 2025-03-18 NOTE — TELEPHONE ENCOUNTER
Called pt; she is finally sleeping and not feeling any more palps and can sleep 7-8 hours     She feels so much better  on flecanide. No chest pain or shortness of breath.     Some swelling in left ankle - better after cutting back on salt.

## 2025-03-18 NOTE — TELEPHONE ENCOUNTER
----- Message from Guanaco Avila sent at 2/24/2025  8:53 AM EST -----  Regarding: follow up  See how flecainide has helped PVCs

## 2025-04-13 DIAGNOSIS — G45.4 TGA (TRANSIENT GLOBAL AMNESIA): ICD-10-CM

## 2025-04-14 RX ORDER — MIDODRINE HYDROCHLORIDE 2.5 MG/1
2.5 TABLET ORAL DAILY
Qty: 90 TABLET | OUTPATIENT
Start: 2025-04-14

## 2025-05-14 ENCOUNTER — TELEPHONE (OUTPATIENT)
Dept: INTERNAL MEDICINE | Facility: CLINIC | Age: 60
End: 2025-05-14
Payer: COMMERCIAL

## 2025-05-14 NOTE — TELEPHONE ENCOUNTER
Caller: Imani Mazariegos    Relationship to patient: Self    Best call back number: [phone]    Type of visit: LAB

## 2025-05-28 ENCOUNTER — OFFICE VISIT (OUTPATIENT)
Age: 60
End: 2025-05-28
Payer: COMMERCIAL

## 2025-05-28 VITALS
HEIGHT: 66 IN | SYSTOLIC BLOOD PRESSURE: 142 MMHG | HEART RATE: 53 BPM | WEIGHT: 197 LBS | DIASTOLIC BLOOD PRESSURE: 86 MMHG | BODY MASS INDEX: 31.66 KG/M2

## 2025-05-28 DIAGNOSIS — R00.2 PALPITATIONS: ICD-10-CM

## 2025-05-28 DIAGNOSIS — R00.1 BRADYCARDIA, SINUS: Primary | ICD-10-CM

## 2025-05-28 PROCEDURE — 99214 OFFICE O/P EST MOD 30 MIN: CPT | Performed by: INTERNAL MEDICINE

## 2025-05-28 PROCEDURE — 93000 ELECTROCARDIOGRAM COMPLETE: CPT | Performed by: INTERNAL MEDICINE

## 2025-05-28 NOTE — PROGRESS NOTES
Date of Office Visit: 2025  Encounter Provider: Nav Ellison MD  Place of Service: [facility] CARDIOLOGY  Patient Name: Imani Mazariegos  : 1965    Subjective:     Encounter Date:2025      Patient ID: Imani Mazariegos is a 60 y.o. female who has a cc of  SB and PVCs and Guanaco started flecainide.     It worked to suppress PVCs but had tremor --stopped flec and tremor stopped.     Had global amnesia and normal MRI brain     Main problem now is chest pain. Non-exertional. Lasting 25 seconds.     Walks on treadmill for 2 miles per day.,     She emphasizes her strong family history/.     There have been no hospital admission since the last visit.     There have been no bleeding events.       Past Medical History:   Diagnosis Date    Abdominal pain, LLQ     Abdominal pain, LUQ     Anxiety     Bradycardia     HR OFTEN STAYS IN 30S WITH ANESTHESIA    Cholecystitis     Colon polyps 2021    Ascending colon: tubular adenoma, sigmoid colon: tubular adenoma    Diverticulitis of colon 2013    Elevated cholesterol     Epilepsy without status epilepticus, not intractable     GERD (gastroesophageal reflux disease)     History of methicillin resistant staphylococcus aureus (MRSA)     IN AMNIOTIC FLUID    IBS (irritable bowel syndrome)     Kidney stones     Sciatica     Seizures     AS A CHILD- NO LONGER ON MEDICATIONS AT THIS TIME. LAST SEIZURE APPROX. 2020    Stroke     RIGHT SIDE WEAKNESS, UNKNOWN CAUSE    Symptomatic states associated with artificial menopause     Urinary retention     Visual impairment     WEAR GLASSES       Social History     Socioeconomic History    Marital status:     Number of children: 1    Years of education: BSN   Tobacco Use    Smoking status: Never     Passive exposure: Never    Smokeless tobacco: Never    Tobacco comments:     caffeine use   Vaping Use    Vaping status: Never Used   Substance and Sexual Activity    Alcohol use: Never    Drug use:  "Never    Sexual activity: Yes     Partners: Male     Birth control/protection: Post-menopausal       Family History   Problem Relation Age of Onset    Sick sinus syndrome Mother     Heart failure Mother     Pernicious anemia Mother     Hepatitis Mother         Hep C    Other Mother         Peripheral neuropathy    Colon polyps Mother     Diverticulitis Mother     Early death Mother         natural causes    Heart disease Mother         cad    Hyperlipidemia Mother     Hypertension Mother     Sick sinus syndrome Father         pacemaker    Arthritis Father     Cancer Father         prostate    Heart disease Father         Parkinsons/prostate cancer/Aicd    Hyperlipidemia Father     Hypertension Father     Parkinsonism Father     No Known Problems Brother     Diabetes Maternal Grandmother     Stomach cancer Maternal Grandfather     Diabetes Paternal Grandmother     Colon cancer Neg Hx     Malig Hyperthermia Neg Hx        Review of Systems   Constitutional: Negative for fever and night sweats.   HENT:  Negative for ear pain and stridor.    Eyes:  Negative for discharge and visual halos.   Cardiovascular:  Negative for cyanosis.   Respiratory:  Negative for hemoptysis and sputum production.    Hematologic/Lymphatic: Negative for adenopathy.   Skin:  Negative for nail changes and unusual hair distribution.   Musculoskeletal:  Negative for gout and joint swelling.   Gastrointestinal:  Negative for bowel incontinence and flatus.   Genitourinary:  Negative for dysuria and flank pain.   Neurological:  Negative for seizures and tremors.   Psychiatric/Behavioral:  Negative for altered mental status. The patient is not nervous/anxious.             Objective:     Vitals:    05/28/25 0839   BP: 142/86   BP Location: Right arm   Patient Position: Sitting   Pulse: 53   Weight: 89.4 kg (197 lb)   Height: 167.6 cm (66\")         Eyes:      General:         Right eye: No discharge.         Left eye: No discharge.   HENT:      Head: " "Normocephalic and atraumatic.   Neck:      Thyroid: No thyromegaly.      Vascular: No JVD.   Pulmonary:      Effort: Pulmonary effort is normal.      Breath sounds: Normal breath sounds. No rales.   Cardiovascular:      Normal rate. Regular rhythm.      No gallop.    Edema:     Peripheral edema absent.   Abdominal:      General: Bowel sounds are normal.      Palpations: Abdomen is soft.      Tenderness: There is no abdominal tenderness.   Musculoskeletal: Normal range of motion.         General: No deformity. Skin:     General: Skin is warm and dry.      Findings: No erythema.   Neurological:      Mental Status: Alert and oriented to person, place, and time.      Motor: Normal muscle tone.   Psychiatric:         Behavior: Behavior normal.         Thought Content: Thought content normal.           ECG 12 Lead    Date/Time: 5/28/2025 9:40 AM  Performed by: Nav Ellison MD    Authorized by: Nav Ellison MD  Comparison: compared with previous ECG   Similar to previous ECG  Rhythm: sinus rhythm  Rate: normal  Conduction: conduction normal  ST Segments: ST segments normal  T Waves: T waves normal  QRS axis: normal    Clinical impression: normal ECG          Lab Review:       Assessment:          Diagnosis Plan   1. Bradycardia, sinus        2. Palpitations               Plan:     She has many complaints involving many diff systems. CNS, Cardiac, Pulm,     [facility].     She is very concerned with family history.    \"Something is wrong\"       "

## 2025-06-30 DIAGNOSIS — G45.4 TGA (TRANSIENT GLOBAL AMNESIA): ICD-10-CM

## 2025-07-01 RX ORDER — MIDODRINE HYDROCHLORIDE 2.5 MG/1
2.5 TABLET ORAL DAILY
Qty: 90 TABLET | OUTPATIENT
Start: 2025-07-01

## 2025-08-09 DIAGNOSIS — K21.9 GASTROESOPHAGEAL REFLUX DISEASE, UNSPECIFIED WHETHER ESOPHAGITIS PRESENT: Chronic | ICD-10-CM

## 2025-08-12 RX ORDER — PANTOPRAZOLE SODIUM 40 MG/1
40 TABLET, DELAYED RELEASE ORAL DAILY
Qty: 90 TABLET | Refills: 1 | Status: SHIPPED | OUTPATIENT
Start: 2025-08-12

## (undated) DEVICE — GOWN ISOL W/THUMB UNIV BLU BX/15

## (undated) DEVICE — KT ORCA ORCAPOD DISP STRL

## (undated) DEVICE — GOWN ,SIRUS,NONREINFORCED 3XL: Brand: MEDLINE

## (undated) DEVICE — SUT VIC 0 TN 27IN DYED JTN0G

## (undated) DEVICE — LAPAROVUE VISIBILITY SYSTEM LAPAROSCOPIC SOLUTIONS: Brand: LAPAROVUE

## (undated) DEVICE — GLV SURG BIOGEL M LTX PF 6 1/2

## (undated) DEVICE — JACKT LAB F/R KNIT CUFF/COLR XLG BLU

## (undated) DEVICE — SUT VIC 5/0 PS2 18IN J495H

## (undated) DEVICE — CATH CHOLANG 4.5F18IN BRGNDY

## (undated) DEVICE — APPL CHLORAPREP HI/LITE 26ML ORNG

## (undated) DEVICE — ENDOPATH PNEUMONEEDLE INSUFFLATION NEEDLES WITH LUER LOCK CONNECTORS 120MM: Brand: ENDOPATH

## (undated) DEVICE — SUT VIC 3/0 CT2 27IN J232H

## (undated) DEVICE — ENDOPATH XCEL BLADELESS TROCARS WITH STABILITY SLEEVES: Brand: ENDOPATH XCEL

## (undated) DEVICE — SINGLE-USE BIOPSY FORCEPS: Brand: RADIAL JAW 4

## (undated) DEVICE — SYRINGE, LUER SLIP, STERILE, 60ML: Brand: MEDLINE

## (undated) DEVICE — Device

## (undated) DEVICE — ENDOPOUCH RETRIEVER SPECIMEN RETRIEVAL BAGS: Brand: ENDOPOUCH RETRIEVER

## (undated) DEVICE — CONTAINER,SPECIMEN,OR STERILE,4OZ: Brand: MEDLINE

## (undated) DEVICE — STPCK 3WY D201 DISCOFIX

## (undated) DEVICE — SINGLE-USE POLYPECTOMY SNARE: Brand: SENSATION SHORT THROW

## (undated) DEVICE — ENDOCUT SCISSOR TIP, DISPOSABLE: Brand: RENEW

## (undated) DEVICE — FLEX ADVANTAGE 1500CC: Brand: FLEX ADVANTAGE

## (undated) DEVICE — EXTENSION SET, MALE LUER LOCK ADAPTER WITH RETRACTABLE COLLAR

## (undated) DEVICE — LAPAROSCOPIC SMOKE FILTRATION SYSTEM: Brand: PALL LAPAROSHIELD® PLUS LAPAROSCOPIC SMOKE FILTRATION SYSTEM

## (undated) DEVICE — SOL NACL 0.9PCT 1000ML

## (undated) DEVICE — CATH IV INSYTE AUTOGARD 14G 1 1/2IN ORNG

## (undated) DEVICE — BITEBLOCK OMNI BLOC

## (undated) DEVICE — DRAPE,REIN 53X77,STERILE: Brand: MEDLINE

## (undated) DEVICE — DISPOSABLE MONOPOLAR ENDOSCOPIC CORD 10 FT. (3M): Brand: KIRWAN

## (undated) DEVICE — VIAL FORMLN CAP 10PCT 20ML

## (undated) DEVICE — MSK ENDO PORT O2 POM ELITE CURAPLEX A/

## (undated) DEVICE — NDL HYPO ECLPS SFTY 22G 1 1/2IN

## (undated) DEVICE — LOU LAP CHOLE: Brand: MEDLINE INDUSTRIES, INC.